# Patient Record
Sex: MALE | Race: BLACK OR AFRICAN AMERICAN | Employment: UNEMPLOYED | URBAN - METROPOLITAN AREA
[De-identification: names, ages, dates, MRNs, and addresses within clinical notes are randomized per-mention and may not be internally consistent; named-entity substitution may affect disease eponyms.]

---

## 2017-03-20 RX ORDER — LORATADINE 10 MG/1
TABLET ORAL
Qty: 31 TAB | Status: SHIPPED | OUTPATIENT
Start: 2017-03-20 | End: 2017-11-16 | Stop reason: ALTCHOICE

## 2017-04-25 RX ORDER — POLYETHYLENE GLYCOL 3350 17 G/17G
POWDER, FOR SOLUTION ORAL
Qty: 527 G | Refills: 0 | Status: SHIPPED | OUTPATIENT
Start: 2017-04-25 | End: 2017-05-31 | Stop reason: SDUPTHER

## 2017-05-31 ENCOUNTER — OFFICE VISIT (OUTPATIENT)
Dept: FAMILY MEDICINE CLINIC | Age: 42
End: 2017-05-31

## 2017-05-31 VITALS
HEART RATE: 82 BPM | RESPIRATION RATE: 16 BRPM | HEIGHT: 64 IN | WEIGHT: 123.6 LBS | DIASTOLIC BLOOD PRESSURE: 84 MMHG | TEMPERATURE: 96.5 F | SYSTOLIC BLOOD PRESSURE: 124 MMHG | BODY MASS INDEX: 21.1 KG/M2

## 2017-05-31 DIAGNOSIS — J30.2 SEASONAL ALLERGIC RHINITIS, UNSPECIFIED ALLERGIC RHINITIS TRIGGER: Primary | ICD-10-CM

## 2017-05-31 RX ORDER — FLUTICASONE PROPIONATE 50 MCG
SPRAY, SUSPENSION (ML) NASAL
Qty: 1 BOTTLE | Refills: 12 | Status: SHIPPED | OUTPATIENT
Start: 2017-05-31 | End: 2017-11-16 | Stop reason: ALTCHOICE

## 2017-05-31 RX ORDER — CETIRIZINE HCL 10 MG
10 TABLET ORAL DAILY
Qty: 30 TAB | Refills: 12 | Status: SHIPPED | OUTPATIENT
Start: 2017-05-31 | End: 2017-11-16 | Stop reason: SDUPTHER

## 2017-05-31 RX ORDER — POLYETHYLENE GLYCOL 3350 17 G/17G
POWDER, FOR SOLUTION ORAL
Qty: 527 G | Refills: 0 | Status: SHIPPED | OUTPATIENT
Start: 2017-05-31 | End: 2017-06-26 | Stop reason: SDUPTHER

## 2017-05-31 NOTE — PROGRESS NOTES
HISTORY OF PRESENT ILLNESS  Sharon Fan is a 38 y.o. male. HPI Brought in by Magdiel and liliana. Has been having lots of problems with runny nose, congestion for the last 2 weeks. NO cough, fever, chills. Not taking any meds for this condition. Often has problems with runny nose. Has been on Flonase in the past- minimal relief. ROS    Physical Exam   Constitutional: He appears well-developed and well-nourished. HENT:   Right Ear: External ear normal.   Left Ear: External ear normal.   Mouth/Throat: Oropharynx is clear and moist.   Clear rhinorrhea   Neck: No thyromegaly present. Cardiovascular: Normal rate, regular rhythm, normal heart sounds and intact distal pulses. Pulmonary/Chest: Effort normal and breath sounds normal. No respiratory distress. He has no wheezes. Abdominal: Soft. Bowel sounds are normal. He exhibits no distension and no mass. There is no tenderness. There is no guarding. Musculoskeletal: Normal range of motion. He exhibits no edema. Lymphadenopathy:     He has no cervical adenopathy. Nursing note and vitals reviewed. ASSESSMENT and PLAN  Orders Placed This Encounter    cetirizine (ZYRTEC) 10 mg tablet    fluticasone (FLONASE) 50 mcg/actuation nasal spray     Ashleigh Elizondo was seen today for nasal congestion. Diagnoses and all orders for this visit:    Seasonal allergic rhinitis, unspecified allergic rhinitis trigger    Other orders  -     Cancel: CBC WITH AUTOMATED DIFF  -     Cancel: METABOLIC PANEL, COMPREHENSIVE  -     Cancel: LIPID PANEL  -     Cancel: AMB POC HEMOGLOBIN A1C  -     cetirizine (ZYRTEC) 10 mg tablet; Take 1 Tab by mouth daily.  For allergies  -     fluticasone (FLONASE) 50 mcg/actuation nasal spray; 2 sprays each nostril daily      Follow-up Disposition: Not on File

## 2017-05-31 NOTE — MR AVS SNAPSHOT
Visit Information Date & Time Provider Department Dept. Phone Encounter #  
 5/31/2017  2:30 PM Maria Isabel Navas MD [facility] [phone] [id number] Upcoming Health Maintenance Date Due  
 MEDICARE YEARLY EXAM 10/3/2015 EYE EXAM RETINAL OR DILATED Q1 7/9/2016 LIPID PANEL Q1 11/10/2016 MICROALBUMIN Q1 11/12/2016 FOOT EXAM Q1 3/23/2017 HEMOGLOBIN A1C Q6M 6/1/2017 INFLUENZA AGE 9 TO ADULT 8/1/2017 DTaP/Tdap/Td series (2 - Td) 8/12/2023 Allergies as of 5/31/2017  Review Complete On: 5/31/2017 By: Maria Isabel Navas MD  
  
 Severity Noted Reaction Type Reactions Zofran [Ondansetron Hcl] High 11/11/2011    Itching Arm turned red & itched Current Immunizations  Reviewed on 11/5/2015 Name Date Influenza Vaccine 10/22/2014 Influenza Vaccine (Quad) PF 12/1/2016, 11/5/2015 Influenza Vaccine PF 11/7/2013 Influenza Vaccine Split 9/26/2012, 12/14/2011, 10/26/2009 PPD 9/26/2012 Pneumococcal Polysaccharide (PPSV-23) 8/12/2013 TB Skin Test (PPD) Intradermal 11/10/2015, 10/2/2014, 9/16/2013, 8/12/2013 Tdap 8/12/2013 Not reviewed this visit Vitals BP Pulse Temp Resp Height(growth percentile) Weight(growth percentile) 124/84 82 96.5 °F (35.8 °C) (Oral) 16 5' 4\" (1.626 m) 123 lb 9.6 oz (56.1 kg) BMI Smoking Status 21.22 kg/m2 Never Smoker Vitals History BMI and BSA Data Body Mass Index Body Surface Area  
 21.22 kg/m 2 1.59 m 2 Preferred Pharmacy Pharmacy Name Phone [facility] [address] [phone] Your Updated Medication List  
  
   
This list is accurate as of: 5/31/17  3:37 PM.  Always use your most recent med list.  
  
  
  
  
 acetaminophen 100 mg/mL suspension Commonly known as:  TYLENOL Take 9.9 mL by mouth every four (4) hours as needed for Pain or Fever. albuterol 90 mcg/actuation inhaler Commonly known as:  PROVENTIL HFA, VENTOLIN HFA, PROAIR HFA Take 1 puff by inhalation every four (4) hours as needed for Wheezing. BYSTOLIC 10 mg tablet Generic drug:  nebivolol TAKE 1 TABLET BY MOUTH TWICE A DAY  
  
 cetirizine 10 mg tablet Commonly known as:  ZYRTEC Take 1 Tab by mouth daily. For allergies  
  
 chlorhexidine 0.12 % solution Commonly known as:  PERIDEX 15 mL by Swish and Spit route two (2) times a day. cloNIDine HCl 0.1 mg tablet Commonly known as:  CATAPRES  
TAKE ONE TABLET BY MOUTH AT BEDTIME  
  
 DOC-Q-LACE 100 mg capsule Generic drug:  docusate sodium TAKE (1) CAPSULE BY MOUTH TWICE DAILY * fluticasone 50 mcg/actuation nasal spray Commonly known as:  Maren Shoaib 2 Sprays by Both Nostrils route daily. * fluticasone 50 mcg/actuation nasal spray Commonly known as:  FLONASE  
2 sprays each nostril daily  
  
 guaiFENesin  mg SR tablet Commonly known as:  Pardeep & Pardeep Take 1 Tab by mouth two (2) times a day. guaiFENesin-codeine 100-10 mg/5 mL solution Commonly known as:  ROBITUSSIN AC Take 10 mL by mouth three (3) times daily as needed for Cough or Congestion. LASTACAFT 0.25 % Drop Generic drug:  alcaftadine Apply  to eye.  
  
 lisinopril-hydroCHLOROthiazide 20-25 mg per tablet Commonly known as:  PRINZIDE, ZESTORETIC  
TAKE 1 TABLET BY MOUTH DAILY  
  
 loratadine 10 mg tablet Commonly known as:  CLARITIN  
TAKE 1 TABLET BY MOUTH DAILY FOR ALLERGIES  
  
 OCUSOFT EYELID CLEANSING PADS Pads Generic drug:  Miscellaneous Medical Supply  
by Does Not Apply route. omeprazole 40 mg capsule Commonly known as:  PRILOSEC  
TAKE 1 CAPSULE BY MOUTH EACH MORNING 30 MINUTES BEFORE BREAKFAST polyethylene glycol 17 gram/dose powder Commonly known as:  Carolann Limb MIX 1 CAPFUL (17 GM.) IN GLASS OF WATER & DRINK TWICE DAILY FOR CONSTIPATION  
  
 potassium chloride SR 10 mEq tablet Commonly known as:  KLOR-CON 10  
 TAKE 1 TABLET BY MOUTH DAILY * Notice: This list has 2 medication(s) that are the same as other medications prescribed for you. Read the directions carefully, and ask your doctor or other care provider to review them with you. Prescriptions Sent to Pharmacy Refills  
 cetirizine (ZYRTEC) 10 mg tablet 12 Sig: Take 1 Tab by mouth daily. For allergies Class: Normal  
 Pharmacy: [address] [place] [address] [address] #: [phone] Route: Oral  
 fluticasone (FLONASE) 50 mcg/actuation nasal spray 12 Si sprays each nostril daily Class: Normal  
 Pharmacy: [address] [place], [place] [address] #: [phone] [facility]! Dear Daniel Hawkins: 
Thank you for requesting a TuneWiki account. Our records indicate that you have previously registered for a TuneWiki account but its currently inactive. Please call our TuneWiki support line at [phone]. Additional Information If you have questions, please visit the Frequently Asked Questions section of the TuneWiki website at [email or web address] Remember, TuneWiki is NOT to be used for urgent needs. For medical emergencies, dial 911. Now available from your iPhone and Android! Please provide this summary of care documentation to your next provider. Your primary care clinician is listed as Oscar Pettit. If you have any questions after today's visit, please call [phone].

## 2017-05-31 NOTE — PROGRESS NOTES
Chief Complaint   Patient presents with    Nasal Congestion     x 2 weeks  runny nose     1. Have you been to the ER, urgent care clinic since your last visit? Hospitalized since your last visit? No    2. Have you seen or consulted any other health care providers outside of the [address] [place] [address] since your last visit? Include any pap smears or colon screening.  No     Health Maintenance Due   Topic Date Due    MEDICARE YEARLY EXAM  10/03/2015    EYE EXAM RETINAL OR DILATED Q1  07/09/2016    LIPID PANEL Q1  11/10/2016    MICROALBUMIN Q1  11/12/2016    FOOT EXAM Q1  03/23/2017    HEMOGLOBIN A1C Q6M  06/01/2017

## 2017-06-20 RX ORDER — LISINOPRIL AND HYDROCHLOROTHIAZIDE 20; 25 MG/1; MG/1
TABLET ORAL
Qty: 30 TAB | Status: SHIPPED | OUTPATIENT
Start: 2017-06-20 | End: 2017-11-16 | Stop reason: SDUPTHER

## 2017-06-26 RX ORDER — POLYETHYLENE GLYCOL 3350 17 G/17G
POWDER, FOR SOLUTION ORAL
Qty: 527 G | Refills: 0 | Status: SHIPPED | OUTPATIENT
Start: 2017-06-26 | End: 2017-11-16 | Stop reason: SDUPTHER

## 2017-07-24 ENCOUNTER — OFFICE VISIT (OUTPATIENT)
Dept: FAMILY MEDICINE CLINIC | Age: 42
End: 2017-07-24

## 2017-07-24 VITALS
HEART RATE: 83 BPM | BODY MASS INDEX: 21.17 KG/M2 | DIASTOLIC BLOOD PRESSURE: 78 MMHG | HEIGHT: 64 IN | RESPIRATION RATE: 14 BRPM | TEMPERATURE: 95.1 F | WEIGHT: 124 LBS | SYSTOLIC BLOOD PRESSURE: 143 MMHG

## 2017-07-24 DIAGNOSIS — E87.6 HYPOKALEMIA: ICD-10-CM
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
## 2017-07-24 DIAGNOSIS — R79.9 ELEVATED BUN: ICD-10-CM
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
## 2017-07-24 DIAGNOSIS — I10 ESSENTIAL HYPERTENSION: Primary | ICD-10-CM
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
## 2017-07-24 RX ORDER — CICLOPIROX 80 MG/ML
SOLUTION TOPICAL
COMMUNITY
Start: 2017-05-31 | End: 2017-09-28 | Stop reason: SDUPTHER

## 2017-07-24 NOTE — MR AVS SNAPSHOT
Visit Information Date & Time Provider Department Dept. Phone Encounter #  
 7/24/2017 12:00 PM Jesse Stone MD [address] [facility] [phone] [id number] Upcoming Health Maintenance Date Due  
 MEDICARE YEARLY EXAM 10/3/2015 EYE EXAM RETINAL OR DILATED Q1 7/9/2016 LIPID PANEL Q1 11/10/2016 MICROALBUMIN Q1 11/12/2016 FOOT EXAM Q1 3/23/2017 HEMOGLOBIN A1C Q6M 6/1/2017 INFLUENZA AGE 9 TO ADULT 8/1/2017 DTaP/Tdap/Td series (2 - Td) 8/12/2023 Allergies as of 7/24/2017  Review Complete On: 7/24/2017 By: Karolyn Kee LPN Severity Noted Reaction Type Reactions Zofran [Ondansetron Hcl] High 11/11/2011    Itching Arm turned red & itched No Known Allergies Medium   Hives Current Immunizations  Reviewed on 11/5/2015 Name Date Influenza Vaccine 10/22/2014 Influenza Vaccine (Quad) PF 12/1/2016, 11/5/2015 Influenza Vaccine PF 11/7/2013 Influenza Vaccine Split 9/26/2012, 12/14/2011, 10/26/2009 PPD 9/26/2012 Pneumococcal Polysaccharide (PPSV-23) 8/12/2013 TB Skin Test (PPD) Intradermal 11/10/2015, 10/2/2014, 9/16/2013, 8/12/2013 Tdap 8/12/2013 Not reviewed this visit You Were Diagnosed With   
  
 Codes Comments Essential hypertension    -  Primary ICD-10-CM: I10 
ICD-9-CM: 401.9 Elevated BUN     ICD-10-CM: R79.9 ICD-9-CM: 790.6 Hypokalemia     ICD-10-CM: E87.6 ICD-9-CM: 276.8 Fall at nursing home, sequela     ICD-10-CM: W19. Valeen Severe, V40.072 ICD-9-CM: 909.4, E929.3 Vitals BP Pulse Temp Resp Height(growth percentile) Weight(growth percentile) 143/78 (BP 1 Location: Left arm, BP Patient Position: At rest) 83 95.1 °F (35.1 °C) 14 5' 4\" (1.626 m) 124 lb (56.2 kg) BMI Smoking Status 21.28 kg/m2 Never Smoker Vitals History BMI and BSA Data Body Mass Index Body Surface Area  
 21.28 kg/m 2 1.59 m 2 Preferred Pharmacy Pharmacy Name Phone [facility], [address] [phone] Your Updated Medication List  
  
   
This list is accurate as of: 7/24/17 12:58 PM.  Always use your most recent med list.  
  
  
  
  
 acetaminophen 100 mg/mL suspension Commonly known as:  TYLENOL Take 9.9 mL by mouth every four (4) hours as needed for Pain or Fever. albuterol 90 mcg/actuation inhaler Commonly known as:  PROVENTIL HFA, VENTOLIN HFA, PROAIR HFA Take 1 puff by inhalation every four (4) hours as needed for Wheezing. BYSTOLIC 10 mg tablet Generic drug:  nebivolol TAKE 1 TABLET BY MOUTH TWICE A DAY  
  
 cetirizine 10 mg tablet Commonly known as:  ZYRTEC Take 1 Tab by mouth daily. For allergies  
  
 chlorhexidine 0.12 % solution Commonly known as:  PERIDEX 15 mL by Swish and Spit route two (2) times a day. ciclopirox 8 % solution Commonly known as:  PENLAC  
  
 cloNIDine HCl 0.1 mg tablet Commonly known as:  CATAPRES  
TAKE ONE TABLET BY MOUTH AT BEDTIME  
  
 DOC-Q-LACE 100 mg capsule Generic drug:  docusate sodium TAKE (1) CAPSULE BY MOUTH TWICE DAILY * fluticasone 50 mcg/actuation nasal spray Commonly known as:  Barry Public 2 Sprays by Both Nostrils route daily. * fluticasone 50 mcg/actuation nasal spray Commonly known as:  FLONASE  
2 sprays each nostril daily  
  
 guaiFENesin  mg SR tablet Commonly known as:  Pardeep & Pardeep Take 1 Tab by mouth two (2) times a day. guaiFENesin-codeine 100-10 mg/5 mL solution Commonly known as:  ROBITUSSIN AC Take 10 mL by mouth three (3) times daily as needed for Cough or Congestion. LASTACAFT 0.25 % Drop Generic drug:  alcaftadine Apply  to eye.  
  
 lisinopril-hydroCHLOROthiazide 20-25 mg per tablet Commonly known as:  PRINZIDE, ZESTORETIC  
TAKE 1 TABLET BY MOUTH DAILY  
  
 loratadine 10 mg tablet Commonly known as:  Areli Pressley  
 TAKE 1 TABLET BY MOUTH DAILY FOR ALLERGIES  
  
 OCUSOFT EYELID CLEANSING PADS Pads Generic drug:  Miscellaneous Medical Supply  
by Does Not Apply route. omeprazole 40 mg capsule Commonly known as:  PRILOSEC  
TAKE 1 CAPSULE BY MOUTH EACH MORNING 30 MINUTES BEFORE BREAKFAST polyethylene glycol 17 gram/dose powder Commonly known as:  Ferol Erik MIX 1 CAPFUL (17 GM.) IN GLASS OF WATER & DRINK TWICE DAILY FOR CONSTIPATION  
  
 potassium chloride SR 10 mEq tablet Commonly known as:  KLOR-CON 10  
TAKE 1 TABLET BY MOUTH DAILY * Notice: This list has 2 medication(s) that are the same as other medications prescribed for you. Read the directions carefully, and ask your doctor or other care provider to review them with you. We Performed the Following CBC W/O DIFF [53869 CPT(R)] HEMOGLOBIN A1C WITH EAG [43067 CPT(R)] METABOLIC PANEL, COMPREHENSIVE [44680 CPT(R)] MICROALBUMIN, UR, RAND W/ MICROALBUMIN/CREA RATIO [id number] CPT(R)] REFERRAL TO OPTOMETRY [id number] Custom] Comments:  
 Please evaluate patient for dm patient caregiver will schedule appointment. REFERRAL TO OPTOMETRY [id number] Custom] Comments:  
 Please evaluate patient for . Referral Information Referral ID Referred By Referred To  
  
 [id number] RANDY WRIGHT MD   
   [address] [facility] [address] [place] [address] Phone: [phone] Fax: [phone] Visits Status Start Date End Date 1 New Request 7/24/17 7/24/18 If your referral has a status of pending review or denied, additional information will be sent to support the outcome of this decision. Referral ID Referred By Referred To  
 [id number] RANDY WRIGHT MD  
   [address], [address] [place] [address] Phone: [phone] Fax: [phone] Visits Status Start Date End Date 1 New Request 7/24/17 7/24/18 If your referral has a status of pending review or denied, additional information will be sent to support the outcome of this decision. Introducing [facility] SERVICES! Dear Joey Yee: 
Thank you for requesting a KeyEffx account. Our records indicate that you have previously registered for a KeyEffx account but its currently inactive. Please call our KeyEffx support line at [phone]. Additional Information If you have questions, please visit the Frequently Asked Questions section of the KeyEffx website at [email or web address] Remember, KeyEffx is NOT to be used for urgent needs. For medical emergencies, dial 911. Now available from your iPhone and Android! Please provide this summary of care documentation to your next provider. Your primary care clinician is listed as Rebecca Mason. If you have any questions after today's visit, please call [phone].

## 2017-07-24 NOTE — PROGRESS NOTES
Angie Pulliam        Name and  verified via caregiver. Chief Complaint   Patient presents with    Documentation     last Well Male 2016         Health Maintenance reviewed-discussed with patient.

## 2017-07-24 NOTE — PROGRESS NOTES
HISTORY OF PRESENT ILLNESS  Melania Tay is a 38 y.o. male. HPI   S/p fall  No recent fall > 6months, no recent injury with difficulty walking, but pt is active, eating well, not agitated, no hallucinating,    Not constipated with the current Rx  On Gait belt for bulmaro unable to use any mechanical device for mobillity  Hx of HTN compliant with his meds, having the low salt diet, no leg swelling        Current Outpatient Prescriptions   Medication Sig Dispense Refill    ciclopirox (PENLAC) 8 % solution       polyethylene glycol (MIRALAX) 17 gram/dose powder MIX 1 CAPFUL (17 GM.) IN GLASS OF WATER & DRINK TWICE DAILY FOR CONSTIPATION 527 g 0    DOC-Q-LACE 100 mg capsule TAKE (1) CAPSULE BY MOUTH TWICE DAILY 60 Cap PRN    lisinopril-hydroCHLOROthiazide (PRINZIDE, ZESTORETIC) 20-25 mg per tablet TAKE 1 TABLET BY MOUTH DAILY 30 Tab PRN    cetirizine (ZYRTEC) 10 mg tablet Take 1 Tab by mouth daily. For allergies 30 Tab 12    fluticasone (FLONASE) 50 mcg/actuation nasal spray 2 sprays each nostril daily 1 Bottle 12    potassium chloride SR (KLOR-CON 10) 10 mEq tablet TAKE 1 TABLET BY MOUTH DAILY 31 Tab PRN    BYSTOLIC 10 mg tablet TAKE 1 TABLET BY MOUTH TWICE A DAY 62 Tab PRN    cloNIDine HCl (CATAPRES) 0.1 mg tablet TAKE ONE TABLET BY MOUTH AT BEDTIME 31 Tab PRN    Miscellaneous Medical Supply (OCUSOFT EYELID CLEANSING PADS) pads by Does Not Apply route.  omeprazole (PRILOSEC) 40 mg capsule TAKE 1 CAPSULE BY MOUTH EACH MORNING 30 MINUTES BEFORE BREAKFAST 31 Cap PRN    loratadine (CLARITIN) 10 mg tablet TAKE 1 TABLET BY MOUTH DAILY FOR ALLERGIES 31 Tab PRN    fluticasone (FLONASE) 50 mcg/actuation nasal spray 2 Sprays by Both Nostrils route daily. 1 Bottle 6    chlorhexidine (PERIDEX) 0.12 % solution 15 mL by Swish and Spit route two (2) times a day.  alcaftadine (LASTACAFT) 0.25 % drop Apply  to eye.       guaiFENesin-codeine (ROBITUSSIN AC)  mg/5 mL solution Take 10 mL by mouth three (3) times daily as needed for Cough or Congestion. 120 mL 0    albuterol (PROVENTIL HFA, VENTOLIN HFA, PROAIR HFA) 90 mcg/actuation inhaler Take 1 puff by inhalation every four (4) hours as needed for Wheezing. 1 Inhaler 1    acetaminophen (TYLENOL) 100 mg/mL suspension Take 9.9 mL by mouth every four (4) hours as needed for Pain or Fever. 1 Bottle 0    guaiFENesin SR (MUCINEX) 600 mg SR tablet Take 1 Tab by mouth two (2) times a day. 15 Tab 0     Allergies   Allergen Reactions    Zofran [Ondansetron Hcl] Itching     Arm turned red & itched    No Known Allergies Hives     Past Medical History:   Diagnosis Date    Acute pharyngitis 9/29/2014    Bronchitis, acute 12/12/2013    Cellulitis of groin 3/23/2016    Contact dermatitis and other eczema, due to unspecified cause     Decrease in appetite 12/12/2013    Diabetes ([address]     Dysphagia 10/22/2014    Fever in adult 9/29/2014    Hypertension     Mental retardation     Prediabetes 10/2/2014    Screening for glaucoma 12/3/2014     Past Surgical History:   Procedure Laterality Date    HX HEENT      dental surg. June 7, 2010     Family History   Problem Relation Age of Onset    Family history unknown: Yes     Social History   Substance Use Topics    Smoking status: Never Smoker    Smokeless tobacco: Never Used    Alcohol use No      Lab Results  Component Value Date/Time   WBC 3.9 12/01/2016 10:27 AM   HGB 12.7 12/01/2016 10:27 AM   HCT 37.1 12/01/2016 10:27 AM   PLATELET 099 91/29/1090 10:27 AM   MCV 90 12/01/2016 10:27 AM     Lab Results  Component Value Date/Time   GFR est non-AA 83 12/01/2016 10:27 AM   GFR est AA 96 12/01/2016 10:27 AM   Creatinine 1.09 12/01/2016 10:27 AM   BUN 20 12/01/2016 10:27 AM   Sodium 141 12/01/2016 10:27 AM   Potassium 4.2 12/01/2016 10:27 AM   Chloride 97 12/01/2016 10:27 AM   CO2 29 12/01/2016 10:27 AM        Review of Systems   Constitutional: Negative for chills and fever. HENT: Negative for ear pain and nosebleeds. Eyes: Negative for blurred vision, pain and discharge. Respiratory: Negative for shortness of breath. Cardiovascular: Negative for chest pain and leg swelling. Gastrointestinal: Negative for constipation, diarrhea, nausea and vomiting. Genitourinary: Negative for frequency. Musculoskeletal: Negative for joint pain. Skin: Negative for itching and rash. Neurological: Negative for headaches. Psychiatric/Behavioral: Negative for depression. The patient is not nervous/anxious. Physical Exam   Constitutional: He is oriented to person, place, and time. He appears well-developed and well-nourished. HENT:   Head: Normocephalic and atraumatic. Mouth/Throat: No oropharyngeal exudate. Eyes: Conjunctivae and EOM are normal.   Neck: Normal range of motion. Neck supple. Cardiovascular: Normal rate, regular rhythm and normal heart sounds. No murmur heard. Pulmonary/Chest: Effort normal and breath sounds normal. No respiratory distress. Abdominal: Soft. Bowel sounds are normal. He exhibits no distension. There is no rebound. Musculoskeletal: He exhibits no edema or tenderness. Neurological: He is alert and oriented to person, place, and time. Skin: Skin is warm. No erythema. Psychiatric: He has a normal mood and affect. His behavior is normal.   Nursing note and vitals reviewed. ASSESSMENT and PLAN  Brayden Santoyo was seen today for documentation.     Diagnoses and all orders for this visit:    Essential hypertension  -     Cancel: REFERRAL TO OPTOMETRY  -     Cancel: MICROALBUMIN, UR, RAND W/ MICROALBUMIN/CREA RATIO  -     REFERRAL TO OPTOMETRY  -     HEMOGLOBIN A1C WITH EAG  -     CBC W/O DIFF  -     METABOLIC PANEL, COMPREHENSIVE  -     REFERRAL TO OPTOMETRY    Elevated BUN  -     Cancel: REFERRAL TO OPTOMETRY  -     Cancel: MICROALBUMIN, UR, RAND W/ MICROALBUMIN/CREA RATIO  -     REFERRAL TO OPTOMETRY  -     HEMOGLOBIN A1C WITH EAG  -     CBC W/O DIFF  -     METABOLIC PANEL, COMPREHENSIVE  -     REFERRAL TO OPTOMETRY  -     MICROALBUMIN, UR, RAND W/ MICROALBUMIN/CREA RATIO; Future    Hypokalemia  -     Cancel: REFERRAL TO OPTOMETRY  -     Cancel: MICROALBUMIN, UR, RAND W/ MICROALBUMIN/CREA RATIO  -     REFERRAL TO OPTOMETRY  -     HEMOGLOBIN A1C WITH EAG  -     CBC W/O DIFF  -     METABOLIC PANEL, COMPREHENSIVE    Fall at nursing home, sequela  -     Cancel: REFERRAL TO OPTOMETRY  -     Cancel: MICROALBUMIN, UR, RAND W/ MICROALBUMIN/CREA RATIO  -     REFERRAL TO OPTOMETRY  -     HEMOGLOBIN A1C WITH EAG  -     CBC W/O DIFF  -     METABOLIC PANEL, COMPREHENSIVE    Other orders  -     Cancel: AMB POC FUNDUS PHOTOGRAPHY WITH INTERP AND REPORT  -     Cancel: AMB POC HEMOGLOBIN A1C  -     Cancel: AMB POC FUNDUS PHOTOGRAPHY WITH INTERP AND REPORT

## 2017-07-25 LAB
ALBUMIN SERPL-MCNC: 3.9 G/DL (ref 3.5–5.5)
ALBUMIN/GLOB SERPL: 1.3 {RATIO} (ref 1.2–2.2)
ALP SERPL-CCNC: 73 IU/L (ref 39–117)
ALT SERPL-CCNC: 13 IU/L (ref 0–44)
AST SERPL-CCNC: 11 IU/L (ref 0–40)
BILIRUB SERPL-MCNC: <0.2 MG/DL (ref 0–1.2)
BUN SERPL-MCNC: 19 MG/DL (ref 6–24)
BUN/CREAT SERPL: 23 (ref 9–20)
CALCIUM SERPL-MCNC: 9.1 MG/DL (ref 8.7–10.2)
CHLORIDE SERPL-SCNC: 97 MMOL/L (ref 96–106)
CO2 SERPL-SCNC: 27 MMOL/L (ref 18–29)
CREAT SERPL-MCNC: 0.83 MG/DL (ref 0.76–1.27)
ERYTHROCYTE [DISTWIDTH] IN BLOOD BY AUTOMATED COUNT: 14.1 % (ref 12.3–15.4)
EST. AVERAGE GLUCOSE BLD GHB EST-MCNC: 126 MG/DL
GLOBULIN SER CALC-MCNC: 3 G/DL (ref 1.5–4.5)
GLUCOSE SERPL-MCNC: 93 MG/DL (ref 65–99)
HBA1C MFR BLD: 6 % (ref 4.8–5.6)
HCT VFR BLD AUTO: 35.3 % (ref 37.5–51)
HGB BLD-MCNC: 12.2 G/DL (ref 12.6–17.7)
MCH RBC QN AUTO: 30.8 PG (ref 26.6–33)
MCHC RBC AUTO-ENTMCNC: 34.6 G/DL (ref 31.5–35.7)
MCV RBC AUTO: 89 FL (ref 79–97)
PLATELET # BLD AUTO: 166 X10E3/UL (ref 150–379)
POTASSIUM SERPL-SCNC: 3.7 MMOL/L (ref 3.5–5.2)
PROT SERPL-MCNC: 6.9 G/DL (ref 6–8.5)
RBC # BLD AUTO: 3.96 X10E6/UL (ref 4.14–5.8)
SODIUM SERPL-SCNC: 143 MMOL/L (ref 134–144)
WBC # BLD AUTO: 4.6 X10E3/UL (ref 3.4–10.8)

## 2017-07-26 ENCOUNTER — LAB ONLY (OUTPATIENT)
Dept: FAMILY MEDICINE CLINIC | Age: 42
End: 2017-07-26

## 2017-07-26 DIAGNOSIS — R79.9 ELEVATED BUN: ICD-10-CM

## 2017-07-27 LAB
ALBUMIN/CREAT UR: 7.6 MG/G CREAT (ref 0–30)
CREAT UR-MCNC: 100.6 MG/DL
MICROALBUMIN UR-MCNC: 7.6 UG/ML

## 2017-08-23 ENCOUNTER — TELEPHONE (OUTPATIENT)
Dept: FAMILY MEDICINE CLINIC | Age: 42
End: 2017-08-23

## 2017-08-23 NOTE — TELEPHONE ENCOUNTER
----- Message from Rosalinda Taylor sent at 8/23/2017  3:34 PM EDT -----  Regarding: Dr. Yuliet Carpenter: [phone]  Pt's caregiver at [facility] requested documents for the pt and never received the documents. The best contact number is [phone].

## 2017-08-24 NOTE — TELEPHONE ENCOUNTER
Returned call to pts group home,  [facility],  Spoke with Josefina. Requesting a copy of recent lab results. Also requesting prescription for diabetic diet due to pre diabetic diagnosis.    Prescriptions faxed to [phone]

## 2017-09-17 RX ORDER — OMEPRAZOLE 40 MG/1
CAPSULE, DELAYED RELEASE ORAL
Qty: 30 CAP | Status: SHIPPED | OUTPATIENT
Start: 2017-09-17 | End: 2017-11-16 | Stop reason: ALTCHOICE

## 2017-09-28 DIAGNOSIS — I10 ESSENTIAL HYPERTENSION: ICD-10-CM
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
## 2017-09-28 DIAGNOSIS — R79.9 ELEVATED BUN: ICD-10-CM

## 2017-09-28 DIAGNOSIS — E87.6 HYPOKALEMIA: ICD-10-CM

## 2017-09-30 RX ORDER — CICLOPIROX 80 MG/ML
SOLUTION TOPICAL
Qty: 6.6 ML | Refills: 0 | Status: SHIPPED | OUTPATIENT
Start: 2017-09-30 | End: 2017-12-07 | Stop reason: SDUPTHER

## 2017-10-26 ENCOUNTER — OFFICE VISIT (OUTPATIENT)
Dept: NEUROLOGY | Age: 42
End: 2017-10-26

## 2017-10-26 VITALS
HEART RATE: 76 BPM | BODY MASS INDEX: 21.34 KG/M2 | RESPIRATION RATE: 16 BRPM | HEIGHT: 64 IN | DIASTOLIC BLOOD PRESSURE: 76 MMHG | SYSTOLIC BLOOD PRESSURE: 116 MMHG | WEIGHT: 125 LBS

## 2017-10-26 DIAGNOSIS — R56.9 CONVULSIONS, UNSPECIFIED CONVULSION TYPE (HCC): Primary | ICD-10-CM

## 2017-10-26 NOTE — PROGRESS NOTES
Date:  2017    Name:  Edison Sommer  :  1975  MRN:  [id number]       REQUESTING PHYSICIAN:  Frankie Dee MD    CONSULTING PHYSICIAN:   Dr. Esme Jain    Chief Complaint   Patient presents with    Seizure     new patient     HISTORY OF PRESENT ILLNESS: Vivian Thompson is a 38 y.o., right handed, AA, male with a history of HTN, chronic constipation, diet controlled diabetes, seasonal allergies, autism, and MR, who presents today for initial consultation of possible seizure at the request of Frankie Dee MD. He is accompanied by a caregiver from his group home who helps to provide history. Four days ago, he had an event of possible seizure. He had just finished lunch and was getting up. He started to shaking and a caregiver tried to help him stay upright but then slumped to the floor. Once there, he was shaking and he was not responsive. He did lose control of his bowel or bladder. The event was about three minutes. He was transported to [facility] ER. The caregiver with him met him at the ER and he was back to his original baseline. This was about 30 minutes after the event. While she has been told he had seizures, no one with the group home has ever witnessed a seizure nor has been on any seizure medication. He has been with the group home since . His [facility] for seizure was from  [facility]. Review of Systems - History obtained from guardian and unobtainable from patient due to mental status    Current Outpatient Prescriptions   Medication Sig    ciclopirox (PENLAC) 8 % solution APPLY TO AFFECTED AREA TWICE DAILY EXTERNALLY AS DIRECTED.     omeprazole (PRILOSEC) 40 mg capsule TAKE 1 CAPSULE BY MOUTH EACH MORNING 30 MINUTES BEFORE BREAKFAST    polyethylene glycol (MIRALAX) 17 gram/dose powder MIX 1 CAPFUL (17 GM.) IN GLASS OF WATER & DRINK TWICE DAILY FOR CONSTIPATION    DOC-Q-LACE 100 mg capsule TAKE (1) CAPSULE BY MOUTH TWICE DAILY    lisinopril-hydroCHLOROthiazide (PRINZIDE, ZESTORETIC) 20-25 mg per tablet TAKE 1 TABLET BY MOUTH DAILY    cetirizine (ZYRTEC) 10 mg tablet Take 1 Tab by mouth daily. For allergies    fluticasone (FLONASE) 50 mcg/actuation nasal spray 2 sprays each nostril daily    loratadine (CLARITIN) 10 mg tablet TAKE 1 TABLET BY MOUTH DAILY FOR ALLERGIES    potassium chloride SR (KLOR-CON 10) 10 mEq tablet TAKE 1 TABLET BY MOUTH DAILY    BYSTOLIC 10 mg tablet TAKE 1 TABLET BY MOUTH TWICE A DAY    cloNIDine HCl (CATAPRES) 0.1 mg tablet TAKE ONE TABLET BY MOUTH AT BEDTIME    M[employer]ellaneous [employer] (OCUSOFT EYELID CLEANSING PADS) pads by Does Not Apply route.  fluticasone (FLONASE) 50 mcg/actuation nasal spray 2 Sprays by Both Nostrils route daily.  chlorhexidine (PERIDEX) 0.12 % solution 15 mL by Swish and Spit route two (2) times a day.  alcaftadine (LASTACAFT) 0.25 % drop Apply  to eye.  guaiFENesin-codeine (ROBITUSSIN AC)  mg/5 mL solution Take 10 mL by mouth three (3) times daily as needed for Cough or Congestion.  albuterol (PROVENTIL HFA, VENTOLIN HFA, PROAIR HFA) 90 mcg/actuation inhaler Take 1 puff by inhalation every four (4) hours as needed for Wheezing.  acetaminophen (TYLENOL) 100 mg/mL suspension Take 9.9 mL by mouth every four (4) hours as needed for Pain or Fever.  guaiFENesin SR (MUCINEX) 600 mg SR tablet Take 1 Tab by mouth two (2) times a day. No current facility-administered medications for this visit.       Allergies   Allergen Reactions    Zofran [Ondansetron Hcl] Itching     Arm turned red & itched    No Known Allergies Hives     Past Medical History:   Diagnosis Date    Acute pharyngitis 9/29/2014    Bronchitis, acute 12/12/2013    Cellulitis of groin 3/23/2016    Contact dermatitis and other eczema, due to unspecified cause     Decrease in appetite 12/12/2013    Diabetes ([facility] [address]     Dysphagia 10/22/2014    Fall at nursing home 7/24/2017    Fever in adult 9/29/2014    Hypertension     Mental retardation     Prediabetes 10/2/2014    Screening for glaucoma 12/3/2014     Past Surgical History:   Procedure Laterality Date    HX HEENT      dental surg. June 7, 2010     Social History     Social History    Marital status: SINGLE     Spouse name: N/A    Number of children: N/A    Years of education: N/A     Occupational History    Not on file. Social History Main Topics    Smoking status: Never Smoker    Smokeless tobacco: Never Used    Alcohol use No    Drug use: Not on file    Sexual activity: No     Other Topics Concern    Not on file     Social History Narrative     Family History   Problem Relation Age of Onset    Family history unknown: Yes       PHYSICAL EXAMINATION:    Visit Vitals    /76    Pulse 76    Resp 16    Ht 5' 4\" (1.626 m)    Wt 56.7 kg (125 lb)    BMI 21.46 kg/m2     Non verbal. Unsteady gait requiring assistance with a gait belt. ASSESSMENT AND PLAN    ICD-10-CM ICD-9-CM    1. Convulsions, unspecified convulsion type ([facility] [address] R56.9 780.39 EEG AMB NEURO     Possible seizure versus convulsive syncope. Of note, he did have a history of seizures that he received in 1991 but has not been on any anticonvulsant medication since at least 2005 without incidence. Will evaluate with an EEG. As this is one episode in more than 12 years of obtainable history, we will take a watch and wait approach. Given his suppose a history of seizure if he has a second event certainly then AED would be appropriate. Follow-up in 6 months or sooner if needed    Jasvir Chin

## 2017-10-26 NOTE — MR AVS SNAPSHOT
Visit Information Date & Time Provider Department Dept. Phone Encounter #  
 10/26/2017  1:45 PM Marly Kaplan NP [facility] Neurology [facility] [phone] [id number] Your Appointments 11/1/2017 10:00 AM  
PROCEDURE with EEG SCHEDULE [address] ([address]) Appt Note: eeg sck [place] [address] [address] [phone]  
  
   
 [place] [address]  
  
    
 1/25/2018 11:30 AM  
CONSULT with Marly Kaplan NP [address] ([address]) Appt Note: follow up sz  
 [address] [address] [phone]  
  
   
 Luigi Yates  
  
    
  
 11/16/2017  3:15 PM  
TRANSITIONAL CARE MANAGEMENT with Radha Ponce MD  
[facility] OFFICE-ANNEX ([address]) Appt Note: hospital follow up--seizure [address] [facility] [address] [phone] [address] [facility] [address] Upcoming Health Maintenance Date Due  
 MEDICARE YEARLY EXAM 10/3/2015 EYE EXAM RETINAL OR DILATED Q1 7/9/2016 LIPID PANEL Q1 11/10/2016 FOOT EXAM Q1 3/23/2017 INFLUENZA AGE 9 TO ADULT 8/1/2017 HEMOGLOBIN A1C Q6M 1/24/2018 MICROALBUMIN Q1 7/25/2018 DTaP/Tdap/Td series (2 - Td) 8/12/2023 Allergies as of 10/26/2017  Review Complete On: 10/26/2017 By: Beny Daley LPN Severity Noted Reaction Type Reactions Zofran [Ondansetron Hcl] High 11/11/2011    Itching Arm turned red & itched No Known Allergies Medium   Hives Current Immunizations  Reviewed on 11/5/2015 Name Date Influenza Vaccine 10/22/2014 Influenza Vaccine (Quad) PF 12/1/2016, 11/5/2015 Influenza Vaccine PF 11/7/2013 Influenza Vaccine Split 9/26/2012, 12/14/2011, 10/26/2009 PPD 9/26/2012 Pneumococcal Polysaccharide (PPSV-23) 8/12/2013 TB Skin Test (PPD) Intradermal 11/10/2015, 10/2/2014, 9/16/2013, 8/12/2013 Tdap 8/12/2013 Not reviewed this visit You Were Diagnosed With   
  
 Codes Comments Convulsions, unspecified convulsion type ([facility]    -  Primary ICD-10-CM: R56.9 ICD-9-CM: 780.39 Vitals BP Pulse Resp Height(growth percentile) Weight(growth percentile) BMI  
 116/76 76 16 5' 4\" (1.626 m) 125 lb (56.7 kg) 21.46 kg/m2 Smoking Status Never Smoker BMI and BSA Data Body Mass Index Body Surface Area  
 21.46 kg/m 2 1.6 m 2 Preferred Pharmacy Pharmacy Name Phone [facility] [phone] Your Updated Medication List  
  
   
This list is accurate as of: 10/26/17  2:34 PM.  Always use your most recent med list.  
  
  
  
  
 acetaminophen 100 mg/mL suspension Commonly known as:  TYLENOL Take 9.9 mL by mouth every four (4) hours as needed for Pain or Fever. albuterol 90 mcg/actuation inhaler Commonly known as:  PROVENTIL HFA, VENTOLIN HFA, PROAIR HFA Take 1 puff by inhalation every four (4) hours as needed for Wheezing. BYSTOLIC 10 mg tablet Generic drug:  nebivolol TAKE 1 TABLET BY MOUTH TWICE A DAY  
  
 cetirizine 10 mg tablet Commonly known as:  ZYRTEC Take 1 Tab by mouth daily. For allergies  
  
 chlorhexidine 0.12 % solution Commonly known as:  PERIDEX 15 mL by Swish and Spit route two (2) times a day. ciclopirox 8 % solution Commonly known as:  PENLAC  
APPLY TO AFFECTED AREA TWICE DAILY EXTERNALLY AS DIRECTED.  
  
 cloNIDine HCl 0.1 mg tablet Commonly known as:  CATAPRES  
TAKE ONE TABLET BY MOUTH AT BEDTIME  
  
 DOC-Q-LACE 100 mg capsule Generic drug:  docusate sodium TAKE (1) CAPSULE BY MOUTH TWICE DAILY * fluticasone 50 mcg/actuation nasal spray Commonly known as:  [place] 2 Sprays by Both Nostrils route daily. * fluticasone 50 mcg/actuation nasal spray Commonly known as:  FLONASE  
2 sprays each nostril daily  
  
 guaiFENesin  mg SR tablet Commonly known as:  [address] Take 1 Tab by mouth two (2) times a day. guaiFENesin-codeine 100-10 mg/5 mL solution Commonly known as:  ROBITUSSIN AC Take 10 mL by mouth three (3) times daily as needed for Cough or Congestion. LASTACAFT 0.25 % Drop Generic drug:  alcaftadine Apply  to eye.  
  
 lisinopril-hydroCHLOROthiazide 20-25 mg per tablet Commonly known as:  PRINZIDE, ZESTORETIC  
TAKE 1 TABLET BY MOUTH DAILY  
  
 loratadine 10 mg tablet Commonly known as:  CLARITIN  
TAKE 1 TABLET BY MOUTH DAILY FOR ALLERGIES  
  
 OCUSOFT EYELID CLEANSING PADS Pads Generic drug:  [employer]  
by Does Not Apply route. omeprazole 40 mg capsule Commonly known as:  PRILOSEC  
TAKE 1 CAPSULE BY MOUTH EACH MORNING 30 MINUTES BEFORE BREAKFAST polyethylene glycol 17 gram/dose powder Commonly known as:  [employer] MIX 1 CAPFUL (17 GM.) IN GLASS OF WATER & DRINK TWICE DAILY FOR CONSTIPATION  
  
 potassium chloride SR 10 mEq tablet Commonly known as:  KLOR-CON 10  
TAKE 1 TABLET BY MOUTH DAILY * Notice: This list has 2 medication(s) that are the same as other medications prescribed for you. Read the directions carefully, and ask your doctor or other care provider to review them with you. To-Do List   
 10/26/2017 Neurology:  EEG AMB NEURO Patient Instructions Information Regarding Testing If you have physican order for a test or a medication denied by your insurance company, this does not mean the test or medication is not appropriate for you as that is a medical decision, not a decision to be made by an insurance company representative or by an [employer] physician who has not interviewed and examined you. This is a decision to be made between you and your physician. The denial of services is a contractual matter between you and your insurance company, not an issue between your physician and the insurance company. If your test or medication is denied, you can take the following steps to help resolve the issue: 1. File a complaint with the [facility] of Insurance regarding your insurance company's denial of services ordered for you. You can do this either by calling them directly or by completing an on-line complaint form on the "[employer]. This can be found at www.Grid Net 2. Also file a formal complaint with your insurance company and ask to have the name of the person denying the service so that you may explore a legal option should you be harmed by this denial of service. Again, the fact the insurance company will not pay for the service does not mean it is not medically necessary and I would encourage you to follow through with the plan that was made with your physician 3. File a written complaint with your employer so your employer and benefit manager is aware of the poor coverage they are providing their employees. If you have medicare/medicaid, complain to your representative in the House and to your Keegan Nunez. PRESCRIPTION REFILL POLICY [facility] Statement to Patients April 1, 2014 In an effort to ensure the large volume of patient prescription refills is processed in the most efficient and expeditious manner, we are asking our patients to assist us by calling your Pharmacy for all prescription refills, this will include also your  Mail Order Pharmacy. The pharmacy will contact our office electronically to continue the refill process. Please do not wait until the last minute to call your pharmacy.  We need at least 48 hours (2days) to fill prescriptions. We also encourage you to call your pharmacy before going to  your prescription to make sure it is ready. With regard to controlled substance prescription refill requests (narcotic refills) that need to be picked up at our office, we ask your cooperation by providing us with at least 72 hours (3days) notice that you will need a refill. We will not refill narcotic prescription refill requests after 4:00pm on any weekday, Monday through Thursday, or after 2:00pm on Fridays, or on the weekends. We encourage everyone to explore another way of getting your prescription refill request processed using [employer]Fish, our patient web portal through our electronic medical record system. CleanFish is an efficient and effective way to communicate your medication request directly to the office and  downloadable as an johann on your smart phone . [employer]Fish also features a review functionality that allows you to view your medication list as well as leave messages for your physician. Are you ready to get connected? If so please review the attatched instructions or speak to any of our staff to get you set up right away! Thank you so much for your cooperation. Should you have any questions please contact our Practice Administrator. The Physicians and Staff,  [facility] If we have ordered testing for you, we do not call patients with results and we do not give test results over the phone. We schedule follow up appointments so that your results can be discussed in person and any questions you have regarding them may be addressed. If something of concern is revealed on your test, we will call you for a sooner follow up appointment.   Additionally, results may be found by using the My Chart feature and one of our patient service representatives at the  can give you instructions on how to access this feature of our electronic medical record system. [address] What is a living will? A living will is a legal form you use to write down the kind of care you want at the end of your life. It is used by the health professionals who will treat you if you aren't able to decide for yourself. If you put your wishes in writing, your loved ones and others will know what kind of care you want. They won't need to guess. This can ease your mind and be helpful to others. A living will is not the same as an estate or property will. An estate will explains what you want to happen with your money and property after you die. Is a living will a legal document? A living will is a legal document. Each state has its own laws about living goetz. If you move to another state, make sure that your living will is legal in the state where you now live. Or you might use a universal form that has been approved by many states. This kind of form can sometimes be completed and stored online. Your electronic copy will then be available wherever you have a connection to the Internet. In most cases, doctors will respect your wishes even if you have a form from a different state. · You don't need an  to complete a living will. But legal advice can be helpful if your state's laws are unclear, your health history is complicated, or your family can't agree on what should be in your living will. · You can change your living will at any time. Some people find that their wishes about end-of-life care change as their health changes. · In addition to making a living will, think about completing a medical power of  form. This form lets you name the person you want to make end-of-life treatment decisions for you (your \"health care agent\") if you're not able to. Many hospitals and nursing homes will give you the forms you need to complete a living will and a medical power of . · Your living will is used only if you can't make or communicate decisions for yourself anymore. If you become able to make decisions again, you can accept or refuse any treatment, no matter what you wrote in your living will. · Your state may offer an online registry. This is a place where you can store your living will online so the doctors and nurses who need to treat you can find it right away. What should you think about when creating a living will? Talk about your end-of-life wishes with your family members and your doctor. Let them know what you want. That way the people making decisions for you won't be surprised by your choices. Think about these questions as you make your living will: · Do you know enough about life support methods that might be used? If not, talk to your doctor so you know what might be done if you can't breathe on your own, your heart stops, or you're unable to swallow. · What things would you still want to be able to do after you receive life-support methods? Would you want to be able to walk? To speak? To eat on your own? To live without the help of machines? · If you have a choice, where do you want to be cared for? In your home? At a hospital or nursing home? · Do you want certain Islam practices performed if you become very ill? · If you have a choice at the end of your life, where would you prefer to die? At home? In a hospital or nursing home? Somewhere else? · Would you prefer to be buried or cremated? · Do you want your organs to be donated after you die? What should you do with your living will? · Make sure that your family members and your health care agent have copies of your living will. · Give your doctor a copy of your living will to keep in your medical record. If you have more than one doctor, make sure that each one has a copy. · You may want to put a copy of your living will where it can be easily found. Where can you learn more? Go to http://marie-angi.info/. Enter C555 in the search box to learn more about \"Learning About Living Joaquin Quezada. \" Current as of: September 24, 2016 Content Version: 11.4 © 9730-8208 [employer]. Care instructions adapted under license by [employer] (which disclaims liability or warranty for this information). If you have questions about a medical condition or this instruction, always ask your healthcare professional. [address] any warranty or liability for your use of this information. Introducing [facility]! Dear Altagracia Bueno: 
Thank you for requesting a IMT ([employer]) account. Our records indicate that you have previously registered for a [employer]MT ([employer]) account but its currently inactive. Please call our [employer]MT ([employer]) support line at [phone]. Additional Information If you have questions, please visit the Frequently Asked Questions section of the [employer]MT ([employer]) website at [email or web address] Remember, IMT ([employer]) is NOT to be used for urgent needs. For medical emergencies, dial 911. Now available from your iPhone and Android! Please provide this summary of care documentation to your next provider. Your primary care clinician is listed as Trish Washington. If you have any questions after today's visit, please call [phone].

## 2017-10-26 NOTE — PATIENT INSTRUCTIONS
Information Regarding Testing     If you have physican order for a test or a medication denied by your insurance company, this does not mean the test or medication is not appropriate for you as that is a medical decision, not a decision to be made by an insurance company representative or by an [facility] physician who has not interviewed and examined you. This is a decision to be made between you and your physician. The denial of services is a contractual matter between you and your insurance company, not an issue between your physician and the insurance company. If your test or medication is denied, you can take the following steps to help resolve the issue:    1. File a complaint with the [facility] regarding your insurance company's denial of services ordered for you. You can do this either by calling them directly or by completing an on-line complaint form on the [employer]. This can be found at www.[employer]    2. Also file a formal complaint with your insurance company and ask to have the name of the person denying the service so that you may explore a legal option should you be harmed by this denial of service. Again, the fact the insurance company will not pay for the service does not mean it is not medically necessary and I would encourage you to follow through with the plan that was made with your physician    3. File a written complaint with your employer so your employer and benefit manager is aware of the poor coverage they are providing their employees. If you have medicare/medicaid, complain to your representative in the House and to your Keegan Nunez.     [facility]   Statement to Patients  April 1, 2014      In an effort to ensure the large volume of patient prescription refills is processed in the most efficient and expeditious manner, we are asking our patients to assist us by calling your Pharmacy for all prescription refills, this will include also your  Mail Order Pharmacy. The pharmacy will contact our office electronically to continue the refill process. Please do not wait until the last minute to call your pharmacy. We need at least 48 hours (2days) to fill prescriptions. We also encourage you to call your pharmacy before going to  your prescription to make sure it is ready. With regard to controlled substance prescription refill requests (narcotic refills) that need to be picked up at our office, we ask your cooperation by providing us with at least 72 hours (3days) notice that you will need a refill. We will not refill narcotic prescription refill requests after 4:00pm on any weekday, Monday through Thursday, or after 2:00pm on Fridays, or on the weekends. We encourage everyone to explore another way of getting your prescription refill request processed using Move[employer], our patient web portal through our electronic medical record system. Movea is an efficient and effective way to communicate your medication request directly to the office and  downloadable as an johann on your smart phone . Movea also features a review functionality that allows you to view your medication list as well as leave messages for your physician. Are you ready to get connected? If so please review the attatched instructions or speak to any of our staff to get you set up right away! Thank you so much for your cooperation. Should you have any questions please contact our Practice Administrator. The Physicians and Staff,  [facility]       If we have ordered testing for you, we do not call patients with results and we do not give test results over the phone. We schedule follow up appointments so that your results can be discussed in person and any questions you have regarding them may be addressed.   If something of concern is revealed on your test, we will call you for a sooner follow up appointment. Additionally, results may be found by using the My Chart feature and one of our patient service representatives at the  can give you instructions on how to access this feature of our electronic medical record system. Learning About Living Sole  What is a living will? A living will is a legal form you use to write down the kind of care you want at the end of your life. It is used by the health professionals who will treat you if you aren't able to decide for yourself. If you put your wishes in writing, your loved ones and others will know what kind of care you want. They won't need to guess. This can ease your mind and be helpful to others. A living will is not the same as an estate or property will. An estate will explains what you want to happen with your money and property after you die. Is a living will a legal document? A living will is a legal document. Each state has its own laws about living goetz. If you move to another state, make sure that your living will is legal in the state where you now live. Or you might use a universal form that has been approved by many states. This kind of form can sometimes be completed and stored online. Your electronic copy will then be available wherever you have a connection to the Internet. In most cases, doctors will respect your wishes even if you have a form from a different state. · You don't need an  to complete a living will. But legal advice can be helpful if your state's laws are unclear, your health history is complicated, or your family can't agree on what should be in your living will. · You can change your living will at any time. Some people find that their wishes about end-of-life care change as their health changes. · In addition to making a living will, think about completing a medical power of  form.  This form lets you name the person you want to make end-of-life treatment decisions for you (your \"health care agent\") if you're not able to. Many hospitals and nursing homes will give you the forms you need to complete a living will and a medical power of . · Your living will is used only if you can't make or communicate decisions for yourself anymore. If you become able to make decisions again, you can accept or refuse any treatment, no matter what you wrote in your living will. · Your state may offer an online registry. This is a place where you can store your living will online so the doctors and nurses who need to treat you can find it right away. What should you think about when creating a living will? Talk about your end-of-life wishes with your family members and your doctor. Let them know what you want. That way the people making decisions for you won't be surprised by your choices. Think about these questions as you make your living will:  · Do you know enough about life support methods that might be used? If not, talk to your doctor so you know what might be done if you can't breathe on your own, your heart stops, or you're unable to swallow. · What things would you still want to be able to do after you receive life-support methods? Would you want to be able to walk? To speak? To eat on your own? To live without the help of machines? · If you have a choice, where do you want to be cared for? In your home? At a hospital or nursing home? · Do you want certain [employer] practices performed if you become very ill? · If you have a choice at the end of your life, where would you prefer to die? At home? In a hospital or nursing home? Somewhere else? · Would you prefer to be buried or cremated? · Do you want your organs to be donated after you die? What should you do with your living will? · Make sure that your family members and your health care agent have copies of your living will. · Give your doctor a copy of your living will to keep in your medical record.  If you have more than one doctor, make sure that each one has a copy. · You may want to put a copy of your living will where it can be easily found. Where can you learn more? Go to http://marie-angi.info/. Enter X710 in the search box to learn more about \"Learning About Living Perroy. \"  Current as of: September 24, 2016  Content Version: 11.4  © 0432-1412 [employer] (formerly [employer] Care instructions adapted under license by [employer] (which disclaims liability or warranty for this information). If you have questions about a medical condition or this instruction, always ask your healthcare professional. [address] any warranty or liability for your use of this information.

## 2017-10-26 NOTE — PROGRESS NOTES
New patient presenting with history of seizures. Care giver reports last seizure 10/21/17. Had not had a seizure in 5 years. Patient not on any seizure medication.

## 2017-11-01 ENCOUNTER — OFFICE VISIT (OUTPATIENT)
Dept: NEUROLOGY | Age: 42
End: 2017-11-01

## 2017-11-01 DIAGNOSIS — R56.9 CONVULSIONS, UNSPECIFIED CONVULSION TYPE (HCC): ICD-10-CM

## 2017-11-01 NOTE — PROCEDURES
EEG:      Date:  11/01/17    Requesting Physician:   Amy Callahan     An EEG is requested in this 38 y.o. man with static encephalopathy and seizure to evaluate for epileptiform abnormality. Medications are listed as including Prilosec, MiraLax, Zyrtec, Flonase, Claritin, Bystolic, and Proventil. This tracing is obtained during the awake state. Technically, the technologist notes there is some difficulty due to the patient's difficulty with corroborating. The tracing is degraded by patient movement and muscle artifact. Due to discernable portions, background consists of low voltage frequency beta wave activity. Hyperventilation is not performed due to his condition. Intermittent photic stimulation little alters the tracing. Sleep is not obtained. Interpretation:  EEG limited by the patient's ability to cooperate as noted above, failed to demonstrate any lateralized or epileptiform abnormalities.

## 2017-11-14 ENCOUNTER — TELEPHONE (OUTPATIENT)
Dept: NEUROLOGY | Age: 42
End: 2017-11-14

## 2017-11-14 NOTE — TELEPHONE ENCOUNTER
----- Message from Donovan Espitia sent at 11/14/2017 10:34 AM EST -----  Regarding: [place], [place]/Telephone  Jessica Grullon,  from the pt's group home, is wanting to know if Claudia Lyndagio still has the pt discharge paper's she submitted on 10-26-17, Mrs. Marino Ex[employer] needs those paper back, Mrs. Tahmina Castelan best contact number is [phone].

## 2017-11-15 RX ORDER — POTASSIUM CHLORIDE 750 MG/1
TABLET, FILM COATED, EXTENDED RELEASE ORAL
Qty: 30 TAB | Status: SHIPPED | OUTPATIENT
Start: 2017-11-15 | End: 2017-11-16 | Stop reason: SDUPTHER

## 2017-11-15 RX ORDER — CLONIDINE HYDROCHLORIDE 0.1 MG/1
TABLET ORAL
Qty: 30 TAB | Status: SHIPPED | OUTPATIENT
Start: 2017-11-15 | End: 2017-11-16 | Stop reason: SDUPTHER

## 2017-11-15 RX ORDER — NEBIVOLOL HYDROCHLORIDE 10 MG/1
TABLET ORAL
Qty: 60 TAB | Status: SHIPPED | OUTPATIENT
Start: 2017-11-15 | End: 2017-11-16 | Stop reason: SDUPTHER

## 2017-11-16 ENCOUNTER — OFFICE VISIT (OUTPATIENT)
Dept: FAMILY MEDICINE CLINIC | Age: 42
End: 2017-11-16

## 2017-11-16 VITALS
HEIGHT: 64 IN | HEART RATE: 72 BPM | SYSTOLIC BLOOD PRESSURE: 128 MMHG | WEIGHT: 125.2 LBS | RESPIRATION RATE: 14 BRPM | DIASTOLIC BLOOD PRESSURE: 75 MMHG | BODY MASS INDEX: 21.37 KG/M2

## 2017-11-16 DIAGNOSIS — D69.6 PLATELETS DECREASED (HCC): ICD-10-CM

## 2017-11-16 DIAGNOSIS — R56.9 SEIZURE (HCC): Primary | ICD-10-CM

## 2017-11-16 DIAGNOSIS — Z23 ENCOUNTER FOR IMMUNIZATION: ICD-10-CM

## 2017-11-16 RX ORDER — CLONIDINE HYDROCHLORIDE 0.1 MG/1
TABLET ORAL
Qty: 30 TAB | Refills: 6 | Status: SHIPPED | OUTPATIENT
Start: 2017-11-16 | End: 2018-11-12 | Stop reason: SDUPTHER

## 2017-11-16 RX ORDER — NEBIVOLOL 10 MG/1
TABLET ORAL
Qty: 60 TAB | Refills: 3 | Status: SHIPPED | OUTPATIENT
Start: 2017-11-16 | End: 2018-11-12 | Stop reason: SDUPTHER

## 2017-11-16 RX ORDER — POLYETHYLENE GLYCOL 3350 17 G/17G
POWDER, FOR SOLUTION ORAL
Qty: 527 G | Refills: 1 | Status: SHIPPED | OUTPATIENT
Start: 2017-11-16 | End: 2018-03-07 | Stop reason: SDUPTHER

## 2017-11-16 RX ORDER — DOCUSATE SODIUM 100 MG/1
100 CAPSULE, LIQUID FILLED ORAL 2 TIMES DAILY
COMMUNITY
End: 2018-04-11 | Stop reason: ALTCHOICE

## 2017-11-16 RX ORDER — CETIRIZINE HCL 10 MG
10 TABLET ORAL DAILY
Qty: 30 TAB | Refills: 12 | Status: SHIPPED | OUTPATIENT
Start: 2017-11-16 | End: 2018-11-12 | Stop reason: SDUPTHER

## 2017-11-16 RX ORDER — POTASSIUM CHLORIDE 750 MG/1
TABLET, FILM COATED, EXTENDED RELEASE ORAL
Qty: 30 TAB | Refills: 5 | Status: SHIPPED | OUTPATIENT
Start: 2017-11-16 | End: 2018-08-02 | Stop reason: SDUPTHER

## 2017-11-16 RX ORDER — LISINOPRIL AND HYDROCHLOROTHIAZIDE 20; 25 MG/1; MG/1
TABLET ORAL
Qty: 30 TAB | Refills: 6 | Status: SHIPPED | OUTPATIENT
Start: 2017-11-16 | End: 2018-07-11 | Stop reason: SDUPTHER

## 2017-11-16 NOTE — MR AVS SNAPSHOT
Visit Information Date & Time Provider Department Dept. Phone Encounter #  
 11/16/2017  3:15 PM Mandie Basilio MD [address] [facility] OFFICE-ANNEX [phone] [id number] Follow-up Instructions Return in about 6 months (around 5/16/2018), or if symptoms worsen or fail to improve. Your Appointments 1/25/2018 11:30 AM  
CONSULT with Darci Brock NP [address] [facility] ([address]) Appt Note: follow up batool [address] [facility] 99 [phone]  
  
   
 [place] [address] [facility] [id number] [address] [place] Upcoming Health Maintenance Date Due  
 MEDICARE YEARLY EXAM 10/3/2015 EYE EXAM RETINAL OR DILATED Q1 7/9/2016 LIPID PANEL Q1 11/10/2016 FOOT EXAM Q1 3/23/2017 Influenza Age 5 to Adult 8/1/2017 HEMOGLOBIN A1C Q6M 1/24/2018 MICROALBUMIN Q1 7/25/2018 DTaP/Tdap/Td series (2 - Td) 8/12/2023 Allergies as of 11/16/2017  Review Complete On: 11/16/2017 By: Lana Cabrera Severity Noted Reaction Type Reactions Zofran [Ondansetron Hcl] High 11/11/2011    Itching Arm turned red & itched No Known Allergies Medium   Hives Current Immunizations  Reviewed on 11/5/2015 Name Date Influenza Vaccine 10/22/2014 Influenza Vaccine (Quad) PF 12/1/2016, 11/5/2015 Influenza Vaccine PF 11/16/2017, 11/7/2013 Influenza Vaccine Split 9/26/2012, 12/14/2011, 10/26/2009 PPD 9/26/2012 Pneumococcal Polysaccharide (PPSV-23) 8/12/2013 TB Skin Test (PPD) Intradermal 11/10/2015, 10/2/2014, 9/16/2013, 8/12/2013 Tdap 8/12/2013 Not reviewed this visit You Were Diagnosed With   
  
 Codes Comments Encounter for immunization    -  Primary ICD-10-CM: N96 ICD-9-CM: V03.89 Vitals  BP Pulse Resp Height(growth percentile) Weight(growth percentile) BMI  
 128/75 (BP 1 Location: Left arm, BP Patient Position: Sitting) 72 14 5' 4\" (1.626 m) 125 lb 3.2 oz (56.8 kg) 21.49 kg/m2 Smoking Status Never Smoker Vitals History BMI and BSA Data Body Mass Index Body Surface Area  
 21.49 kg/m 2 1.6 m 2 Preferred Pharmacy Pharmacy Name Phone Chiara Gomez, Gael Pierce [phone] Your Updated Medication List  
  
   
This list is accurate as of: 11/16/17  4:57 PM.  Always use your most recent med list.  
  
  
  
  
 albuterol 90 mcg/actuation inhaler Commonly known as:  PROVENTIL HFA, VENTOLIN HFA, PROAIR HFA Take 1 puff by inhalation every four (4) hours as needed for Wheezing. cetirizine 10 mg tablet Commonly known as:  ZYRTEC Take 1 Tab by mouth daily. For allergies  
  
 chlorhexidine 0.12 % solution Commonly known as:  PERIDEX 15 mL by Swish and Spit route two (2) times a day. ciclopirox 8 % solution Commonly known as:  PENLAC  
APPLY TO AFFECTED AREA TWICE DAILY EXTERNALLY AS DIRECTED.  
  
 cloNIDine HCl 0.1 mg tablet Commonly known as:  CATAPRES  
TAKE ONE TABLET BY MOUTH AT BEDTIME  
  
 DOC-Q-LACE 100 mg capsule Generic drug:  docusate sodium Take 100 mg by mouth two (2) times a day. LASTACAFT 0.25 % Drop Generic drug:  alcaftadine Apply  to eye.  
  
 lisinopril-hydroCHLOROthiazide 20-25 mg per tablet Commonly known as:  PRINZIDE, ZESTORETIC  
TAKE 1 TABLET BY MOUTH DAILY  
  
 nebivolol 10 mg tablet Commonly known as:  BYSTOLIC  
TAKE 1 TABLET BY MOUTH TWICE A DAY  
  
 OCUSOFT EYELID CLEANSING PADS Pads Generic drug:  [employer]  
by Does Not Apply route. polyethylene glycol 17 gram/dose powder Commonly known as:  Beola Isak MIX 1 CAPFUL IN GLASS OF WATER & DRINK Once DAILY as needed FOR CONSTIPATION, no BM for 48 hrs and thenhold for loose bowl movement  
  
 potassium chloride SR 10 mEq tablet Commonly known as:  KLOR-CON 10  
TAKE 1 TABLET BY MOUTH DAILY Prescriptions Sent to Pharmacy Refills  
 nebivolol (BYSTOLIC) 10 mg tablet 3 Sig: TAKE 1 TABLET BY MOUTH TWICE A DAY Class: Normal  
 Pharmacy: [address] [place] [address] Ph #: [phone]  
 cloNIDine HCl (CATAPRES) 0.1 mg tablet 6 Sig: TAKE ONE TABLET BY MOUTH AT BEDTIME Class: Normal  
 Pharmacy: [address] [place] [address] Ph #: [phone]  
 potassium chloride SR (KLOR-CON 10) 10 mEq tablet 5 Sig: TAKE 1 TABLET BY MOUTH DAILY Class: Normal  
 Pharmacy: [address] [place] [address] Ph #: [phone]  
 lisinopril-hydroCHLOROthiazide (PRINZIDE, ZESTORETIC) 20-25 mg per tablet 6 Sig: TAKE 1 TABLET BY MOUTH DAILY Class: Normal  
 Pharmacy: [address] [place] [address] Ph #: [phone]  
 polyethylene glycol (MIRALAX) 17 gram/dose powder 1 Sig: MIX 1 CAPFUL IN GLASS OF WATER & DRINK Once DAILY as needed FOR CONSTIPATION, no BM for 48 hrs and thenhold for loose bowl movement Class: Normal  
 Pharmacy: [address] [place] [address] Ph #: [phone]  
 cetirizine (ZYRTEC) 10 mg tablet 12 Sig: Take 1 Tab by mouth daily. For allergies Class: Normal  
 Pharmacy: [address] [place] [address] Ph #: [phone] Route: Oral  
  
We Performed the Following INFLUENZA VIRUS VACCINE, PRESERVATIVE FREE SYRINGE, 3 YRS AND OLDER [91265 CPT(R)] Follow-up Instructions Return in about 6 months (around 5/16/2018), or if symptoms worsen or fail to improve. Patient Instructions Vaccine Information Statement Influenza (Flu) Vaccine (Inactivated or Recombinant): What you need to know Many Vaccine Information Statements are available in Macedonian and other languages. See www.immunize.org/vis Hojas de Información Sobre Vacunas están disponibles en Español y en muchos otros idiomas. Visite www.immunize.org/vis 1. Why get vaccinated? Influenza (flu) is a contagious disease that spreads around the [place] every year, usually between October and May. Flu is caused by influenza viruses, and is spread mainly by coughing, sneezing, and close contact. Anyone can get flu. Flu strikes suddenly and can last several days. Symptoms vary by age, but can include: 
 fever/chills  sore throat  muscle aches  fatigue  cough  headache  runny or stuffy nose Flu can also lead to pneumonia and blood infections, and cause diarrhea and seizures in children. If you have a medical condition, such as heart or lung disease, flu can make it worse. Flu is more dangerous for some people. Infants and young children, people 72years of age and older, pregnant women, and people with certain health conditions or a weakened immune system are at greatest risk. Each year thousands of people in the [facility] die from flu, and many more are hospitalized. Flu vaccine can: 
 keep you from getting flu, 
 make flu less severe if you do get it, and 
 keep you from spreading flu to your family and other people. 2. Inactivated and recombinant flu vaccines A dose of flu vaccine is recommended every flu season. Children 6 months through 6years of age may need two doses during the same flu season. Everyone else needs only one dose each flu season. Some inactivated flu vaccines contain a very small amount of a mercury-based preservative called thimerosal. Studies have not shown thimerosal in vaccines to be harmful, but flu vaccines that do not contain thimerosal are available. There is no live flu virus in flu shots. They cannot cause the flu. There are many flu viruses, and they are always changing.  Each year a new flu vaccine is made to protect against three or four viruses that are likely to cause disease in the upcoming flu season. But even when the vaccine doesnt exactly match these viruses, it may still provide some protection Flu vaccine cannot prevent: 
 flu that is caused by a virus not covered by the vaccine, or 
 illnesses that look like flu but are not. It takes about 2 weeks for protection to develop after vaccination, and protection lasts through the flu season. 3. Some people should not get this vaccine Tell the person who is giving you the vaccine:  If you have any severe, life-threatening allergies. If you ever had a life-threatening allergic reaction after a dose of flu vaccine, or have a severe allergy to any part of this vaccine, you may be advised not to get vaccinated. Most, but not all, types of flu vaccine contain a small amount of egg protein.  If you ever had Guillain-Barré Syndrome (also called GBS). Some people with a history of GBS should not get this vaccine. This should be discussed with your doctor.  If you are not feeling well. It is usually okay to get flu vaccine when you have a mild illness, but you might be asked to come back when you feel better. 4. Risks of a vaccine reaction With any medicine, including vaccines, there is a chance of reactions. These are usually mild and go away on their own, but serious reactions are also possible. Most people who get a flu shot do not have any problems with it. Minor problems following a flu shot include:  
 soreness, redness, or swelling where the shot was given  hoarseness  sore, red or itchy eyes  cough  fever  aches  headache  itching  fatigue If these problems occur, they usually begin soon after the shot and last 1 or 2 days. More serious problems following a flu shot can include the following:  There may be a small increased risk of Guillain-Barré Syndrome (GBS) after inactivated flu vaccine. This risk has been estimated at 1 or 2 additional cases per million people vaccinated. This is much lower than the risk of severe complications from flu, which can be prevented by flu vaccine.  Young children who get the flu shot along with pneumococcal vaccine (PCV13) and/or DTaP vaccine at the same time might be slightly more likely to have a seizure caused by fever. Ask your doctor for more information. Tell your doctor if a child who is getting flu vaccine has ever had a seizure. Problems that could happen after any injected vaccine:  People sometimes faint after a medical procedure, including vaccination. Sitting or lying down for about 15 minutes can help prevent fainting, and injuries caused by a fall. Tell your doctor if you feel dizzy, or have vision changes or ringing in the ears.  Some people get severe pain in the shoulder and have difficulty moving the arm where a shot was given. This happens very rarely.  Any medication can cause a severe allergic reaction. Such reactions from a vaccine are very rare, estimated at about 1 in a million doses, and would happen within a few minutes to a few hours after the vaccination. As with any medicine, there is a very remote chance of a vaccine causing a serious injury or death. The safety of vaccines is always being monitored. For more information, visit: www.cdc.gov/vaccinesafety/ 
 
5. What if there is a serious reaction? What should I look for?  Look for anything that concerns you, such as signs of a severe allergic reaction, very high fever, or unusual behavior. Signs of a severe allergic reaction can include hives, swelling of the face and throat, difficulty breathing, a fast heartbeat, dizziness, and weakness  usually within a few minutes to a few hours after the vaccination. What should I do?  
 
 If you think it is a severe allergic reaction or other emergency that cant wait, call 9-1-1 and get the person to the nearest hospital. Otherwise, call your doctor.  Reactions should be reported to the Vaccine Adverse Event Reporting System (VAERS). Your doctor should file this report, or you can do it yourself through  the VAERS web site at www.vaers. [facility].gov, or by calling 2-152.714.6358. VAERS does not give medical advice. 6. The National Vaccine Injury Compensation Program 
 
The [facility] Vaccine Injury Compensation Program (VICP) is a federal program that was created to compensate people who may have been injured by certain vaccines. Persons who believe they may have been injured by a vaccine can learn about the program and about filing a claim by calling 9-652.906.9833 or visiting the [employer] website at www.[facility].gov/vaccinecompensation. There is a time limit to file a claim for compensation. 7. How can I learn more?  Ask your healthcare provider. He or she can give you the vaccine package insert or suggest other sources of information.  Call your local or state health department.  Contact the Centers for Disease Control and Prevention (CDC): 
- Call 4-225.929.9000 (1-800-CDC-INFO) or 
- Visit CDCs website at www.cdc.gov/flu Vaccine Information Statement Inactivated Influenza Vaccine 8/7/2015 
42 [facility] [id number] [facility] and [place] Centers for Disease Control and Prevention Office Use Only Vaccine Information Statement Influenza (Flu) Vaccine (Inactivated or Recombinant): What you need to know Many Vaccine Information Statements are available in [place] and other languages. See www.immunize.org/vis Hojas de Información Sobre Vacunas están disponibles en Español y en muchos otros idiomas. Visite www.immunize.org/vis 1. Why get vaccinated? Influenza (flu) is a contagious disease that spreads around the [place] every year, usually between October and May. Flu is caused by influenza viruses, and is spread mainly by coughing, sneezing, and close contact. Anyone can get flu. Flu strikes suddenly and can last several days. Symptoms vary by age, but can include: 
 fever/chills  sore throat  muscle aches  fatigue  cough  headache  runny or stuffy nose Flu can also lead to pneumonia and blood infections, and cause diarrhea and seizures in children. If you have a medical condition, such as heart or lung disease, flu can make it worse. Flu is more dangerous for some people. Infants and young children, people 72years of age and older, pregnant women, and people with certain health conditions or a weakened immune system are at greatest risk. Each year thousands of people in the [facility] die from flu, and many more are hospitalized. Flu vaccine can: 
 keep you from getting flu, 
 make flu less severe if you do get it, and 
 keep you from spreading flu to your family and other people. 2. Inactivated and recombinant flu vaccines A dose of flu vaccine is recommended every flu season. Children 6 months through 6years of age may need two doses during the same flu season. Everyone else needs only one dose each flu season. Some inactivated flu vaccines contain a very small amount of a mercury-based preservative called thimerosal. Studies have not shown thimerosal in vaccines to be harmful, but flu vaccines that do not contain thimerosal are available. There is no live flu virus in flu shots. They cannot cause the flu. There are many flu viruses, and they are always changing. Each year a new flu vaccine is made to protect against three or four viruses that are likely to cause disease in the upcoming flu season. But even when the vaccine doesnt exactly match these viruses, it may still provide some protection Flu vaccine cannot prevent: 
 flu that is caused by a virus not covered by the vaccine, or 
  illnesses that look like flu but are not. It takes about 2 weeks for protection to develop after vaccination, and protection lasts through the flu season. 3. Some people should not get this vaccine Tell the person who is giving you the vaccine:  If you have any severe, life-threatening allergies. If you ever had a life-threatening allergic reaction after a dose of flu vaccine, or have a severe allergy to any part of this vaccine, you may be advised not to get vaccinated. Most, but not all, types of flu vaccine contain a small amount of egg protein.  If you ever had Guillain-Barré Syndrome (also called GBS). Some people with a history of GBS should not get this vaccine. This should be discussed with your doctor.  If you are not feeling well. It is usually okay to get flu vaccine when you have a mild illness, but you might be asked to come back when you feel better. 4. Risks of a vaccine reaction With any medicine, including vaccines, there is a chance of reactions. These are usually mild and go away on their own, but serious reactions are also possible. Most people who get a flu shot do not have any problems with it. Minor problems following a flu shot include:  
 soreness, redness, or swelling where the shot was given  hoarseness  sore, red or itchy eyes  cough  fever  aches  headache  itching  fatigue If these problems occur, they usually begin soon after the shot and last 1 or 2 days. More serious problems following a flu shot can include the following:  There may be a small increased risk of Guillain-Barré Syndrome (GBS) after inactivated flu vaccine. This risk has been estimated at 1 or 2 additional cases per million people vaccinated. This is much lower than the risk of severe complications from flu, which can be prevented by flu vaccine.    
 
 Young children who get the flu shot along with pneumococcal vaccine (PCV13) and/or DTaP vaccine at the same time might be slightly more likely to have a seizure caused by fever. Ask your doctor for more information. Tell your doctor if a child who is getting flu vaccine has ever had a seizure. Problems that could happen after any injected vaccine:  People sometimes faint after a medical procedure, including vaccination. Sitting or lying down for about 15 minutes can help prevent fainting, and injuries caused by a fall. Tell your doctor if you feel dizzy, or have vision changes or ringing in the ears.  Some people get severe pain in the shoulder and have difficulty moving the arm where a shot was given. This happens very rarely.  Any medication can cause a severe allergic reaction. Such reactions from a vaccine are very rare, estimated at about 1 in a million doses, and would happen within a few minutes to a few hours after the vaccination. As with any medicine, there is a very remote chance of a vaccine causing a serious injury or death. The safety of vaccines is always being monitored. For more information, visit: www.cdc.gov/vaccinesafety/ 
 
 
The [facility] Vaccine Injury Compensation Program (VICP) is a federal program that was created to compensate people who may have been injured by certain vaccines. Persons who believe they may have been injured by a vaccine can learn about the program and about filing a claim by calling 9-780.415.8959 or visiting the [employer].com website at www.[facility].gov/vaccinecompensation. There is a time limit to file a claim for compensation. 7. How can I learn more?  Ask your healthcare provider. He or she can give you the vaccine package insert or suggest other sources of information.  Call your local or state health department.  Contact the Centers for Disease Control and Prevention (CDC): 
- Call 2-314.139.1047 (2-059-NSV-INFO) or 
- Visit CDCs website at www.cdc.gov/flu Vaccine Information Statement Inactivated Influenza Vaccine 8/7/2015 
42 [address] [facility] [id number]-74 [facility] [employer] Centers for Disease Control and Prevention Office Use Only [facility]! Dear Josef Hanna: 
Thank you for requesting a [employer] account. Our records indicate that you have previously registered for a [employer] account but its currently inactive. Please call our [employer] support line at [phone]. Additional Information If you have questions, please visit the Frequently Asked Questions section of the [employer] website at [email or web address]. [email or web address] Remember, [employer] is NOT to be used for urgent needs. For medical emergencies, dial 911. Now available from your iPhone and Android! Please provide this summary of care documentation to your next provider. Your primary care clinician is listed as Claudis Phalen. If you have any questions after today's visit, please call [phone].

## 2017-11-16 NOTE — PATIENT INSTRUCTIONS
Vaccine Information Statement    Influenza (Flu) Vaccine (Inactivated or Recombinant): What you need to know    Many Vaccine Information Statements are available in Yi and other languages. See www.immunize.org/vis  Hojas de Información Sobre Vacunas están disponibles en Español y en muchos otros idiomas. Visite www.immunize.org/vis    1. Why get vaccinated? Influenza (flu) is a contagious disease that spreads around the [place] every year, usually between October and May. Flu is caused by influenza viruses, and is spread mainly by coughing, sneezing, and close contact. Anyone can get flu. Flu strikes suddenly and can last several days. Symptoms vary by age, but can include:   fever/chills   sore throat   muscle aches   fatigue   cough   headache    runny or stuffy nose    Flu can also lead to pneumonia and blood infections, and cause diarrhea and seizures in children. If you have a medical condition, such as heart or lung disease, flu can make it worse. Flu is more dangerous for some people. Infants and young children, people 72years of age and older, pregnant women, and people with certain health conditions or a weakened immune system are at greatest risk. Each year thousands of people in the [facility] die from flu, and many more are hospitalized. Flu vaccine can:   keep you from getting flu,   make flu less severe if you do get it, and   keep you from spreading flu to your family and other people. 2. Inactivated and recombinant flu vaccines    A dose of flu vaccine is recommended every flu season. Children 6 months through 6years of age may need two doses during the same flu season. Everyone else needs only one dose each flu season.        Some inactivated flu vaccines contain a very small amount of a mercury-based preservative called thimerosal. Studies have not shown thimerosal in vaccines to be harmful, but flu vaccines that do not contain thimerosal are available. There is no live flu virus in flu shots. They cannot cause the flu. There are many flu viruses, and they are always changing. Each year a new flu vaccine is made to protect against three or four viruses that are likely to cause disease in the upcoming flu season. But even when the vaccine doesnt exactly match these viruses, it may still provide some protection    Flu vaccine cannot prevent:   flu that is caused by a virus not covered by the vaccine, or   illnesses that look like flu but are not. It takes about 2 weeks for protection to develop after vaccination, and protection lasts through the flu season. 3. Some people should not get this vaccine    Tell the person who is giving you the vaccine:     If you have any severe, life-threatening allergies. If you ever had a life-threatening allergic reaction after a dose of flu vaccine, or have a severe allergy to any part of this vaccine, you may be advised not to get vaccinated. Most, but not all, types of flu vaccine contain a small amount of egg protein.  If you ever had Guillain-Barré Syndrome (also called GBS). Some people with a history of GBS should not get this vaccine. This should be discussed with your doctor.  If you are not feeling well. It is usually okay to get flu vaccine when you have a mild illness, but you might be asked to come back when you feel better. 4. Risks of a vaccine reaction    With any medicine, including vaccines, there is a chance of reactions. These are usually mild and go away on their own, but serious reactions are also possible. Most people who get a flu shot do not have any problems with it.      Minor problems following a flu shot include:    soreness, redness, or swelling where the shot was given     hoarseness   sore, red or itchy eyes   cough   fever   aches   headache   itching   fatigue  If these problems occur, they usually begin soon after the shot and last 1 or 2 days. More serious problems following a flu shot can include the following:     There may be a small increased risk of Guillain-Barré Syndrome (GBS) after inactivated flu vaccine. This risk has been estimated at 1 or 2 additional cases per million people vaccinated. This is much lower than the risk of severe complications from flu, which can be prevented by flu vaccine.  Young children who get the flu shot along with pneumococcal vaccine (PCV13) and/or DTaP vaccine at the same time might be slightly more likely to have a seizure caused by fever. Ask your doctor for more information. Tell your doctor if a child who is getting flu vaccine has ever had a seizure. Problems that could happen after any injected vaccine:      People sometimes faint after a medical procedure, including vaccination. Sitting or lying down for about 15 minutes can help prevent fainting, and injuries caused by a fall. Tell your doctor if you feel dizzy, or have vision changes or ringing in the ears.  Some people get severe pain in the shoulder and have difficulty moving the arm where a shot was given. This happens very rarely.  Any medication can cause a severe allergic reaction. Such reactions from a vaccine are very rare, estimated at about 1 in a million doses, and would happen within a few minutes to a few hours after the vaccination. As with any medicine, there is a very remote chance of a vaccine causing a serious injury or death. The safety of vaccines is always being monitored. For more information, visit: www.cdc.gov/vaccinesafety/    5. What if there is a serious reaction? What should I look for?  Look for anything that concerns you, such as signs of a severe allergic reaction, very high fever, or unusual behavior.     Signs of a severe allergic reaction can include hives, swelling of the face and throat, difficulty breathing, a fast heartbeat, dizziness, and weakness  usually within a few minutes to a few hours after the vaccination. What should I do?  If you think it is a severe allergic reaction or other emergency that cant wait, call 9-1-1 and get the person to the nearest hospital. Otherwise, call your doctor.  Reactions should be reported to the Vaccine Adverse Event Reporting System (VAERS). Your doctor should file this report, or you can do it yourself through  the VAERS web site at www.vaers. [facility].gov, or by calling 7-681.159.4251. VAERS does not give medical advice. 6. The National Vaccine Injury Compensation Program    The [facility] Vaccine Injury Compensation Program (VICP) is a federal program that was created to compensate people who may have been injured by certain vaccines. Persons who believe they may have been injured by a vaccine can learn about the program and about filing a claim by calling 6-204.956.4720 or visiting the [employer] website at www.[facility].gov/vaccinecompensation. There is a time limit to file a claim for compensation. 7. How can I learn more?  Ask your healthcare provider. He or she can give you the vaccine package insert or suggest other sources of information.  Call your local or state health department.  Contact the Centers for Disease Control and Prevention (CDC):  - Call 0-409.443.5281 (1-800-CDC-INFO) or  - Visit CDCs website at www.cdc.gov/flu    Vaccine Information Statement   Inactivated Influenza Vaccine   8/7/2015  42 ANJALI Mathisquest [id number]-[id number]    Department of Health and Human Services  Centers for Disease Control and Prevention    Office Use Only      Vaccine Information Statement    Influenza (Flu) Vaccine (Inactivated or Recombinant): What you need to know    Many Vaccine Information Statements are available in [place]oruba and other languages. See www.immunize.org/vis  Hojas de Información Sobre Vacunas están disponibles en Español y en muchos otros idiomas. Visite www.immunize.org/vis    1. Why get vaccinated?     Influenza (flu) is a contagious disease that spreads around the [place] every year, usually between October and May. Flu is caused by influenza viruses, and is spread mainly by coughing, sneezing, and close contact. Anyone can get flu. Flu strikes suddenly and can last several days. Symptoms vary by age, but can include:   fever/chills   sore throat   muscle aches   fatigue   cough   headache    runny or stuffy nose    Flu can also lead to pneumonia and blood infections, and cause diarrhea and seizures in children. If you have a medical condition, such as heart or lung disease, flu can make it worse. Flu is more dangerous for some people. Infants and young children, people 72years of age and older, pregnant women, and people with certain health conditions or a weakened immune system are at greatest risk. Each year thousands of people in the [facility] die from flu, and many more are hospitalized. Flu vaccine can:   keep you from getting flu,   make flu less severe if you do get it, and   keep you from spreading flu to your family and other people. 2. Inactivated and recombinant flu vaccines    A dose of flu vaccine is recommended every flu season. Children 6 months through 6years of age may need two doses during the same flu season. Everyone else needs only one dose each flu season. Some inactivated flu vaccines contain a very small amount of a mercury-based preservative called thimerosal. Studies have not shown thimerosal in vaccines to be harmful, but flu vaccines that do not contain thimerosal are available. There is no live flu virus in flu shots. They cannot cause the flu. There are many flu viruses, and they are always changing. Each year a new flu vaccine is made to protect against three or four viruses that are likely to cause disease in the upcoming flu season.  But even when the vaccine doesnt exactly match these viruses, it may still provide some protection    Flu vaccine cannot prevent:   flu that is caused by a virus not covered by the vaccine, or   illnesses that look like flu but are not. It takes about 2 weeks for protection to develop after vaccination, and protection lasts through the flu season. 3. Some people should not get this vaccine    Tell the person who is giving you the vaccine:     If you have any severe, life-threatening allergies. If you ever had a life-threatening allergic reaction after a dose of flu vaccine, or have a severe allergy to any part of this vaccine, you may be advised not to get vaccinated. Most, but not all, types of flu vaccine contain a small amount of egg protein.  If you ever had Guillain-Barré Syndrome (also called GBS). Some people with a history of GBS should not get this vaccine. This should be discussed with your doctor.  If you are not feeling well. It is usually okay to get flu vaccine when you have a mild illness, but you might be asked to come back when you feel better. 4. Risks of a vaccine reaction    With any medicine, including vaccines, there is a chance of reactions. These are usually mild and go away on their own, but serious reactions are also possible. Most people who get a flu shot do not have any problems with it. Minor problems following a flu shot include:    soreness, redness, or swelling where the shot was given     hoarseness   sore, red or itchy eyes   cough   fever   aches   headache   itching   fatigue  If these problems occur, they usually begin soon after the shot and last 1 or 2 days. More serious problems following a flu shot can include the following:     There may be a small increased risk of Guillain-Barré Syndrome (GBS) after inactivated flu vaccine. This risk has been estimated at 1 or 2 additional cases per million people vaccinated. This is much lower than the risk of severe complications from flu, which can be prevented by flu vaccine.  Young children who get the flu shot along with pneumococcal vaccine (PCV13) and/or DTaP vaccine at the same time might be slightly more likely to have a seizure caused by fever. Ask your doctor for more information. Tell your doctor if a child who is getting flu vaccine has ever had a seizure. Problems that could happen after any injected vaccine:      People sometimes faint after a medical procedure, including vaccination. Sitting or lying down for about 15 minutes can help prevent fainting, and injuries caused by a fall. Tell your doctor if you feel dizzy, or have vision changes or ringing in the ears.  Some people get severe pain in the shoulder and have difficulty moving the arm where a shot was given. This happens very rarely.  Any medication can cause a severe allergic reaction. Such reactions from a vaccine are very rare, estimated at about 1 in a million doses, and would happen within a few minutes to a few hours after the vaccination. As with any medicine, there is a very remote chance of a vaccine causing a serious injury or death. The safety of vaccines is always being monitored. For more information, visit: www.cdc.gov/vaccinesafety/    5. What if there is a serious reaction? What should I look for?  Look for anything that concerns you, such as signs of a severe allergic reaction, very high fever, or unusual behavior. Signs of a severe allergic reaction can include hives, swelling of the face and throat, difficulty breathing, a fast heartbeat, dizziness, and weakness  usually within a few minutes to a few hours after the vaccination. What should I do?  If you think it is a severe allergic reaction or other emergency that cant wait, call 9-1-1 and get the person to the nearest hospital. Otherwise, call your doctor.  Reactions should be reported to the Vaccine Adverse Event Reporting System (VAERS).  Your doctor should file this report, or you can do it yourself through  the VAERS web site at www.vaers. [facility].gov, or by calling 1-133.232.5919. VAERS does not give medical advice. 6. The National Vaccine Injury Compensation Program    The [facility] Vaccine Injury Compensation Program (VICP) is a federal program that was created to compensate people who may have been injured by certain vaccines. Persons who believe they may have been injured by a vaccine can learn about the program and about filing a claim by calling 0-281.356.1171 or visiting the [employer] [address] website at www.[facility].gov/vaccinecompensation. There is a time limit to file a claim for compensation. 7. How can I learn more?  Ask your healthcare provider. He or she can give you the vaccine package insert or suggest other sources of information.  Call your local or state health department.  Contact the Centers for Disease Control and Prevention (CDC):  - Call 5-549.543.8240 (1-800-CDC-INFO) or  - Visit CDCs website at www.cdc.gov/flu    Vaccine Information Statement   Inactivated Influenza Vaccine   8/7/2015  42 UFlori Srivastavaell Flaming [id number]    Department of Health and Human Services  Centers for Disease Control and Prevention    Office Use Only

## 2017-11-16 NOTE — PROGRESS NOTES
Name and  Verified    Chief Complaint   Patient presents with   [facility] Follow Up     ER f/u for seizure     1. Have you been to the ER, urgent care clinic since your last visit? Hospitalized since your last visit? Yes, [facility], for seizure    2. Have you seen or consulted any other health care providers outside of the [address] [place] [address] since your last visit? Include any pap smears or colon screening.  No

## 2017-11-16 NOTE — PROGRESS NOTES
HISTORY OF PRESENT ILLNESS  Christiana Cooper is a 38 y.o. male. HPI   Patient with severe mental retardation presented with  stating that the blood pressure nicely controlled patient takes her medication alert but not mentally functional does not follow commands history of constipation for which he was given MiraLAX and Colace to be taken as needed patient's case provider stating that he has not had any constipation recently  Present with the  stating that pt had one time siezural Dz for 2-3 min, no passing out, this the first time,   His blood pressure check in addition at the group home has been normal on the checkup today's patient is some refills otherwise has no other complaint patient's requesting to be on seizure medication for history of 1 episode      Current Outpatient Prescriptions   Medication Sig Dispense Refill    docusate sodium (DOC-Q-LACE) 100 mg capsule Take 100 mg by mouth two (2) times a day.  BYSTOLIC 10 mg tablet TAKE 1 TABLET BY MOUTH TWICE A DAY 60 Tab PRN    cloNIDine HCl (CATAPRES) 0.1 mg tablet TAKE ONE TABLET BY MOUTH AT BEDTIME 30 Tab PRN    potassium chloride SR (KLOR-CON 10) 10 mEq tablet TAKE 1 TABLET BY MOUTH DAILY 30 Tab PRN    ciclopirox (PENLAC) 8 % solution APPLY TO AFFECTED AREA TWICE DAILY EXTERNALLY AS DIRECTED. 6.6 mL 0    polyethylene glycol (MIRALAX) 17 gram/dose powder MIX 1 CAPFUL (17 GM.) IN GLASS OF WATER & DRINK TWICE DAILY FOR CONSTIPATION 527 g 0    lisinopril-hydroCHLOROthiazide (PRINZIDE, ZESTORETIC) 20-25 mg per tablet TAKE 1 TABLET BY MOUTH DAILY 30 Tab PRN    cetirizine (ZYRTEC) 10 mg tablet Take 1 Tab by mouth daily. For allergies 30 Tab 12    M[employer] (OCUSOFT EYELID CLEANSING PADS) pads by Does Not Apply route.  chlorhexidine (PERIDEX) 0.12 % solution 15 mL by Swish and Spit route two (2) times a day.  alcaftadine (LASTACAFT) 0.25 % drop Apply  to eye.       albuterol (PROVENTIL HFA, VENTOLIN HFA, PROAIR HFA) 90 mcg/actuation inhaler Take 1 puff by inhalation every four (4) hours as needed for Wheezing. 1 Inhaler 1     Allergies   Allergen Reactions    Zofran [Ondansetron Hcl] Itching     Arm turned red & itched    No Known Allergies Hives     Past Medical History:   Diagnosis Date    Acute pharyngitis 9/29/2014    Bronchitis, acute 12/12/2013    Cellulitis of groin 3/23/2016    Contact dermatitis and other eczema, due to unspecified cause     Decrease in appetite 12/12/2013    Diabetes ([address]     Dysphagia 10/22/2014    Fall at nursing home 7/24/2017    Fever in adult 9/29/2014    Hypertension     Mental retardation     Prediabetes 10/2/2014    Screening for glaucoma 12/3/2014     Past Surgical History:   Procedure Laterality Date    HX HEENT      dental surg. June 7, 2010     Family History   Problem Relation Age of Onset    Family history unknown: Yes     Social History   Substance Use Topics    Smoking status: Never Smoker    Smokeless tobacco: Never Used    Alcohol use No      Lab Results  Component Value Date/Time   WBC 4.6 07/24/2017 01:13 PM   HGB 12.2 07/24/2017 01:13 PM   HCT 35.3 07/24/2017 01:13 PM   PLATELET 363 40/84/3523 01:13 PM   MCV 89 07/24/2017 01:13 PM     Lab Results  Component Value Date/Time   TSH 2.140 12/01/2016 10:27 AM         Review of Systems   Constitutional: Negative for chills and fever. HENT: Negative for ear pain and nosebleeds. Eyes: Negative for blurred vision, pain and discharge. Respiratory: Negative for shortness of breath. Cardiovascular: Negative for chest pain and leg swelling. Gastrointestinal: Negative for constipation, diarrhea, nausea and vomiting. Genitourinary: Negative for frequency. Musculoskeletal: Negative for joint pain. Skin: Negative for itching and rash. Neurological: Negative for headaches. Psychiatric/Behavioral: Negative for depression. The patient is not nervous/anxious.         Physical Exam Constitutional: He is oriented to person, place, and time. He appears well-developed and well-nourished. HENT:   Head: Normocephalic and atraumatic. Mouth/Throat: No oropharyngeal exudate. Eyes: Conjunctivae and EOM are normal.   Neck: Normal range of motion. Neck supple. Cardiovascular: Normal rate, regular rhythm and normal heart sounds. No murmur heard. Pulmonary/Chest: Effort normal and breath sounds normal. No respiratory distress. Abdominal: Soft. Bowel sounds are normal. He exhibits no distension. There is no rebound. Musculoskeletal: He exhibits no edema or tenderness. Neurological: He is alert and oriented to person, place, and time. Skin: Skin is warm. No erythema. Psychiatric: He has a normal mood and affect. His behavior is normal.   Nursing note and vitals reviewed. ASSESSMENT and PLAN  Diagnoses and all orders for this visit:    1. Encounter for immunization  -     Influenza virus vaccine (PF SYRINGE) trivalent, split, 3 years and older (ALL BRANDS)    Other orders  -     nebivolol (BYSTOLIC) 10 mg tablet; TAKE 1 TABLET BY MOUTH TWICE A DAY  -     cloNIDine HCl (CATAPRES) 0.1 mg tablet; TAKE ONE TABLET BY MOUTH AT BEDTIME  -     potassium chloride SR (KLOR-CON 10) 10 mEq tablet; TAKE 1 TABLET BY MOUTH DAILY  -     lisinopril-hydroCHLOROthiazide (PRINZIDE, ZESTORETIC) 20-25 mg per tablet; TAKE 1 TABLET BY MOUTH DAILY  -     polyethylene glycol (MIRALAX) 17 gram/dose powder; MIX 1 CAPFUL IN GLASS OF WATER & DRINK Once DAILY as needed FOR CONSTIPATION, no BM for 48 hrs and thenhold for loose bowl movement  -     cetirizine (ZYRTEC) 10 mg tablet; Take 1 Tab by mouth daily.  For allergies    At this time  There is no start patient on seizure medication patient case provider was informed continue to address the cause with better blood pressure control avoiding culprit for the repeat seizure provider was informed to be watchful if recurrent call PCP for further care agreed with today's recommendations

## 2017-11-17 NOTE — TELEPHONE ENCOUNTER
Contacted Mrs Evans Rosado and informed her we do not have any paperwork regarding Mr Madrigal. She voiced understanding and will call back if any further questions or concerns.

## 2017-11-27 PROBLEM — R56.9 SEIZURE (HCC): Status: ACTIVE | Noted: 2017-11-27
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
## 2017-12-07 DIAGNOSIS — E87.6 HYPOKALEMIA: ICD-10-CM
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
## 2017-12-07 DIAGNOSIS — R79.9 ELEVATED BUN: ICD-10-CM

## 2017-12-07 DIAGNOSIS — I10 ESSENTIAL HYPERTENSION: ICD-10-CM

## 2017-12-07 RX ORDER — CICLOPIROX 80 MG/ML
SOLUTION TOPICAL
Qty: 6.6 ML | Refills: 11 | Status: SHIPPED | OUTPATIENT
Start: 2017-12-07 | End: 2022-02-11 | Stop reason: ALTCHOICE

## 2018-01-25 ENCOUNTER — OFFICE VISIT (OUTPATIENT)
Dept: NEUROLOGY | Age: 43
End: 2018-01-25

## 2018-01-25 VITALS
HEIGHT: 64 IN | RESPIRATION RATE: 20 BRPM | SYSTOLIC BLOOD PRESSURE: 122 MMHG | BODY MASS INDEX: 22.88 KG/M2 | DIASTOLIC BLOOD PRESSURE: 72 MMHG | WEIGHT: 134 LBS

## 2018-01-25 DIAGNOSIS — R55 CONVULSIVE SYNCOPE: Primary | ICD-10-CM

## 2018-01-25 NOTE — PROGRESS NOTES
No more episodes since the very first one that he was seen about last time- he did have an EEG done they need results   His PCP decreased his miralax to only once daily instead of twice they said that might have been the reason for his seizures episode that he had before it could have caused an electrolyte imbalance  Was unable to get his pulse ox and pulse, and BP d/t him getting wound up when you reach for his hands and arm, it was attempted

## 2018-01-25 NOTE — MR AVS SNAPSHOT
[facility]ijamie Jallohlake[facility] [address] [facility] [id number] [phone] Patient: Jan Escudero MRN: [id number] Visit Information Date & Time Provider Department Dept. Phone Encounter #  
 1/25/2018 11:30 AM Phillip Álvarez  [facility] Neurology [facility] [phone] [id number] Upcoming Health Maintenance Date Due  
 MEDICARE YEARLY EXAM 10/3/2015 EYE EXAM RETINAL OR DILATED Q1 7/9/2016 LIPID PANEL Q1 11/10/2016 FOOT EXAM Q1 3/23/2017 HEMOGLOBIN A1C Q6M 1/24/2018 MICROALBUMIN Q1 7/25/2018 DTaP/Tdap/Td series (2 - Td) 8/12/2023 Allergies as of 1/25/2018  Review Complete On: 1/25/2018 By: Phillip Álvarez NP Severity Noted Reaction Type Reactions Zofran [Ondansetron Hcl] High 11/11/2011    Itching Arm turned red & itched No Known Allergies Medium   Hives Current Immunizations  Reviewed on 11/5/2015 Name Date Influenza Vaccine 10/22/2014 Influenza Vaccine (Quad) PF 12/1/2016, 11/5/2015 Influenza Vaccine PF 11/16/2017, 11/7/2013 Influenza Vaccine Split 9/26/2012, 12/14/2011, 10/26/2009 PPD 9/26/2012 Pneumococcal Polysaccharide (PPSV-23) 8/12/2013 TB Skin Test (PPD) Intradermal 11/10/2015, 10/2/2014, 9/16/2013, 8/12/2013 Tdap 8/12/2013 Not reviewed this visit You Were Diagnosed With   
  
 Codes Comments Convulsive syncope    -  Primary ICD-10-CM: R55 
ICD-9-CM: 780.2, 780.39 Vitals Resp Height(growth percentile) Weight(growth percentile) BMI Smoking Status 20 5' 4\" (1.626 m) 134 lb (60.8 kg) 23 kg/m2 Never Smoker BMI and BSA Data Body Mass Index Body Surface Area  
 23 kg/m 2 1.66 m 2 Preferred Pharmacy Pharmacy Name Phone [facility] Ho[facility] [address] [phone] Your Updated Medication List  
  
   
 This list is accurate as of: 1/25/18 12:26 PM.  Always use your most recent med list.  
  
  
  
  
 albuterol 90 mcg/actuation inhaler Commonly known as:  PROVENTIL HFA, VENTOLIN HFA, PROAIR HFA Take 1 puff by inhalation every four (4) hours as needed for Wheezing. cetirizine 10 mg tablet Commonly known as:  ZYRTEC Take 1 Tab by mouth daily. For allergies  
  
 chlorhexidine 0.12 % solution Commonly known as:  PERIDEX 15 mL by Swish and Spit route two (2) times a day. ciclopirox 8 % solution Commonly known as:  PENLAC  
APPLY TO AFFECTED AREA TWICE DAILY EXTERNALLY AS DIRECTED.  
  
 cloNIDine HCl 0.1 mg tablet Commonly known as:  CATAPRES  
TAKE ONE TABLET BY MOUTH AT BEDTIME  
  
 DOC-Q-LACE 100 mg capsule Generic drug:  docusate sodium Take 100 mg by mouth two (2) times a day. LASTACAFT 0.25 % Drop Generic drug:  alcaftadine Apply  to eye.  
  
 lisinopril-hydroCHLOROthiazide 20-25 mg per tablet Commonly known as:  PRINZIDE, ZESTORETIC  
TAKE 1 TABLET BY MOUTH DAILY  
  
 nebivolol 10 mg tablet Commonly known as:  BYSTOLIC  
TAKE 1 TABLET BY MOUTH TWICE A DAY  
  
 OCUSOFT EYELID CLEANSING PADS Pads Generic drug:  M[employer]  
by Does Not Apply route. polyethylene glycol 17 gram/dose powder Commonly known as:  Reche Agreste MIX 1 CAPFUL IN GLASS OF WATER & DRINK Once DAILY as needed FOR CONSTIPATION, no BM for 48 hrs and thenhold for loose bowl movement  
  
 potassium chloride SR 10 mEq tablet Commonly known as:  KLOR-CON 10  
TAKE 1 TABLET BY MOUTH DAILY Patient Instructions A Healthy Lifestyle: Care Instructions Your Care Instructions A healthy lifestyle can help you feel good, stay at a healthy weight, and have plenty of energy for both work and play. A healthy lifestyle is something you can share with your whole family.  
A healthy lifestyle also can lower your risk for serious health problems, such as high blood pressure, heart disease, and diabetes. You can follow a few steps listed below to improve your health and the health of your family. Follow-up care is a key part of your treatment and safety. Be sure to make and go to all appointments, and call your doctor if you are having problems. It's also a good idea to know your test results and keep a list of the medicines you take. How can you care for yourself at home? · Do not eat too much sugar, fat, or fast foods. You can still have dessert and treats now and then. The goal is moderation. · Start small to improve your eating habits. Pay attention to portion sizes, drink less juice and soda pop, and eat more fruits and vegetables. ¨ Eat a healthy amount of food. A 3-ounce serving of meat, for example, is about the size of a deck of cards. Fill the rest of your plate with vegetables and whole grains. ¨ Limit the amount of soda and sports drinks you have every day. Drink more water when you are thirsty. ¨ Eat at least 5 servings of fruits and vegetables every day. It may seem like a lot, but it is not hard to reach this goal. A serving or helping is 1 piece of fruit, 1 cup of vegetables, or 2 cups of leafy, raw vegetables. Have an apple or some carrot sticks as an afternoon snack instead of a candy bar. Try to have fruits and/or vegetables at every meal. 
· Make exercise part of your daily routine. You may want to start with simple activities, such as walking, bicycling, or slow swimming. Try to be active 30 to 60 minutes every day. You do not need to do all 30 to 60 minutes all at once. For example, you can exercise 3 times a day for 10 or 20 minutes. Moderate exercise is safe for most people, but it is always a good idea to talk to your doctor before starting an exercise program. 
· Keep moving. Wadie Daft the lawn, work in the garden, or [place]. Take the stairs instead of the elevator at work. · If you smoke, quit. People who smoke have an increased risk for heart attack, stroke, cancer, and other lung illnesses. Quitting is hard, but there are ways to boost your chance of quitting tobacco for good. ¨ Use nicotine gum, patches, or lozenges. ¨ Ask your doctor about stop-smoking programs and medicines. ¨ Keep trying. In addition to reducing your risk of diseases in the future, you will notice some benefits soon after you stop using tobacco. If you have shortness of breath or asthma symptoms, they will likely get better within a few weeks after you quit. · Limit how much alcohol you drink. Moderate amounts of alcohol (up to 2 drinks a day for men, 1 drink a day for women) are okay. But drinking too much can lead to liver problems, high blood pressure, and other health problems. Family health If you have a family, there are many things you can do together to improve your health. · Eat meals together as a family as often as possible. · Eat healthy foods. This includes fruits, vegetables, lean meats and dairy, and whole grains. · Include your family in your fitness plan. Most people think of activities such as jogging or tennis as the way to fitness, but there are many ways you and your family can be more active. Anything that makes you breathe hard and gets your heart pumping is exercise. Here are some tips: 
¨ Walk to do errands or to take your child to school or the bus. ¨ Go for a family bike ride after dinner instead of watching TV. Where can you learn more? Go to http://marie-angi.info/. Enter M246 in the search box to learn more about \"A Healthy Lifestyle: Care Instructions. \" Current as of: May 12, 2017 Content Version: 11.4 © 5632-6509 [employer]. Care instructions adapted under license by [employer] (which disclaims liability or warranty for this information).  If you have questions about a medical condition or this instruction, always ask your healthcare professional. [address] any warranty or liability for your use of this information. Introducing [facility]! Dear Ancelmo Tai: 
Thank you for requesting a [employer] account. Our records indicate that you have previously registered for a [employer] account but its currently inactive. Please call our [employer] support line at [phone]. Additional Information If you have questions, please visit the Frequently Asked Questions section of the [employer] website at [email or web address] Remember, [employer] is NOT to be used for urgent needs. For medical emergencies, dial 911. Now available from your iPhone and Android! Please provide this summary of care documentation to your next provider. Your primary care clinician is listed as Surendra Singer. If you have any questions after today's visit, please call [phone].

## 2018-01-25 NOTE — PROGRESS NOTES
Date:  18     Name:  Victor Manuel Khanna  :  1975  MRN:  [id number]     PCP:  Lana Bell MD    Chief Complaint   Patient presents with    Epilepsy     test results from EEG     HISTORY OF PRESENT ILLNESS: Follow up for seizure like activity, possibly convulsive syncope. Testing was normal. No new events. There was some question if the event was triggered by metabolic imbalance secondary to overuse of Miralax. Except as noted above, denies  fever, chills, cough. No CP or SOB. No dysuria, loss of bowel or bladder control. No Weight loss. Appetite good. Sleeping well. No sweats. No edema. No bruising or bleeding. No nausea or vomit. No diarrhea. No frequency, urgency, No depressive sxs. No anxiety. Denies sore throat, nasal congestion, nasal discharge, epistaxis, tinnitus, hearing loss, back pain, muscle pain, or joint pain. Current Outpatient Prescriptions   Medication Sig    ciclopirox (PENLAC) 8 % solution APPLY TO AFFECTED AREA TWICE DAILY EXTERNALLY AS DIRECTED.  docusate sodium (DOC-Q-LACE) 100 mg capsule Take 100 mg by mouth two (2) times a day.  nebivolol (BYSTOLIC) 10 mg tablet TAKE 1 TABLET BY MOUTH TWICE A DAY    cloNIDine HCl (CATAPRES) 0.1 mg tablet TAKE ONE TABLET BY MOUTH AT BEDTIME    potassium chloride SR (KLOR-CON 10) 10 mEq tablet TAKE 1 TABLET BY MOUTH DAILY    lisinopril-hydroCHLOROthiazide (PRINZIDE, ZESTORETIC) 20-25 mg per tablet TAKE 1 TABLET BY MOUTH DAILY    polyethylene glycol (MIRALAX) 17 gram/dose powder MIX 1 CAPFUL IN GLASS OF WATER & DRINK Once DAILY as needed FOR CONSTIPATION, no BM for 48 hrs and thenhold for loose bowl movement    cetirizine (ZYRTEC) 10 mg tablet Take 1 Tab by mouth daily. For allergies    Miscellaneous Medical Supply (OCUSOFT EYELID CLEANSING PADS) pads by Does Not Apply route.  chlorhexidine (PERIDEX) 0.12 % solution 15 mL by Swish and Spit route two (2) times a day.     alcaftadine (LASTACAFT) 0.25 % drop Apply  to eye.  albuterol (PROVENTIL HFA, VENTOLIN HFA, PROAIR HFA) 90 mcg/actuation inhaler Take 1 puff by inhalation every four (4) hours as needed for Wheezing. No current facility-administered medications for this visit. Allergies   Allergen Reactions    Zofran [Ondansetron Hcl] Itching     Arm turned red & itched    No Known Allergies Hives     Past Medical History:   Diagnosis Date    Acute pharyngitis 9/29/2014    Bronchitis, acute 12/12/2013    Cellulitis of groin 3/23/2016    Contact dermatitis and other eczema, due to unspecified cause     Decrease in appetite 12/12/2013    Diabetes ([facility] [address]     Dysphagia 10/22/2014    Fall at nursing home 7/24/2017    Fever in adult 9/29/2014    Hypertension     Mental retardation     Prediabetes 10/2/2014    Screening for glaucoma 12/3/2014    Seizure ([facility] [address] 11/27/2017     Past Surgical History:   Procedure Laterality Date    HX HEENT      dental surg. June 7, 2010     Social History     Social History    Marital status: SINGLE     Spouse name: N/A    Number of children: N/A    Years of education: N/A     Occupational History    Not on file. Social History Main Topics    Smoking status: Never Smoker    Smokeless tobacco: Never Used    Alcohol use No    Drug use: Not on file    Sexual activity: No     Other Topics Concern    Not on file     Social History Narrative     Family History   Problem Relation Age of Onset    Family history unknown: Yes       PHYSICAL EXAMINATION:    Visit Vitals    /72    Resp 20    Ht 5' 4\" (1.626 m)    Wt 60.8 kg (134 lb)    BMI 23 kg/m2     Non verbal. Unsteady gait requiring assistance with a gait belt. Alert and pleasant but clearly little comprehension. Does not follow commands. ASSESSMENT AND PLAN    ICD-10-CM ICD-9-CM    1. Convulsive syncope R55 780.2      780.39      One episode of possible seizure which by description sounded more like a convulsive syncope.  Continue to monitor but no treatment warranted at this time. Caregivers understand to call if he has any further events and if so may need AED at that time. Follow up in six months or sooner if needed. If no further events at that time, will plan to see him as needed    Jasvir Sosa

## 2018-01-25 NOTE — PATIENT INSTRUCTIONS

## 2018-03-28 ENCOUNTER — OFFICE VISIT (OUTPATIENT)
Dept: FAMILY MEDICINE CLINIC | Age: 43
End: 2018-03-28

## 2018-03-28 VITALS
WEIGHT: 130.8 LBS | RESPIRATION RATE: 14 BRPM | HEART RATE: 75 BPM | SYSTOLIC BLOOD PRESSURE: 111 MMHG | HEIGHT: 64 IN | DIASTOLIC BLOOD PRESSURE: 86 MMHG | TEMPERATURE: 95.7 F | BODY MASS INDEX: 22.33 KG/M2

## 2018-03-28 DIAGNOSIS — Z23 ENCOUNTER FOR IMMUNIZATION: ICD-10-CM

## 2018-03-28 DIAGNOSIS — Z00.00 ROUTINE GENERAL MEDICAL EXAMINATION AT A HEALTH CARE FACILITY: Primary | ICD-10-CM

## 2018-03-28 DIAGNOSIS — Z00.00 ROUTINE MEDICAL EXAM: ICD-10-CM

## 2018-03-28 DIAGNOSIS — R56.9 SEIZURE (HCC): ICD-10-CM

## 2018-03-28 DIAGNOSIS — R73.03 PREDIABETES: ICD-10-CM

## 2018-03-28 DIAGNOSIS — E78.2 MIXED HYPERLIPIDEMIA: ICD-10-CM

## 2018-03-28 DIAGNOSIS — E11.9 DIABETES MELLITUS WITHOUT COMPLICATION (HCC): ICD-10-CM

## 2018-03-28 DIAGNOSIS — I10 HTN, GOAL BELOW 130/80: ICD-10-CM

## 2018-03-28 DIAGNOSIS — Z11.1 SCREENING EXAMINATION FOR PULMONARY TUBERCULOSIS: ICD-10-CM

## 2018-03-28 LAB — HBA1C MFR BLD HPLC: 5.9 %

## 2018-03-28 RX ORDER — OMEPRAZOLE 40 MG/1
40 CAPSULE, DELAYED RELEASE ORAL DAILY
COMMUNITY
End: 2018-09-14 | Stop reason: SDUPTHER

## 2018-03-28 RX ORDER — FLUTICASONE PROPIONATE 50 MCG
2 SPRAY, SUSPENSION (ML) NASAL DAILY
COMMUNITY
End: 2018-06-01 | Stop reason: SDUPTHER

## 2018-03-28 NOTE — MR AVS SNAPSHOT
[address] [facility] [address] [address] 
[phone] Patient: Nelia Cabrera MRN: [id number] WSW:[id number] Visit Information Date & Time Provider Department Dept. Phone Encounter #  
 3/28/2018  2:00 PM Ketan Power MD [address] [facility] [phone] [id number] Follow-up Instructions Return in 2 days (on 3/30/2018), or if symptoms worsen or fail to improve. Your Appointments 7/26/2018 11:30 AM  
CONSULT with Elena Coffman NP [address] [facility] ([facility]) Appt Note: 6mo f/up convulsive syncope episode cr  
 [place] [address] [facility] [address] [address] [phone]  
  
   
 [place] [address] [id number] [address] [place] Upcoming Health Maintenance Date Due  
 EYE EXAM RETINAL OR DILATED Q1 7/9/2016 LIPID PANEL Q1 11/10/2016 FOOT EXAM Q1 3/23/2017 HEMOGLOBIN A1C Q6M 1/24/2018 MEDICARE YEARLY EXAM 3/14/2018 MICROALBUMIN Q1 7/25/2018 DTaP/Tdap/Td series (2 - Td) 8/12/2023 Allergies as of 3/28/2018  Review Complete On: 3/28/2018 By: Ketan Power MD  
  
 Severity Noted Reaction Type Reactions Zofran [Ondansetron Hcl] High 11/11/2011    Itching Arm turned red & itched No Known Allergies Medium   Hives Current Immunizations  Reviewed on 11/5/2015 Name Date Influenza Vaccine 10/22/2014 Influenza Vaccine (Quad) PF 12/1/2016, 11/5/2015 Influenza Vaccine PF 11/16/2017, 11/7/2013 Influenza Vaccine Split 9/26/2012, 12/14/2011, 10/26/2009 PPD 9/26/2012 Pneumococcal Polysaccharide (PPSV-23) 8/12/2013 TB Skin Test (PPD) Intradermal  Incomplete, 11/10/2015, 10/2/2014, 9/16/2013, 8/12/2013 Tdap 8/12/2013 Not reviewed this visit You Were Diagnosed With   
  
 Codes Comments  Routine general medical examination at a health care facility    - Primary ICD-10-CM: Z00.00 ICD-9-CM: V70.0 Diabetes mellitus without complication ([facility]     GBL-03-LI: E11.9 ICD-9-CM: 250.00 Encounter for immunization     ICD-10-CM: E53 ICD-9-CM: V03.89 Screening examination for pulmonary tuberculosis     ICD-10-CM: Z11.1 ICD-9-CM: V74.1   
 HTN, goal below 130/80     ICD-10-CM: I10 
ICD-9-CM: 401.9 Seizure ([facility].)     ICD-10-CM: R56.9 ICD-9-CM: 780.39 Prediabetes     ICD-10-CM: R73.03 
ICD-9-CM: 790.29 Routine medical exam     ICD-10-CM: Z00.00 ICD-9-CM: V70.0 Mixed hyperlipidemia     ICD-10-CM: E78.2 ICD-9-CM: 272.2 Vitals BP Pulse Temp Resp 111/86 (BP 1 Location: Left arm, BP Patient Position: At rest) 75 95.7 °F (35.4 °C) (Axillary) 14 Height(growth percentile) Weight(growth percentile) BMI Smoking Status 5' 4\" (1.626 m) 130 lb 12.8 oz (59.3 kg) 22.45 kg/m2 Never Smoker BMI and BSA Data Body Mass Index Body Surface Area  
 22.45 kg/m 2 1.64 m 2 Preferred Pharmacy Pharmacy Name Phone [facility] [address] [phone] Your Updated Medication List  
  
   
This list is accurate as of 3/28/18  2:58 PM.  Always use your most recent med list.  
  
  
  
  
 albuterol 90 mcg/actuation inhaler Commonly known as:  PROVENTIL HFA, VENTOLIN HFA, PROAIR HFA Take 1 puff by inhalation every four (4) hours as needed for Wheezing. cetirizine 10 mg tablet Commonly known as:  ZYRTEC Take 1 Tab by mouth daily. For allergies  
  
 chlorhexidine 0.12 % solution Commonly known as:  PERIDEX 15 mL by Swish and Spit route two (2) times a day. ciclopirox 8 % solution Commonly known as:  PENLAC  
APPLY TO AFFECTED AREA TWICE DAILY EXTERNALLY AS DIRECTED.  
  
 cloNIDine HCl 0.1 mg tablet Commonly known as:  CATAPRES  
TAKE ONE TABLET BY MOUTH AT BEDTIME  
  
 DOC-Q-LACE 100 mg capsule Generic drug:  docusate sodium Take 100 mg by mouth two (2) times a day. fluticasone 50 mcg/actuation nasal spray Commonly known as:  Conway Goody 2 Sprays by Both Nostrils route daily. LASTACAFT 0.25 % Drop Generic drug:  alcaftadine Apply  to eye.  
  
 lisinopril-hydroCHLOROthiazide 20-25 mg per tablet Commonly known as:  PRINZIDE, ZESTORETIC  
TAKE 1 TABLET BY MOUTH DAILY  
  
 nebivolol 10 mg tablet Commonly known as:  BYSTOLIC  
TAKE 1 TABLET BY MOUTH TWICE A DAY  
  
 OCUSOFT EYELID CLEANSING PADS Pads Generic drug:  [employer]  
by Does Not Apply route. omeprazole 40 mg capsule Commonly known as:  PRILOSEC Take 40 mg by mouth daily. OTHER Pardeep's baby shampoo. use as lid scrub to each eye once daily  
  
 polyethylene glycol 17 gram/dose powder Commonly known as:  Jodie Martinez MIX 1 CAPFUL IN GLASS OF WATER & DRINK Once DAILY as needed FOR CONSTIPATION, no BM for 48 hrs and then hold for loose bowel movement  
  
 potassium chloride SR 10 mEq tablet Commonly known as:  KLOR-CON 10  
TAKE 1 TABLET BY MOUTH DAILY We Performed the Following AMB POC HEMOGLOBIN A1C [20268 CPT(R)] AMB POC TUBERCULOSIS, INTRADERMAL (SKIN TEST) [58462 CPT(R)] CBC W/O DIFF [84181 CPT(R)] CHOLESTEROL, TOTAL [97685 CPT(R)] FUNDUS PHOTOGRAPHY [id number] CPT(R)] HDL CHOLESTEROL [13091 CPT(R)] METABOLIC PANEL, COMPREHENSIVE [81570 CPT(R)] REFERRAL TO PODIATRY [REF90 Custom] Comments:  
 Please evaluate patient for DM. TSH 3RD GENERATION [94015 CPT(R)] Follow-up Instructions Return in 2 days (on 3/30/2018), or if symptoms worsen or fail to improve. Referral Information Referral ID Referred By Referred To  
  
 [id number] Carlos WRIGHT [facility], [address] [place] [address] Phone: [phone] Visits Status Start Date End Date 1 New Request 3/28/18 3/28/19 If your referral has a status of pending review or denied, additional information will be sent to support the outcome of this decision. Patient Instructions Well Visit, Ages 25 to 48: Care Instructions Your Care Instructions Physical exams can help you stay healthy. Your doctor has checked your overall health and may have suggested ways to take good care of yourself. He or she also may have recommended tests. At home, you can help prevent illness with healthy eating, regular exercise, and other steps. Follow-up care is a key part of your treatment and safety. Be sure to make and go to all appointments, and call your doctor if you are having problems. It's also a good idea to know your test results and keep a list of the medicines you take. How can you care for yourself at home? · Reach and stay at a healthy weight. This will lower your risk for many problems, such as obesity, diabetes, heart disease, and high blood pressure. · Get at least 30 minutes of physical activity on most days of the week. Walking is a good choice. You also may want to do other activities, such as running, swimming, cycling, or playing tennis or team sports. Discuss any changes in your exercise program with your doctor. · Do not smoke or allow others to smoke around you. If you need help quitting, talk to your doctor about stop-smoking programs and medicines. These can increase your chances of quitting for good. · Talk to your doctor about whether you have any risk factors for sexually transmitted infections (STIs). Having one sex partner (who does not have STIs and does not have sex with anyone else) is a good way to avoid these infections. · Use birth control if you do not want to have children at this time. Talk with your doctor about the choices available and what might be best for you. · Protect your skin from too much sun. When you're outdoors from 10 a.m. to 4 p.m., stay in the shade or cover up with clothing and a hat with a wide brim. Wear sunglasses that block UV rays. Even when it's cloudy, put broad-spectrum sunscreen (SPF 30 or higher) on any exposed skin. · See a dentist one or two times a year for checkups and to have your teeth cleaned. · Wear a seat belt in the car. · Drink alcohol in moderation, if at all. That means no more than 2 drinks a day for men and 1 drink a day for women. Follow your doctor's advice about when to have certain tests. These tests can spot problems early. For everyone · Cholesterol. Have the fat (cholesterol) in your blood tested after age 21. Your doctor will tell you how often to have this done based on your age, family history, or other things that can increase your risk for heart disease. · Blood pressure. Have your blood pressure checked during a routine doctor visit. Your doctor will tell you how often to check your blood pressure based on your age, your blood pressure results, and other factors. · Vision. Talk with your doctor about how often to have a glaucoma test. 
· Diabetes. Ask your doctor whether you should have tests for diabetes. · Colon cancer. Have a test for colon cancer at age 48. You may have one of several tests. If you are younger than 48, you may need a test earlier if you have any risk factors. Risk factors include whether you already had a precancerous polyp removed from your colon or whether your parent, brother, sister, or child has had colon cancer. For women · Breast exam and mammogram. Talk to your doctor about when you should have a clinical breast exam and a mammogram. Medical experts differ on whether and how often women under 50 should have these tests. Your doctor can help you decide what is right for you. · Pap test and pelvic exam. Begin Pap tests at age 24. A Pap test is the best way to find cervical cancer.  The test often is part of a pelvic exam. Ask how often to have this test. 
 · Tests for sexually transmitted infections (STIs). Ask whether you should have tests for STIs. You may be at risk if you have sex with more than one person, especially if your partners do not wear condoms. For men · Tests for sexually transmitted infections (STIs). Ask whether you should have tests for STIs. You may be at risk if you have sex with more than one person, especially if you do not wear a condom. · Testicular cancer exam. Ask your doctor whether you should check your testicles regularly. · Prostate exam. Talk to your doctor about whether you should have a blood test (called a PSA test) for prostate cancer. Experts differ on whether and when men should have this test. Some experts suggest it if you are older than 39 and are -American or have a father or brother who got prostate cancer when he was younger than 72. When should you call for help? Watch closely for changes in your health, and be sure to contact your doctor if you have any problems or symptoms that concern you. Where can you learn more? Go to http://marie-angi.info/. Enter P072 in the search box to learn more about \"Well Visit, Ages 25 to 48: Care Instructions. \" Current as of: May 12, 2017 Content Version: 11.4 © 8610-6743 Healthwise, [place]. Care instructions adapted under license by [employer] (which disclaims liability or warranty for this information). If you have questions about a medical condition or this instruction, always ask your healthcare professional. [place] [address] any warranty or liability for your use of this information. High Cholesterol: Care Instructions Your Care Instructions Cholesterol is a type of fat in your blood. It is needed for many body functions, such as making new cells. Cholesterol is made by your body. It also comes from food you eat.  High cholesterol means that you have too much of the fat in your blood. This raises your risk of a heart attack and stroke. LDL and HDL are part of your total cholesterol. LDL is the \"bad\" cholesterol. High LDL can raise your risk for heart disease, heart attack, and stroke. HDL is the \"good\" cholesterol. It helps clear bad cholesterol from the body. High HDL is linked with a lower risk of heart disease, heart attack, and stroke. Your cholesterol levels help your doctor find out your risk for having a heart attack or stroke. You and your doctor can talk about whether you need to lower your risk and what treatment is best for you. A heart-healthy lifestyle along with medicines can help lower your cholesterol and your risk. The way you choose to lower your risk will depend on how high your risk is for heart attack and stroke. It will also depend on how you feel about taking medicines. Follow-up care is a key part of your treatment and safety. Be sure to make and go to all appointments, and call your doctor if you are having problems. It's also a good idea to know your test results and keep a list of the medicines you take. How can you care for yourself at home? · Eat a variety of foods every day. Good choices include fruits, vegetables, whole grains (like oatmeal), dried beans and peas, nuts and seeds, soy products (like tofu), and fat-free or low-fat dairy products. · Replace butter, margarine, and hydrogenated or partially hydrogenated oils with olive and canola oils. (Canola oil margarine without trans fat is fine.) · Replace red meat with fish, poultry, and soy protein (like tofu). · Limit processed and packaged foods like chips, crackers, and cookies. · Bake, broil, or steam foods. Don't mcdaniels them. · Be physically active. Get at least 30 minutes of exercise on most days of the week. Walking is a good choice. You also may want to do other activities, such as running, swimming, cycling, or playing tennis or team sports. · Stay at a healthy weight or lose weight by making the changes in eating and physical activity listed above. Losing just a small amount of weight, even 5 to 10 pounds, can reduce your risk for having a heart attack or stroke. · Do not smoke. When should you call for help? Watch closely for changes in your health, and be sure to contact your doctor if: 
? · You need help making lifestyle changes. ? · You have questions about your medicine. Where can you learn more? Go to http://marie-angi.info/. Enter I721 in the search box to learn more about \"High Cholesterol: Care Instructions. \" Current as of: September 21, 2016 Content Version: 11.4 © 8894-7645 [employer]. Care instructions adapted under license by [employer] (which disclaims liability or warranty for this information). If you have questions about a medical condition or this instruction, always ask your healthcare professional. [address] any warranty or liability for your use of this information. Statins: Care Instructions Your Care Instructions Statins are medicines that lower your cholesterol and your risk for a heart attack and stroke. Cholesterol is a type of fat in your blood. If you have too much cholesterol, it can build up in blood vessels. This raises your risk of heart disease, heart attack, and stroke. Statins lower cholesterol by blocking how much your body makes. This prevents cholesterol from building up in your blood vessels. This is called hardening of the arteries. It is the starting point for some heart and blood flow problems, such as heart disease. Statins may also reduce inflammation around the buildup (called plaque). This can lower the risk that the plaque will break apart and lead to a heart attack or stroke.  
A heart-healthy lifestyle is important for lowering your risk whether you take statins or not. This includes eating healthy foods, being active, staying at a healthy weight, and not smoking. You must take statins regularly for them to work well. If you stop, your cholesterol and your risk will go back up. Examples of statins include: · Atorvastatin (Lipitor). · Lovastatin (Mevacor). · Pravastatin (Pravachol). · Simvastatin (Zocor). Statins interact with many medicines. So tell your doctor all of the other medicines that you take. These include prescription medicines, over-the-counter medicines, dietary supplements, and herbal products. Follow-up care is a key part of your treatment and safety. Be sure to make and go to all appointments, and call your doctor if you are having problems. It's also a good idea to know your test results and keep a list of the medicines you take. How can you care for yourself at home? · Take statins exactly as your doctor tells you. High cholesterol has no symptoms. So it is easy to forget to take the pills. Try to make a system that reminds you to take them. · Do not take two or more medicines at the same time unless the doctor told you to. Statins can interact with other medicines. · Always tell your doctor if you think you are having a side effect. If side effects are a problem with one medicine, a different one may be used. · Keep making the lifestyle changes your doctor suggests. Eat heart-healthy foods, be active, don't smoke, and stay at a healthy weight. · Talk to your doctor about avoiding grapefruit juice if you take statins. Grapefruit juice can raise the level of this medicine in your blood. This could increase side effects. When should you call for help? Watch closely for changes in your health, and be sure to contact your doctor if: 
? · You think you are having problems with your medicine. ? · You have aches or muscle pain. Where can you learn more? Go to http://marie-angi.info/. Enter R358 in the search box to learn more about \"Statins: Care Instructions. \" Current as of: September 21, 2016 Content Version: 11.4 © 2854-8667 [employer]. Care instructions adapted under license by [employer] (which disclaims liability or warranty for this information). If you have questions about a medical condition or this instruction, always ask your healthcare professional. [address] any warranty or liability for your use of this information. Introducing [facility]! Dear Nida Caballero: 
Thank you for requesting a Revee account. Our records indicate that you have previously registered for a Revee account but its currently inactive. Please call our Revee support line at [phone]. Additional Information If you have questions, please visit the Frequently Asked Questions section of the Revee website at [email or web address] [email or web address] Remember, Revee is NOT to be used for urgent needs. For medical emergencies, dial 911. Now available from your iPhone and Android! Please provide this summary of care documentation to your next provider. Your primary care clinician is listed as Yaakov Ricketts. If you have any questions after today's visit, please call [phone].

## 2018-03-28 NOTE — PATIENT INSTRUCTIONS
Well Visit, Ages 25 to 48: Care Instructions  Your Care Instructions    Physical exams can help you stay healthy. Your doctor has checked your overall health and may have suggested ways to take good care of yourself. He or she also may have recommended tests. At home, you can help prevent illness with healthy eating, regular exercise, and other steps. Follow-up care is a key part of your treatment and safety. Be sure to make and go to all appointments, and call your doctor if you are having problems. It's also a good idea to know your test results and keep a list of the medicines you take. How can you care for yourself at home? · Reach and stay at a healthy weight. This will lower your risk for many problems, such as obesity, diabetes, heart disease, and high blood pressure. · Get at least 30 minutes of physical activity on most days of the week. Walking is a good choice. You also may want to do other activities, such as running, swimming, cycling, or playing tennis or team sports. Discuss any changes in your exercise program with your doctor. · Do not smoke or allow others to smoke around you. If you need help quitting, talk to your doctor about stop-smoking programs and medicines. These can increase your chances of quitting for good. · Talk to your doctor about whether you have any risk factors for sexually transmitted infections (STIs). Having one sex partner (who does not have STIs and does not have sex with anyone else) is a good way to avoid these infections. · Use birth control if you do not want to have children at this time. Talk with your doctor about the choices available and what might be best for you. · Protect your skin from too much sun. When you're outdoors from 10 a.m. to 4 p.m., stay in the shade or cover up with clothing and a hat with a wide brim. Wear sunglasses that block UV rays. Even when it's cloudy, put broad-spectrum sunscreen (SPF 30 or higher) on any exposed skin.   · See a dentist one or two times a year for checkups and to have your teeth cleaned. · Wear a seat belt in the car. · Drink alcohol in moderation, if at all. That means no more than 2 drinks a day for men and 1 drink a day for women. Follow your doctor's advice about when to have certain tests. These tests can spot problems early. For everyone  · Cholesterol. Have the fat (cholesterol) in your blood tested after age 21. Your doctor will tell you how often to have this done based on your age, family history, or other things that can increase your risk for heart disease. · Blood pressure. Have your blood pressure checked during a routine doctor visit. Your doctor will tell you how often to check your blood pressure based on your age, your blood pressure results, and other factors. · Vision. Talk with your doctor about how often to have a glaucoma test.  · Diabetes. Ask your doctor whether you should have tests for diabetes. · Colon cancer. Have a test for colon cancer at age 48. You may have one of several tests. If you are younger than 48, you may need a test earlier if you have any risk factors. Risk factors include whether you already had a precancerous polyp removed from your colon or whether your parent, brother, sister, or child has had colon cancer. For women  · Breast exam and mammogram. Talk to your doctor about when you should have a clinical breast exam and a mammogram. Medical experts differ on whether and how often women under 50 should have these tests. Your doctor can help you decide what is right for you. · Pap test and pelvic exam. Begin Pap tests at age 24. A Pap test is the best way to find cervical cancer. The test often is part of a pelvic exam. Ask how often to have this test.  · Tests for sexually transmitted infections (STIs). Ask whether you should have tests for STIs. You may be at risk if you have sex with more than one person, especially if your partners do not wear condoms.   For men  · Tests for sexually transmitted infections (STIs). Ask whether you should have tests for STIs. You may be at risk if you have sex with more than one person, especially if you do not wear a condom. · Testicular cancer exam. Ask your doctor whether you should check your testicles regularly. · Prostate exam. Talk to your doctor about whether you should have a blood test (called a PSA test) for prostate cancer. Experts differ on whether and when men should have this test. Some experts suggest it if you are older than 39 and are -American or have a father or brother who got prostate cancer when he was younger than 72. When should you call for help? Watch closely for changes in your health, and be sure to contact your doctor if you have any problems or symptoms that concern you. Where can you learn more? Go to http://marie-angi.info/. Enter P072 in the search box to learn more about \"Well Visit, Ages 25 to 48: Care Instructions. \"  Current as of: May 12, 2017  Content Version: 11.4  © 2762-6630 [employer]. Care instructions adapted under license by [employer] (which disclaims liability or warranty for this information). If you have questions about a medical condition or this instruction, always ask your healthcare professional. [address] any warranty or liability for your use of this information. High Cholesterol: Care Instructions  Your Care Instructions    Cholesterol is a type of fat in your blood. It is needed for many body functions, such as making new cells. Cholesterol is made by your body. It also comes from food you eat. High cholesterol means that you have too much of the fat in your blood. This raises your risk of a heart attack and stroke. LDL and HDL are part of your total cholesterol. LDL is the \"bad\" cholesterol. High LDL can raise your risk for heart disease, heart attack, and stroke.  HDL is the \"good\" cholesterol. It helps clear bad cholesterol from the body. High HDL is linked with a lower risk of heart disease, heart attack, and stroke. Your cholesterol levels help your doctor find out your risk for having a heart attack or stroke. You and your doctor can talk about whether you need to lower your risk and what treatment is best for you. A heart-healthy lifestyle along with medicines can help lower your cholesterol and your risk. The way you choose to lower your risk will depend on how high your risk is for heart attack and stroke. It will also depend on how you feel about taking medicines. Follow-up care is a key part of your treatment and safety. Be sure to make and go to all appointments, and call your doctor if you are having problems. It's also a good idea to know your test results and keep a list of the medicines you take. How can you care for yourself at home? · Eat a variety of foods every day. Good choices include fruits, vegetables, whole grains (like oatmeal), dried beans and peas, nuts and seeds, soy products (like tofu), and fat-free or low-fat dairy products. · Replace butter, margarine, and hydrogenated or partially hydrogenated oils with olive and canola oils. (Canola oil margarine without trans fat is fine.)  · Replace red meat with fish, poultry, and soy protein (like tofu). · Limit processed and packaged foods like chips, crackers, and cookies. · Bake, broil, or steam foods. Don't mcdaniels them. · Be physically active. Get at least 30 minutes of exercise on most days of the week. Walking is a good choice. You also may want to do other activities, such as running, swimming, cycling, or playing tennis or team sports. · Stay at a healthy weight or lose weight by making the changes in eating and physical activity listed above. Losing just a small amount of weight, even 5 to 10 pounds, can reduce your risk for having a heart attack or stroke. · Do not smoke.   When should you call for help?  Watch closely for changes in your health, and be sure to contact your doctor if:  ? · You need help making lifestyle changes. ? · You have questions about your medicine. Where can you learn more? Go to http://marie-nagi.info/. Enter X666 in the search box to learn more about \"High Cholesterol: Care Instructions. \"  Current as of: September 21, 2016  Content Version: 11.4  © 2454-0665 [employer]. Care instructions adapted under license by [employer] (which disclaims liability or warranty for this information). If you have questions about a medical condition or this instruction, always ask your healthcare professional. [address] any warranty or liability for your use of this information. Statins: Care Instructions  Your Care Instructions    Statins are medicines that lower your cholesterol and your risk for a heart attack and stroke. Cholesterol is a type of fat in your blood. If you have too much cholesterol, it can build up in blood vessels. This raises your risk of heart disease, heart attack, and stroke. Statins lower cholesterol by blocking how much your body makes. This prevents cholesterol from building up in your blood vessels. This is called hardening of the arteries. It is the starting point for some heart and blood flow problems, such as heart disease. Statins may also reduce inflammation around the buildup (called plaque). This can lower the risk that the plaque will break apart and lead to a heart attack or stroke. A heart-healthy lifestyle is important for lowering your risk whether you take statins or not. This includes eating healthy foods, being active, staying at a healthy weight, and not smoking. You must take statins regularly for them to work well. If you stop, your cholesterol and your risk will go back up. Examples of statins include:  · Atorvastatin (Lipitor). · Lovastatin (Mevacor).   · Pravastatin (Pravachol). · Simvastatin (Zocor). Statins interact with many medicines. So tell your doctor all of the other medicines that you take. These include prescription medicines, over-the-counter medicines, dietary supplements, and herbal products. Follow-up care is a key part of your treatment and safety. Be sure to make and go to all appointments, and call your doctor if you are having problems. It's also a good idea to know your test results and keep a list of the medicines you take. How can you care for yourself at home? · Take statins exactly as your doctor tells you. High cholesterol has no symptoms. So it is easy to forget to take the pills. Try to make a system that reminds you to take them. · Do not take two or more medicines at the same time unless the doctor told you to. Statins can interact with other medicines. · Always tell your doctor if you think you are having a side effect. If side effects are a problem with one medicine, a different one may be used. · Keep making the lifestyle changes your doctor suggests. Eat heart-healthy foods, be active, don't smoke, and stay at a healthy weight. · Talk to your doctor about avoiding grapefruit juice if you take statins. Grapefruit juice can raise the level of this medicine in your blood. This could increase side effects. When should you call for help? Watch closely for changes in your health, and be sure to contact your doctor if:  ? · You think you are having problems with your medicine. ? · You have aches or muscle pain. Where can you learn more? Go to http://marie-angi.info/. Enter R358 in the search box to learn more about \"Statins: Care Instructions. \"  Current as of: September 21, 2016  Content Version: 11.4  © 9074-3501 [employer]. Care instructions adapted under license by [employer] (which disclaims liability or warranty for this information).  If you have questions about a medical condition or this instruction, always ask your healthcare professional. [place] [address] any warranty or liability for your use of this information.

## 2018-03-28 NOTE — PROGRESS NOTES
Name and  verified        Chief Complaint   Patient presents with    Well Male    Nasal Congestion     X one week         Health Maintenance reviewed-discussed with patient. 1. Have you been to the ER, urgent care clinic since your last visit? Hospitalized since your last visit? No    2. Have you seen or consulted any other health care providers outside of the [address] [place] [address] since your last visit? Include any pap smears or colon screening.  No

## 2018-03-28 NOTE — PROGRESS NOTES
Subjective:   Maida Zamorano is a 38 y.o. male presenting for his annual checkup. Present for CPE, last Complete Physical exam was yrly in 2014 Oct , He is Up todate w/ all vaccination,he has MR presented with case provider unaware of his old history, no family member present with him at him time,   Last psa exam was never,   last colonoscopy was never,  No past surgical hx,  last bone dexa scan was never,   unaware family hx of prostate cancer   Unaware family hx of colon cancer, parent unknown, not sexaully active , not physically active,  compliant w/ meds, +Rf needed for today for her meds.      Ros is as per the assisted living       ROS:  Feeling well. No dyspnea or chest pain on exertion. No abdominal pain, change in bowel habits, black or bloody stools. No urinary tract or prostatic symptoms. No neurological complaints. Specific concerns today: 3 diff forms needs to be completed for his Assisted living  Current Outpatient Prescriptions   Medication Sig Dispense Refill    fluticasone (FLONASE) 50 mcg/actuation nasal spray 2 Sprays by Both Nostrils route daily.  OTHER Pardeep's baby shampoo. use as lid scrub to each eye once daily      omeprazole (PRILOSEC) 40 mg capsule Take 40 mg by mouth daily.  polyethylene glycol (MIRALAX) 17 gram/dose powder MIX 1 CAPFUL IN GLASS OF WATER & DRINK Once DAILY as needed FOR CONSTIPATION, no BM for 48 hrs and then hold for loose bowel movement 527 g 1    ciclopirox (PENLAC) 8 % solution APPLY TO AFFECTED AREA TWICE DAILY EXTERNALLY AS DIRECTED. 6.6 mL 11    docusate sodium (DOC-Q-LACE) 100 mg capsule Take 100 mg by mouth two (2) times a day.       cloNIDine HCl (CATAPRES) 0.1 mg tablet TAKE ONE TABLET BY MOUTH AT BEDTIME 30 Tab 6    potassium chloride SR (KLOR-CON 10) 10 mEq tablet TAKE 1 TABLET BY MOUTH DAILY 30 Tab 5    lisinopril-hydroCHLOROthiazide (PRINZIDE, ZESTORETIC) 20-25 mg per tablet TAKE 1 TABLET BY MOUTH DAILY 30 Tab 6    cetirizine (ZYRTEC) 10 mg tablet Take 1 Tab by mouth daily. For allergies 30 Tab 12    nebivolol (BYSTOLIC) 10 mg tablet TAKE 1 TABLET BY MOUTH TWICE A DAY 60 Tab 3    M[employer]ellaneous [employer] (OCUSOFT EYELID CLEANSING PADS) pads by Does Not Apply route.  chlorhexidine (PERIDEX) 0.12 % solution 15 mL by Swish and Spit route two (2) times a day.  alcaftadine (LASTACAFT) 0.25 % drop Apply  to eye.  albuterol (PROVENTIL HFA, VENTOLIN HFA, PROAIR HFA) 90 mcg/actuation inhaler Take 1 puff by inhalation every four (4) hours as needed for Wheezing. (Patient not taking: Reported on 3/28/2018) 1 Inhaler 1     Allergies   Allergen Reactions    Zofran [Ondansetron Hcl] Itching     Arm turned red & itched    No Known Allergies Hives     Past Medical History:   Diagnosis Date    Acute pharyngitis 9/29/2014    Bronchitis, acute 12/12/2013    Cellulitis of groin 3/23/2016    Contact dermatitis and other eczema, due to unspecified cause     Decrease in appetite 12/12/2013    Diabetes ([facility] [address]     Dysphagia 10/22/2014    Fall at nursing home 7/24/2017    Fever in adult 9/29/2014    Hypertension     Mental retardation     Prediabetes 10/2/2014    Screening for glaucoma 12/3/2014    Seizure ([facility] [address] 11/27/2017     Past Surgical History:   Procedure Laterality Date    HX HEENT      dental surg.  June 7, 2010     Family History   Problem Relation Age of Onset    Family history unknown: Yes     Social History   Substance Use Topics    Smoking status: Never Smoker    Smokeless tobacco: Never Used    Alcohol use No        Lab Results   Component Value Date/Time    WBC 4.6 07/24/2017 01:13 PM    HGB 12.2 (L) 07/24/2017 01:13 PM    HCT 35.3 (L) 07/24/2017 01:13 PM    PLATELET 107 26/84/8651 01:13 PM    MCV 89 07/24/2017 01:13 PM     Lab Results   Component Value Date/Time    Hemoglobin A1c 6.0 (H) 07/24/2017 01:13 PM    Hemoglobin A1c 6.0 (H) 12/01/2016 10:27 AM    Hemoglobin A1c 6.1 (H) 02/24/2014 04:08 PM    Glucose 93 07/24/2017 01:13 PM    Microalb/Creat ratio (ug/mg creat.) 7.6 07/25/2017 04:30 PM    LDL, calculated 73 08/12/2013 10:55 AM    Creatinine 0.83 07/24/2017 01:13 PM      Lab Results   Component Value Date/Time    Cholesterol, total 144 11/10/2015 09:24 AM    HDL Cholesterol 45 11/10/2015 09:24 AM    LDL, calculated 73 08/12/2013 10:55 AM    Triglyceride 95 08/12/2013 10:55 AM    CHOL/HDL Ratio 3.6 08/25/2010 12:03 PM        Objective:     Visit Vitals    /86 (BP 1 Location: Left arm, BP Patient Position: At rest)    Pulse 75    Temp 95.7 °F (35.4 °C) (Axillary)    Resp 14    Ht 5' 4\" (1.626 m)    Wt 130 lb 12.8 oz (59.3 kg)    BMI 22.45 kg/m2     The patient appears well, alert, oriented x 3, in no distress. ENT normal.  Neck supple. No adenopathy or thyromegaly. MOHSEN. Lungs are clear, good air entry, no wheezes, rhonchi or rales. S1 and S2 normal, no murmurs, regular rate and rhythm. Abdomen is soft without tenderness, guarding, mass or organomegaly.  exam: no penile lesions or discharge, no testicular masses or tenderness, no hernias. Extremities show no edema, normal peripheral pulses. Neurological is normal without focal findings. Assessment/Plan:   healthy adult male  increase physical activity, follow low fat diet, follow low salt diet, continue present plan, routine labs ordered, call if any problems. Diagnoses and all orders for this visit:    1. Routine general medical examination at a health care facility  -     AMB POC HEMOGLOBIN A1C  -     FUNDUS PHOTOGRAPHY  -     REFERRAL TO PODIATRY  -     AMB POC TUBERCULOSIS, INTRADERMAL (SKIN TEST)  -     CBC W/O DIFF  -     METABOLIC PANEL, COMPREHENSIVE  -     TSH 3RD GENERATION  -     CHOLESTEROL, TOTAL  -     HDL CHOLESTEROL    2.  Diabetes mellitus without complication (HCC)  -     AMB POC HEMOGLOBIN A1C  -     FUNDUS PHOTOGRAPHY  -     REFERRAL TO PODIATRY  -     AMB POC TUBERCULOSIS, INTRADERMAL (SKIN TEST)  -     CBC W/O DIFF  - METABOLIC PANEL, COMPREHENSIVE  -     TSH 3RD GENERATION  -     CHOLESTEROL, TOTAL  -     HDL CHOLESTEROL    3. Encounter for immunization  -     [facility] POC HEMOGLOBIN A1C  -     FUNDUS PHOTOGRAPHY  -     REFERRAL TO PODIATRY  -     [facility] POC TUBERCULOSIS, INTRADERMAL (SKIN TEST)  -     CBC W/O DIFF  -     METABOLIC PANEL, COMPREHENSIVE  -     TSH 3RD GENERATION  -     CHOLESTEROL, TOTAL  -     HDL CHOLESTEROL    4. Screening examination for pulmonary tuberculosis  -     [facility] POC HEMOGLOBIN A1C  -     FUNDUS PHOTOGRAPHY  -     REFERRAL TO PODIATRY  -     [facility] POC TUBERCULOSIS, INTRADERMAL (SKIN TEST)  -     CBC W/O DIFF  -     METABOLIC PANEL, COMPREHENSIVE  -     TSH 3RD GENERATION  -     CHOLESTEROL, TOTAL  -     HDL CHOLESTEROL    5. HTN, goal below 130/80  -     [facility] POC HEMOGLOBIN A1C  -     FUNDUS PHOTOGRAPHY  -     REFERRAL TO PODIATRY  -     [facility] POC TUBERCULOSIS, INTRADERMAL (SKIN TEST)  -     CBC W/O DIFF  -     METABOLIC PANEL, COMPREHENSIVE  -     TSH 3RD GENERATION  -     CHOLESTEROL, TOTAL  -     HDL CHOLESTEROL    6. Seizure (HCC)  -     [facility] POC HEMOGLOBIN A1C  -     FUNDUS PHOTOGRAPHY  -     REFERRAL TO PODIATRY  -     [facility] POC TUBERCULOSIS, INTRADERMAL (SKIN TEST)  -     CBC W/O DIFF  -     METABOLIC PANEL, COMPREHENSIVE  -     TSH 3RD GENERATION  -     CHOLESTEROL, TOTAL  -     HDL CHOLESTEROL    7. Prediabetes  -     [facility] POC HEMOGLOBIN A1C  -     FUNDUS PHOTOGRAPHY  -     REFERRAL TO PODIATRY  -     [facility] POC TUBERCULOSIS, INTRADERMAL (SKIN TEST)  -     CBC W/O DIFF  -     METABOLIC PANEL, COMPREHENSIVE  -     TSH 3RD GENERATION  -     CHOLESTEROL, TOTAL  -     HDL CHOLESTEROL    8. Routine medical exam    9.  Mixed hyperlipidemia

## 2018-03-29 LAB
ALBUMIN SERPL-MCNC: 4.1 G/DL (ref 3.5–5.5)
ALBUMIN/GLOB SERPL: 1.4 {RATIO} (ref 1.2–2.2)
ALP SERPL-CCNC: 51 IU/L (ref 39–117)
ALT SERPL-CCNC: 17 IU/L (ref 0–44)
AST SERPL-CCNC: 10 IU/L (ref 0–40)
BILIRUB SERPL-MCNC: <0.2 MG/DL (ref 0–1.2)
BUN SERPL-MCNC: 16 MG/DL (ref 6–24)
BUN/CREAT SERPL: 22 (ref 9–20)
CALCIUM SERPL-MCNC: 9.1 MG/DL (ref 8.7–10.2)
CHLORIDE SERPL-SCNC: 97 MMOL/L (ref 96–106)
CHOLEST SERPL-MCNC: 160 MG/DL (ref 100–199)
CO2 SERPL-SCNC: 28 MMOL/L (ref 18–29)
CREAT SERPL-MCNC: 0.72 MG/DL (ref 0.76–1.27)
ERYTHROCYTE [DISTWIDTH] IN BLOOD BY AUTOMATED COUNT: 15 % (ref 12.3–15.4)
GFR SERPLBLD CREATININE-BSD FMLA CKD-EPI: 113 ML/MIN/1.73
GFR SERPLBLD CREATININE-BSD FMLA CKD-EPI: 131 ML/MIN/1.73
GLOBULIN SER CALC-MCNC: 3 G/DL (ref 1.5–4.5)
GLUCOSE SERPL-MCNC: 99 MG/DL (ref 65–99)
HCT VFR BLD AUTO: 36.9 % (ref 37.5–51)
HDLC SERPL-MCNC: 40 MG/DL
HGB BLD-MCNC: 12.3 G/DL (ref 13–17.7)
MCH RBC QN AUTO: 30.1 PG (ref 26.6–33)
MCHC RBC AUTO-ENTMCNC: 33.3 G/DL (ref 31.5–35.7)
MCV RBC AUTO: 90 FL (ref 79–97)
PLATELET # BLD AUTO: 144 X10E3/UL (ref 150–379)
POTASSIUM SERPL-SCNC: 3.8 MMOL/L (ref 3.5–5.2)
PROT SERPL-MCNC: 7.1 G/DL (ref 6–8.5)
RBC # BLD AUTO: 4.08 X10E6/UL (ref 4.14–5.8)
SODIUM SERPL-SCNC: 142 MMOL/L (ref 134–144)
TSH SERPL DL<=0.005 MIU/L-ACNC: 2.31 UIU/ML (ref 0.45–4.5)
WBC # BLD AUTO: 5.3 X10E3/UL (ref 3.4–10.8)

## 2018-04-02 DIAGNOSIS — F73 PROFOUND MENTAL RETARDATION IN ADULT: Primary | ICD-10-CM

## 2018-04-11 ENCOUNTER — OFFICE VISIT (OUTPATIENT)
Dept: FAMILY MEDICINE CLINIC | Age: 43
End: 2018-04-11

## 2018-04-11 VITALS
SYSTOLIC BLOOD PRESSURE: 131 MMHG | OXYGEN SATURATION: 95 % | WEIGHT: 132.2 LBS | HEIGHT: 64 IN | BODY MASS INDEX: 22.57 KG/M2 | DIASTOLIC BLOOD PRESSURE: 95 MMHG | HEART RATE: 78 BPM | TEMPERATURE: 97.8 F | RESPIRATION RATE: 18 BRPM

## 2018-04-11 DIAGNOSIS — R45.1 AGITATION REQUIRING SEDATION PROTOCOL: ICD-10-CM

## 2018-04-11 DIAGNOSIS — J31.0 CHRONIC RHINITIS: ICD-10-CM

## 2018-04-11 DIAGNOSIS — R19.09 INGUINAL BULGE: Primary | ICD-10-CM

## 2018-04-11 DIAGNOSIS — K59.1 FUNCTIONAL DIARRHEA: ICD-10-CM

## 2018-04-11 DIAGNOSIS — F98.1 PSYCHOGENIC FECAL INCONTINENCE: ICD-10-CM

## 2018-04-11 DIAGNOSIS — F73 PROFOUND MENTAL RETARDATION IN ADULT: ICD-10-CM

## 2018-04-11 PROBLEM — R15.9 STOOL INCONTINENCE: Status: ACTIVE | Noted: 2018-04-11

## 2018-04-11 RX ORDER — CLONAZEPAM 1 MG/1
TABLET ORAL
Qty: 2 TAB | Refills: 0 | Status: SHIPPED | OUTPATIENT
Start: 2018-04-11 | End: 2018-08-16 | Stop reason: ALTCHOICE

## 2018-04-11 RX ORDER — MONTELUKAST SODIUM 10 MG/1
10 TABLET ORAL DAILY
Qty: 30 TAB | Refills: 6 | Status: SHIPPED | OUTPATIENT
Start: 2018-04-11 | End: 2018-11-12 | Stop reason: SDUPTHER

## 2018-04-11 NOTE — PROGRESS NOTES
Patient was combative, swinging  arms and uncooperative during provider assessment and evaluation of groin during office visit (2) caregivers assist with provider and this nurse.

## 2018-04-11 NOTE — MR AVS SNAPSHOT
[address] [place], [place] [address] [id number] [phone] Patient: Yessi Isaac MRN: [id number] FST:[birth year]366 Visit Information Date & Time Provider Department Dept. Phone Encounter #  
 4/11/2018 12:30 PM Ravindra Guzman MD [facility] OFFICE-ANNEX [phone] [id number] Your Appointments 7/26/2018 11:30 AM  
CONSULT with Mary Kate Cordova NP [address] [facility] ([facility]) Appt Note: 6mo f/up convulsive syncope episode cr  
 [place] [address] [facility] [id number] [phone]  
  
   
 [place] [address] [id number] [address] Upcoming Health Maintenance Date Due  
 EYE EXAM RETINAL OR DILATED Q1 7/9/2016 FOOT EXAM Q1 3/23/2017 MEDICARE YEARLY EXAM 3/14/2018 MICROALBUMIN Q1 7/25/2018 HEMOGLOBIN A1C Q6M 9/28/2018 LIPID PANEL Q1 3/28/2019 DTaP/Tdap/Td series (2 - Td) 8/12/2023 Allergies as of 4/11/2018  Review Complete On: 4/11/2018 By: Ismael Bagley LPN Severity Noted Reaction Type Reactions Zofran [Ondansetron Hcl] High 11/11/2011    Itching Arm turned red & itched No Known Allergies Medium   Hives Current Immunizations  Reviewed on 11/5/2015 Name Date Influenza Vaccine 10/22/2014 Influenza Vaccine (Quad) PF 12/1/2016, 11/5/2015 Influenza Vaccine PF 11/16/2017, 11/7/2013 Influenza Vaccine Split 9/26/2012, 12/14/2011, 10/26/2009 PPD 9/26/2012 Pneumococcal Polysaccharide (PPSV-23) 8/12/2013 TB Skin Test (PPD) Intradermal 3/28/2018, 11/10/2015, 10/2/2014, 9/16/2013, 8/12/2013 Tdap 8/12/2013 Not reviewed this visit You Were Diagnosed With   
  
 Codes Comments Inguinal bulge    -  Primary ICD-10-CM: R19.09 
ICD-9-CM: 789.30 Chronic rhinitis     ICD-10-CM: J31.0 ICD-9-CM: 472.0  Profound mental retardation in adult     ICD-10-CM: F73 
 ICD-9-CM: 318.2 Agitation requiring sedation protocol     ICD-10-CM: R45.1 ICD-9-CM: 307.9 Vitals BP Pulse Temp Resp Height(growth percentile) (!) 131/95 (BP 1 Location: Right arm, BP Patient Position: Sitting) 78 97.8 °F (36.6 °C) (Axillary) 18 5' 4\" (1.626 m) Weight(growth percentile) SpO2 BMI Smoking Status 132 lb 3.2 oz (60 kg) 95% 22.69 kg/m2 Never Smoker BMI and BSA Data Body Mass Index Body Surface Area  
 22.69 kg/m 2 1.65 m 2 Preferred Pharmacy Pharmacy Name Phone [facility], [address] [phone] Your Updated Medication List  
  
   
This list is accurate as of 4/11/18  1:56 PM.  Always use your most recent med list.  
  
  
  
  
 albuterol 90 mcg/actuation inhaler Commonly known as:  PROVENTIL HFA, VENTOLIN HFA, PROAIR HFA Take 1 puff by inhalation every four (4) hours as needed for Wheezing. cetirizine 10 mg tablet Commonly known as:  ZYRTEC Take 1 Tab by mouth daily. For allergies  
  
 chlorhexidine 0.12 % solution Commonly known as:  PERIDEX 15 mL by Swish and Spit route two (2) times a day. ciclopirox 8 % solution Commonly known as:  PENLAC  
APPLY TO AFFECTED AREA TWICE DAILY EXTERNALLY AS DIRECTED. clonazePAM 1 mg tablet Commonly known as:  Shi Mcintyre 30- 60 min before ultrasound of his pelvic area just once  
  
 cloNIDine HCl 0.1 mg tablet Commonly known as:  CATAPRES  
TAKE ONE TABLET BY MOUTH AT BEDTIME  
  
 DOC-Q-LACE 100 mg capsule Generic drug:  docusate sodium Take 100 mg by mouth two (2) times a day. fluticasone 50 mcg/actuation nasal spray Commonly known as:  Truett Dowse 2 Sprays by Both Nostrils route daily. LASTACAFT 0.25 % Drop Generic drug:  alcaftadine Apply  to eye.  
  
 lisinopril-hydroCHLOROthiazide 20-25 mg per tablet Commonly known as:  PRINZIDE, ZESTORETIC  
TAKE 1 TABLET BY MOUTH DAILY montelukast 10 mg tablet Commonly known as:  SINGULAIR Take 1 Tab by mouth daily. Indications: Allergic Rhinitis  
  
 nebivolol 10 mg tablet Commonly known as:  BYSTOLIC  
TAKE 1 TABLET BY MOUTH TWICE A DAY  
  
 OCUSOFT EYELID CLEANSING PADS Pads Generic drug:  Misc[employer]  
by Does Not Apply route. omeprazole 40 mg capsule Commonly known as:  PRILOSEC Take 40 mg by mouth daily. OTHER Pardeep's baby shampoo. use as lid scrub to each eye once daily  
  
 polyethylene glycol 17 gram/dose powder Commonly known as:  Patsy Mule MIX 1 CAPFUL IN GLASS OF WATER & DRINK Once DAILY as needed FOR CONSTIPATION, no BM for 48 hrs and then hold for loose bowel movement  
  
 potassium chloride SR 10 mEq tablet Commonly known as:  KLOR-CON 10  
TAKE 1 TABLET BY MOUTH DAILY Prescriptions Printed Refills  
 clonazePAM (KLONOPIN) 1 mg tablet 0 Si- 60 min before ultrasound of his pelvic area just once Class: Print Prescriptions Sent to Pharmacy Refills  
 montelukast (SINGULAIR) 10 mg tablet 6 Sig: Take 1 Tab by mouth daily. Indications: Allergic Rhinitis Class: Normal  
 Pharmacy: [address] [place] [address] #: [phone] Route: Oral  
  
To-Do List   
 2018 Imaging:  US PELV NON OBS Introducing [facility]! Dear Jaqueline Police: 
Thank you for requesting a [employer] account. Our records indicate that you have previously registered for a [employer] account but its currently inactive. Please call our [employer] support line at [phone]. Additional Information If you have questions, please visit the Frequently Asked Questions section of the [employer] website at [email or web address] Remember, [employer] is NOT to be used for urgent needs. For medical emergencies, dial 911. Now available from your iPhone and Android! Please provide this summary of care documentation to your next provider. Your primary care clinician is listed as Quita Linares. If you have any questions after today's visit, please call [phone].

## 2018-04-11 NOTE — PROGRESS NOTES
HISTORY OF PRESENT ILLNESS  Goran Hinton is a 38 y.o. male. HPI   Patient present with assisted living. Profound mental retardation upper and lower extremity deformity with a complaint of groin discomfort unfortunately patient presented today to assistance from his group home stating that is becoming to be uncooperative and harmful to self,  stating that the present problem has been with him for the last couple days every time has been tried to be evaluated patient become agitated and irritated start to kick and cooperative as per the care provider patient has not had any trouble urinating unfortunately he has incontinence of stool, in addition patient with chronic allergic reaction currently taking Zyrtec is not helping as per the providers with him patient has continuous nasal drainage sneezing patient has been compliant with the medication given but he is not getting better,  Otherwise having loose bowel movement currently wearing a diaper denies any abdominal pain at this time patient currently taking Colace and MiraLAX,   patient has 1 page of information to be completed Otherwise,  patient has no other complaint    Current Outpatient Prescriptions   Medication Sig Dispense Refill    montelukast (SINGULAIR) 10 mg tablet Take 1 Tab by mouth daily. Indications: Allergic Rhinitis 30 Tab 6    clonazePAM (KLONOPIN) 1 mg tablet 30- 60 min before ultrasound of his pelvic area just once 2 Tab 0    fluticasone (FLONASE) 50 mcg/actuation nasal spray 2 Sprays by Both Nostrils route daily.  OTHER Pardeep's baby shampoo. use as lid scrub to each eye once daily      omeprazole (PRILOSEC) 40 mg capsule Take 40 mg by mouth daily.       polyethylene glycol (MIRALAX) 17 gram/dose powder MIX 1 CAPFUL IN GLASS OF WATER & DRINK Once DAILY as needed FOR CONSTIPATION, no BM for 48 hrs and then hold for loose bowel movement 527 g 1    ciclopirox (PENLAC) 8 % solution APPLY TO AFFECTED AREA TWICE DAILY EXTERNALLY AS DIRECTED. 6.6 mL 11    docusate sodium (DOC-Q-LACE) 100 mg capsule Take 100 mg by mouth two (2) times a day.  nebivolol (BYSTOLIC) 10 mg tablet TAKE 1 TABLET BY MOUTH TWICE A DAY 60 Tab 3    cloNIDine HCl (CATAPRES) 0.1 mg tablet TAKE ONE TABLET BY MOUTH AT BEDTIME 30 Tab 6    potassium chloride SR (KLOR-CON 10) 10 mEq tablet TAKE 1 TABLET BY MOUTH DAILY 30 Tab 5    lisinopril-hydroCHLOROthiazide (PRINZIDE, ZESTORETIC) 20-25 mg per tablet TAKE 1 TABLET BY MOUTH DAILY 30 Tab 6    cetirizine (ZYRTEC) 10 mg tablet Take 1 Tab by mouth daily. For allergies 30 Tab 12    Miscellaneous Medical Supply (OCUSOFT EYELID CLEANSING PADS) pads by Does Not Apply route.  chlorhexidine (PERIDEX) 0.12 % solution 15 mL by Swish and Spit route two (2) times a day.  alcaftadine (LASTACAFT) 0.25 % drop Apply  to eye.  albuterol (PROVENTIL HFA, VENTOLIN HFA, PROAIR HFA) 90 mcg/actuation inhaler Take 1 puff by inhalation every four (4) hours as needed for Wheezing. (Patient not taking: Reported on 3/28/2018) 1 Inhaler 1     Allergies   Allergen Reactions    Zofran [Ondansetron Hcl] Itching     Arm turned red & itched    No Known Allergies Hives     Past Medical History:   Diagnosis Date    Acute pharyngitis 9/29/2014    Agitation requiring sedation protocol 4/11/2018    Bronchitis, acute 12/12/2013    Cellulitis of groin 3/23/2016    Chronic rhinitis 4/11/2018    Contact dermatitis and other eczema, due to unspecified cause     Decrease in appetite 12/12/2013    Diabetes ([address]     Dysphagia 10/22/2014    Fall at nursing home 7/24/2017    Fever in adult 9/29/2014    Hypertension     Inguinal bulge 4/11/2018    Mental retardation     Prediabetes 10/2/2014    Screening for glaucoma 12/3/2014    Seizure ([address] 11/27/2017     Past Surgical History:   Procedure Laterality Date    HX HEENT      dental surg. June 7, 2010     Family History   Problem Relation Age of Onset    Family history unknown:  Yes Social History   Substance Use Topics    Smoking status: Never Smoker    Smokeless tobacco: Never Used    Alcohol use No      Lab Results  Component Value Date/Time   WBC 5.3 03/28/2018 03:18 PM   HGB 12.3 (L) 03/28/2018 03:18 PM   HCT 36.9 (L) 03/28/2018 03:18 PM   PLATELET 087 (L) 57/40/6447 03:18 PM   MCV 90 03/28/2018 03:18 PM     Lab Results  Component Value Date/Time   GFR est non- 03/28/2018 03:18 PM   GFR est  03/28/2018 03:18 PM   Creatinine 0.72 (L) 03/28/2018 03:18 PM   BUN 16 03/28/2018 03:18 PM   Sodium 142 03/28/2018 03:18 PM   Potassium 3.8 03/28/2018 03:18 PM   Chloride 97 03/28/2018 03:18 PM   CO2 28 03/28/2018 03:18 PM        Review of Systems   Constitutional: Negative for chills, fever and malaise/fatigue. HENT: Negative for congestion and nosebleeds. Eyes: Negative for blurred vision and pain. Respiratory: Negative for shortness of breath and wheezing. Cardiovascular: Negative for chest pain, palpitations and leg swelling. Gastrointestinal: Positive for diarrhea. Negative for constipation, nausea and vomiting. Genitourinary: Negative for dysuria, flank pain and frequency. Musculoskeletal: Negative for joint pain and myalgias. Skin: Negative for itching and rash. Neurological: Negative for dizziness, loss of consciousness and headaches. Endo/Heme/Allergies: Does not bruise/bleed easily. Psychiatric/Behavioral: Negative for depression. The patient is not nervous/anxious and does not have insomnia. All other systems reviewed and are negative. Physical Exam   Constitutional: He is oriented to person, place, and time. He appears well-developed and well-nourished. HENT:   Head: Normocephalic and atraumatic. Mouth/Throat: No oropharyngeal exudate. Eyes: Conjunctivae and EOM are normal. Pupils are equal, round, and reactive to light. Neck: Normal range of motion. Neck supple. No JVD present. No thyromegaly present.    Cardiovascular: Normal rate, regular rhythm, normal heart sounds and intact distal pulses. Exam reveals no friction rub. No murmur heard. Pulmonary/Chest: Effort normal and breath sounds normal. No respiratory distress. He has no wheezes. He has no rales. Abdominal: Soft. Bowel sounds are normal. He exhibits no distension. There is no tenderness. Genitourinary: Penile tenderness present. Genitourinary Comments: Patient uncooperative today on physical examination unable to perform regardless patient was kept in a stable position without getting harm so that his pelvic area could be examined unfortunately he has right inguinal lump painful to touch his right testicle was not palpable his left testicle was small nontender    Musculoskeletal: He exhibits no edema or tenderness. Lymphadenopathy:     He has no cervical adenopathy. Neurological: He is alert and oriented to person, place, and time. He has normal reflexes. Skin: Skin is warm. No rash noted. No erythema. Psychiatric: He has a normal mood and affect. His behavior is normal.   Nursing note and vitals reviewed. ASSESSMENT and PLAN  Diagnoses and all orders for this visit:    1. Inguinal bulge  -     US PELV NON OBS; Future  -     montelukast (SINGULAIR) 10 mg tablet; Take 1 Tab by mouth daily. Indications: Allergic Rhinitis  -     clonazePAM (KLONOPIN) 1 mg tablet; 30- 60 min before ultrasound of his pelvic area just once    2. Chronic rhinitis  -     US PELV NON OBS; Future  -     montelukast (SINGULAIR) 10 mg tablet; Take 1 Tab by mouth daily. Indications: Allergic Rhinitis  -     clonazePAM (KLONOPIN) 1 mg tablet; 30- 60 min before ultrasound of his pelvic area just once    3. Profound mental retardation in adult  -     US PELV NON OBS; Future  -     montelukast (SINGULAIR) 10 mg tablet; Take 1 Tab by mouth daily. Indications: Allergic Rhinitis  -     clonazePAM (KLONOPIN) 1 mg tablet; 30- 60 min before ultrasound of his pelvic area just once    4.  Agitation requiring sedation protocol  -     US PELV NON OBS; Future  -     montelukast (SINGULAIR) 10 mg tablet; Take 1 Tab by mouth daily. Indications: Allergic Rhinitis  -     clonazePAM (KLONOPIN) 1 mg tablet; 30- 60 min before ultrasound of his pelvic area just once    5. Psychogenic fecal incontinence    6.  Functional diarrhea      Today on physical exam patient has lose bowel movement and smeared the stool all over the  examining chair for which it was cleaned under sterile condition patient diaper was also changed hygiene emphasized at this time we discontinue Colace and MiraLAX to be used only on constipation  Assisted-living providers were told if the patient is cooperative and is not harmful to self or others and if patient cooperated with ultrasound procedure he does not need to be medicated medication can be given only and only if patient become hard for to self or others otherwise await the result for further management

## 2018-04-11 NOTE — PROGRESS NOTES
Nayeli Burton is a 38 y.o. male    Chief Complaint   Patient presents with    Skin Problem     states he has a knot on the right side of penis and is limbing     1. Have you been to the ER, urgent care clinic since your last visit? Hospitalized since your last visit? No    2. Have you seen or consulted any other health care providers outside of the [facility] since your last visit? Include any pap smears or colon screening.  No     Health Maintenance Due   Topic Date Due    EYE EXAM RETINAL OR DILATED Q1  07/09/2016    FOOT EXAM Q1  03/23/2017    MEDICARE YEARLY EXAM  03/14/2018

## 2018-04-17 ENCOUNTER — HOSPITAL ENCOUNTER (OUTPATIENT)
Dept: ULTRASOUND IMAGING | Age: 43
Discharge: HOME | End: 2018-04-17
Attending: FAMILY MEDICINE
Payer: MEDICARE

## 2018-04-17 ENCOUNTER — HOSPITAL ENCOUNTER (EMERGENCY)
Age: 43
Discharge: HOME | End: 2018-04-17
Attending: EMERGENCY MEDICINE
Payer: MEDICARE

## 2018-04-17 VITALS
OXYGEN SATURATION: 99 % | DIASTOLIC BLOOD PRESSURE: 103 MMHG | SYSTOLIC BLOOD PRESSURE: 135 MMHG | RESPIRATION RATE: 16 BRPM | HEART RATE: 85 BPM | TEMPERATURE: 97.7 F

## 2018-04-17 DIAGNOSIS — F73 PROFOUND MENTAL RETARDATION IN ADULT: ICD-10-CM

## 2018-04-17 DIAGNOSIS — J31.0 CHRONIC RHINITIS: ICD-10-CM

## 2018-04-17 DIAGNOSIS — R45.1 AGITATION REQUIRING SEDATION PROTOCOL: ICD-10-CM

## 2018-04-17 DIAGNOSIS — R19.09 INGUINAL BULGE: ICD-10-CM

## 2018-04-17 DIAGNOSIS — N44.00 RIGHT TESTICULAR TORSION: Primary | ICD-10-CM

## 2018-04-17 LAB
ALBUMIN SERPL-MCNC: 3.3 G/DL (ref 3.5–5)
ALBUMIN/GLOB SERPL: 0.7 {RATIO} (ref 1.1–2.2)
ALP SERPL-CCNC: 52 U/L (ref 45–117)
ALT SERPL-CCNC: 23 U/L (ref 12–78)
ANION GAP SERPL CALC-SCNC: 5 MMOL/L (ref 5–15)
APPEARANCE UR: CLEAR
APTT PPP: 23.6 SEC (ref 22.1–32)
AST SERPL-CCNC: 17 U/L (ref 15–37)
BACTERIA URNS QL MICRO: NEGATIVE /HPF
BASOPHILS # BLD: 0 K/UL (ref 0–0.1)
BASOPHILS NFR BLD: 0 % (ref 0–1)
BILIRUB SERPL-MCNC: <0.1 MG/DL (ref 0.2–1)
BILIRUB UR QL: NEGATIVE
BUN SERPL-MCNC: 21 MG/DL (ref 6–20)
BUN/CREAT SERPL: 19 (ref 12–20)
CALCIUM SERPL-MCNC: 9.1 MG/DL (ref 8.5–10.1)
CHLORIDE SERPL-SCNC: 102 MMOL/L (ref 97–108)
CO2 SERPL-SCNC: 32 MMOL/L (ref 21–32)
COLOR UR: ABNORMAL
CREAT SERPL-MCNC: 1.1 MG/DL (ref 0.7–1.3)
DIFFERENTIAL METHOD BLD: ABNORMAL
EOSINOPHIL # BLD: 0.1 K/UL (ref 0–0.4)
EOSINOPHIL NFR BLD: 2 % (ref 0–7)
EPITH CASTS URNS QL MICRO: ABNORMAL /LPF
ERYTHROCYTE [DISTWIDTH] IN BLOOD BY AUTOMATED COUNT: 12.8 % (ref 11.5–14.5)
GLOBULIN SER CALC-MCNC: 4.5 G/DL (ref 2–4)
GLUCOSE SERPL-MCNC: 113 MG/DL (ref 65–100)
GLUCOSE UR STRIP.AUTO-MCNC: NEGATIVE MG/DL
HCT VFR BLD AUTO: 34.2 % (ref 36.6–50.3)
HGB BLD-MCNC: 11.3 G/DL (ref 12.1–17)
HGB UR QL STRIP: ABNORMAL
IMM GRANULOCYTES # BLD: 0 K/UL (ref 0–0.04)
IMM GRANULOCYTES NFR BLD AUTO: 0 % (ref 0–0.5)
INR PPP: 1 (ref 0.9–1.1)
KETONES UR QL STRIP.AUTO: NEGATIVE MG/DL
LACTATE SERPL-SCNC: 1.1 MMOL/L (ref 0.4–2)
LEUKOCYTE ESTERASE UR QL STRIP.AUTO: NEGATIVE
LYMPHOCYTES # BLD: 1.7 K/UL (ref 0.8–3.5)
LYMPHOCYTES NFR BLD: 25 % (ref 12–49)
MCH RBC QN AUTO: 31.3 PG (ref 26–34)
MCHC RBC AUTO-ENTMCNC: 33 G/DL (ref 30–36.5)
MCV RBC AUTO: 94.7 FL (ref 80–99)
MONOCYTES # BLD: 0.5 K/UL (ref 0–1)
MONOCYTES NFR BLD: 8 % (ref 5–13)
NEUTS SEG # BLD: 4.3 K/UL (ref 1.8–8)
NEUTS SEG NFR BLD: 65 % (ref 32–75)
NITRITE UR QL STRIP.AUTO: NEGATIVE
NRBC # BLD: 0 K/UL (ref 0–0.01)
NRBC BLD-RTO: 0 PER 100 WBC
PH UR STRIP: 7 [PH] (ref 5–8)
PLATELET # BLD AUTO: 161 K/UL (ref 150–400)
PMV BLD AUTO: 9.7 FL (ref 8.9–12.9)
POTASSIUM SERPL-SCNC: 3.6 MMOL/L (ref 3.5–5.1)
PROT SERPL-MCNC: 7.8 G/DL (ref 6.4–8.2)
PROT UR STRIP-MCNC: NEGATIVE MG/DL
PROTHROMBIN TIME: 10.5 SEC (ref 9–11.1)
RBC # BLD AUTO: 3.61 M/UL (ref 4.1–5.7)
RBC #/AREA URNS HPF: ABNORMAL /HPF (ref 0–5)
SODIUM SERPL-SCNC: 139 MMOL/L (ref 136–145)
SP GR UR REFRACTOMETRY: 1.01 (ref 1–1.03)
THERAPEUTIC RANGE,PTTT: NORMAL SECS (ref 58–77)
UA: UC IF INDICATED,UAUC: ABNORMAL
UROBILINOGEN UR QL STRIP.AUTO: 0.2 EU/DL (ref 0.2–1)
WBC # BLD AUTO: 6.6 K/UL (ref 4.1–11.1)
WBC URNS QL MICRO: ABNORMAL /HPF (ref 0–4)

## 2018-04-17 PROCEDURE — 83605 ASSAY OF LACTIC ACID: CPT | Performed by: EMERGENCY MEDICINE

## 2018-04-17 PROCEDURE — 76857 US EXAM PELVIC LIMITED: CPT

## 2018-04-17 PROCEDURE — 99284 EMERGENCY DEPT VISIT MOD MDM: CPT

## 2018-04-17 PROCEDURE — 85025 COMPLETE CBC W/AUTO DIFF WBC: CPT | Performed by: EMERGENCY MEDICINE

## 2018-04-17 PROCEDURE — 77030005563 HC CATH URETH INT MMGH -A

## 2018-04-17 PROCEDURE — 85730 THROMBOPLASTIN TIME PARTIAL: CPT | Performed by: EMERGENCY MEDICINE

## 2018-04-17 PROCEDURE — 80053 COMPREHEN METABOLIC PANEL: CPT | Performed by: EMERGENCY MEDICINE

## 2018-04-17 PROCEDURE — 81001 URINALYSIS AUTO W/SCOPE: CPT | Performed by: EMERGENCY MEDICINE

## 2018-04-17 PROCEDURE — 76870 US EXAM SCROTUM: CPT

## 2018-04-17 PROCEDURE — 36415 COLL VENOUS BLD VENIPUNCTURE: CPT | Performed by: EMERGENCY MEDICINE

## 2018-04-17 PROCEDURE — 85610 PROTHROMBIN TIME: CPT | Performed by: EMERGENCY MEDICINE

## 2018-04-17 PROCEDURE — 51701 INSERT BLADDER CATHETER: CPT

## 2018-04-17 NOTE — ED TRIAGE NOTES
Sent over by [facility] for +Right sided testicular torsion in the right inguinal canal.  +pain since last week when caregiver would give him baths. +right groin swelling.

## 2018-04-17 NOTE — ED PROVIDER NOTES
HPI Comments: 38 y.o. male with past medical history significant for MR< HTN, DM, bronchitis, and seizure who presents from [address] [place] [address],[address] with caretaker with chief complaint of groin swelling. As per the caretaker, the pt has had R sided groin swelling for a week. The pt saw his PCP a week ago and an US was ordered today. US sent to the pt to the ED due to a torsion. The pt uses a diaper. The pt has not had vomiting, changes in urination, changes in appetite, and fever. There are no other acute medical concerns at this time. Social hx: nonsmoker, no EtOH use  PCP: Bruce De Dios MD    Full history, physical exam, and ROS unable to be obtained due to:  MR.    Note written by Narda Hayes, as dictated by Lewis Estrada MD 7:03 PM      The history is provided by the patient. No  was used. Past Medical History:   Diagnosis Date    Acute pharyngitis 9/29/2014    Agitation requiring sedation protocol 4/11/2018    Bronchitis, acute 12/12/2013    Cellulitis of groin 3/23/2016    Chronic rhinitis 4/11/2018    Contact dermatitis and other eczema, due to unspecified cause     Decrease in appetite 12/12/2013    Diabetes ([facility] [address] [address]     Dysphagia 10/22/2014    Fall at nursing home 7/24/2017    Fever in adult 9/29/2014    Hypertension     Inguinal bulge 4/11/2018    Mental retardation     Prediabetes 10/2/2014    Screening for glaucoma 12/3/2014    Seizure ([address] 11/27/2017    Stool incontinence 4/11/2018       Past Surgical History:   Procedure Laterality Date    HX HEENT      dental surg. June 7, 2010         Family History:   Problem Relation Age of Onset    Family history unknown: Yes       Social History     Social History    Marital status: SINGLE     Spouse name: N/A    Number of children: N/A    Years of education: N/A     Occupational History    Not on file.      Social History Main Topics    Smoking status: Never Smoker    Smokeless tobacco: Never Used    Alcohol use No    Drug use: Not on file    Sexual activity: No     Other Topics Concern    Not on file     Social History Narrative         ALLERGIES: Zofran [ondansetron hcl] and No known allergies    Review of Systems   Unable to perform ROS: Other       Vitals:    04/17/18 1857   BP: (!) 142/91   Resp: 16   SpO2: 98%            Physical Exam   Physical Examination: General appearance - alert, baseline MR, nonverbal  Eyes - pupils equal and reactive, extraocular eye movements intact  Neck - supple, no significant adenopathy  Chest - clear to auscultation, no wheezes, rales or rhonchi, symmetric air entry  Heart - normal rate, regular rhythm, normal S1, S2, no murmurs, rubs, clicks or gallops  Abdomen - soft, nontender, nondistended, no masses or organomegaly  Back exam - full range of motion, no tenderness, palpable spasm or pain on motion  Neurological - alert, moving all extremities  Musculoskeletal - no joint tenderness, deformity or swelling  Extremities - peripheral pulses normal, no pedal edema, no clubbing or cyanosis  Skin - normal coloration and turgor, no rashes, no suspicious skin lesions noted  -undescended testes b/l with bulge in right inguinal canal, no overlying erythema or warmth  MDM  Number of Diagnoses or Management Options  Right testicular torsion:      Amount and/or Complexity of Data Reviewed  Clinical lab tests: ordered and reviewed  Tests in the radiology section of CPT®: ordered and reviewed  Decide to obtain previous medical records or to obtain history from someone other than the patient: yes  Obtain history from someone other than the patient: yes (caregiver)  Review and summarize past medical records: yes  Discuss the patient with other providers: yes (urology)  Independent visualization of images, tracings, or specimens: yes    Patient Progress  Patient progress: stable        ED Course       Procedures  CONSULT NOTE:  7:42 PM Marlon Rios MD spoke with Dr. Alesia Crawford, Consult for Urology. Discussed available diagnostic tests and clinical findings. Keep the pt NPO and repeat the US. 10:27 PM   has seen the pt. No intervention needed. Chronic condition. F/U outpatient as needed.

## 2018-04-18 NOTE — ED NOTES
Pt d/c home, IV d/c'd, d/c paperwork given to facility staff/caregivers, pt wheeled to lobby with caregivers, ARPAN BLACK.

## 2018-04-18 NOTE — ED NOTES
Report given to Nohemi Nettles RN. Patient resting comfortably in stretcher. Care givers bedside, call bell within reach.

## 2018-04-18 NOTE — ED NOTES
Pt resting in bed, on monitor, sitters at bedside, pt does not appear to be in any distress at this time, NADN, VSS, WCTM.

## 2018-04-18 NOTE — CONSULTS
Urology Consult    Patient: Richard Nix MRN: [id number]  SSN: [id number]    YOB: 1975  Age: 38 y.o. Sex: male          Date of Consultation:  April 17, 2018  Requesting Physician: Mague Rowland MD  Reason for Consultation:  Possible right testicular torsion         Assessment/Plan:  Right inguinal testis - no blood flow seen on ultrasound (unchanged from 5 years ago) when seen by Dr. Sweta Hernandez.  -no intervention necessary. Inguinal exploration would only lead to orchiectomy which would not help him in the immediate or long term future. Explained to caregivers who seemed to understand the discussion. Recommended contacting us if there are signs of infection (fever, erythema of overlying skin, etc). History of Present Illness:  Patient is a 38 y.o. male who underwent scrotal ultrasound showing no blood flow to right testis. Caregivers noted the \"lump\" in the area after he was shaved for better hygiene. Patient is non verbal and they were wondering whether he was in pain. He was seen by my partner 5/28/2013 for same issue with same ultrasound findings. After discussion with caregivers at the time, decision was made to not intervene. No fevers or signs of other behavioral changes according to caregivers. Past Medical History: Allergies   Allergen Reactions    Zofran [Ondansetron Hcl] Itching     Arm turned red & itched    No Known Allergies Hives      Prior to Admission medications    Medication Sig Start Date End Date Taking? Authorizing Provider   clonazePAM (KLONOPIN) 1 mg tablet 30- 60 min before ultrasound of his pelvic area just once 4/11/18  Yes Del Terry MD   montelukast (SINGULAIR) 10 mg tablet Take 1 Tab by mouth daily. Indications: Allergic Rhinitis 4/11/18   Del Terry MD   fluticasone (FLONASE) 50 mcg/actuation nasal spray 2 Sprays by Both Nostrils route daily. Historical Provider   OTHER Pardeep's baby shampoo.   use as lid scrub to each eye once daily Historical Provider   omeprazole (PRILOSEC) 40 mg capsule Take 40 mg by mouth daily. Historical Provider   ciclopirox (PENLAC) 8 % solution APPLY TO AFFECTED AREA TWICE DAILY EXTERNALLY AS DIRECTED. 12/7/17   Frances Pereyra MD   nebivolol (BYSTOLIC) 10 mg tablet TAKE 1 TABLET BY MOUTH TWICE A DAY 11/16/17   Frances Pereyra MD   cloNIDine HCl (CATAPRES) 0.1 mg tablet TAKE ONE TABLET BY MOUTH AT BEDTIME 11/16/17   Frances Pereyra MD   potassium chloride SR (KLOR-CON 10) 10 mEq tablet TAKE 1 TABLET BY MOUTH DAILY 11/16/17   Frances Pereyra MD   lisinopril-hydroCHLOROthiazide (PRINZIDE, ZESTORETIC) 20-25 mg per tablet TAKE 1 TABLET BY MOUTH DAILY 11/16/17   Frances Pereyra MD   cetirizine (ZYRTEC) 10 mg tablet Take 1 Tab by mouth daily. For allergies 11/16/17   Frances Pereyra MD   Miscellaneous Medical Supply (OCUSOFT EYELID CLEANSING PADS) pads by Does Not Apply route. Historical Provider   chlorhexidine (PERIDEX) 0.12 % solution 15 mL by Swish and Spit route two (2) times a day. Historical Provider   alcaftadine (LASTACAFT) 0.25 % drop Apply  to eye. Historical Provider   albuterol (PROVENTIL HFA, VENTOLIN HFA, PROAIR HFA) 90 mcg/actuation inhaler Take 1 puff by inhalation every four (4) hours as needed for Wheezing. Patient not taking: Reported on 3/28/2018 9/29/14   Frances Pereyra MD      PMHx:  has a past medical history of Acute pharyngitis (9/29/2014); Agitation requiring sedation protocol (4/11/2018); Bronchitis, acute (12/12/2013); Cellulitis of groin (3/23/2016); Chronic rhinitis (4/11/2018); Contact dermatitis and other eczema, due to unspecified cause; Decrease in appetite (12/12/2013); Diabetes ([facility] [address] Dysphagia (10/22/2014); Fall at nursing home (7/24/2017); Fever in adult (9/29/2014); Hypertension; Inguinal bulge (4/11/2018); Mental retardation; Prediabetes (10/2/2014); Screening for glaucoma (12/3/2014); [facility] (11/27/2017); and Stool incontinence (4/11/2018).    PSurgHx:  has a past surgical history that includes hx heent. PSocHx:  reports that he has never smoked. He has never used smokeless tobacco. He reports that he does not drink alcohol. ROS:  Admission ROS by No admitting provider for patient encounter. from 2018 were reviewed with the patient and changes (other than per HPI) include: none. Physical Exam:            Vitals[de-identified]    Temp (24hrs), Av.7 °F (36.5 °C), Min:97.7 °F (36.5 °C), Max:97.7 °F (36.5 °C)   Blood pressure 151/87, pulse 88, temperature 97.7 °F (36.5 °C), resp. rate 16, SpO2 99 %.              I&O's:                  General:    appears nontoxic, non verbal                     Skin:  no clubbing, cyanosis, edema                HEENT:  NCAT, O/P Clear        Throat/Neck:  supple, no LAD                 Chest[de-identified]  respiratory efforts non labored      Heart[de-identified]  Regular rate and rhythm             Abdomen/Flank[de-identified]  no CVAT, non-tender abdomen without masses             [de-identified]  Bladder not palpable, right testis in inguinal location - without mass or tenderness, slightly decreased consistency, left testis is atrophic but descended, no erythema   Lymph nodes:  no Cervical, supraclavicular, and axillary LAD     Lab Results   Component Value Date/Time    WBC 6.6 2018 07:40 PM    HCT 34.2 (L) 2018 07:40 PM    PLATELET 018  07:40 PM    Sodium 139 2018 07:40 PM    Potassium 3.6 2018 07:40 PM    Chloride 102 2018 07:40 PM    CO2 32 2018 07:40 PM    BUN 21 (H) 2018 07:40 PM    Creatinine 1.10 2018 07:40 PM    Glucose 113 (H) 2018 07:40 PM    Calcium 9.1 2018 07:40 PM    INR 1.0 2018 08:34 PM       UA:   Lab Results   Component Value Date/Time    Color YELLOW/STRAW 2018 08:34 PM    Appearance CLEAR 2018 08:34 PM    Specific gravity 1.014 2018 08:34 PM    pH (UA) 7.0 2018 08:34 PM    Protein NEGATIVE  2018 08:34 PM    Glucose NEGATIVE  2018 08:34 PM    Ketone NEGATIVE  04/17/2018 08:34 PM    Bilirubin NEGATIVE  04/17/2018 08:34 PM    Urobilinogen 0.2 04/17/2018 08:34 PM    Nitrites NEGATIVE  04/17/2018 08:34 PM    Leukocyte Esterase NEGATIVE  04/17/2018 08:34 PM    Epithelial cells FEW 04/17/2018 08:34 PM    Bacteria NEGATIVE  04/17/2018 08:34 PM    WBC 0-4 04/17/2018 08:34 PM    RBC 0-5 04/17/2018 08:34 PM           Signed By: Gina Sagastume MD  - April 17, 2018

## 2018-04-18 NOTE — DISCHARGE INSTRUCTIONS
Testicular Pain: Care Instructions  Your Care Instructions    Pain in the testicles can be caused by many things. These include an injury to your testicles, an infection, and testicular torsion. Injuries and genital problems most often happen during sports or recreational activities, at work, or in a fall. Pain caused by an injury usually goes away quickly. There is usually no long-term harm to your testicles. Infections that may cause pain include:  · An infection of the testicles. This is called orchitis. · An abscess in the scrotum or testicles. · Some sexually transmitted infections (STIs). · A swelling of the tube attached to a testicle. This swelling is called epididymitis. It can cause pain and is sometimes caused by an infection. Testicular torsion happens when a testicle twists on the spermatic cord. This cuts off the blood supply to the testicle. This is a serious condition that requires surgery. Follow-up care is a key part of your treatment and safety. Be sure to make and go to all appointments, and call your doctor if you are having problems. It's also a good idea to know your test results and keep a list of the medicines you take. How can you care for yourself at home? · Rest and protect your testicles and groin. Stop, change, or take a break from any activity that may be causing your pain or soreness. · Put ice or a cold pack on the area for 10 to 20 minutes at a time. Put a thin cloth between the ice and your skin. · Wear briefs, not boxers. Briefs help support the injured area. You can use a jock strap if it helps relieve your pain. · If your doctor prescribed antibiotics, take them as directed. Do not stop taking them just because you feel better. You need to take the full course of antibiotics. · Ask your doctor if you can take an over-the-counter pain medicine, such as acetaminophen (Tylenol), ibuprofen (Advil, Motrin), or naproxen (Aleve). Be safe with medicines.  Read and follow all instructions on the label. · If the doctor gave you a prescription medicine for pain, take it as prescribed. When should you call for help? Call your doctor now or seek immediate medical care if:  ? · You have severe or increasing pain. ? · You notice a change in how your testicles look or are positioned in your scrotum. ? · You notice new or worse swelling in your scrotum. ? · You have symptoms of a urinary problem, such as a urinary tract infection. These may include:  ¨ Pain or burning when you urinate. ¨ A frequent need to urinate without being able to pass much urine. ¨ Pain in the flank, which is just below the rib cage and above the waist on either side of the back. ¨ Blood in your urine. ¨ A fever. ? Watch closely for changes in your health, and be sure to contact your doctor if:  ? · You do not get better as expected. Where can you learn more? Go to http://marieLUVHANangi.info/. Enter M413 in the search box to learn more about \"Testicular Pain: Care Instructions. \"  Current as of: May 12, 2017  Content Version: 11.4  © 2464-0452 [employer]. Care instructions adapted under license by [employer] (which disclaims liability or warranty for this information). If you have questions about a medical condition or this instruction, always ask your healthcare professional. [address] any warranty or liability for your use of this information. We hope that we have addressed all of your medical concerns. The examination and treatment you received in the Emergency Department were for an emergent problem and were not intended as complete care. It is important that you follow up with your healthcare provider(s) for ongoing care. If your symptoms worsen or do not improve as expected, and you are unable to reach your usual health care provider(s), you should return to the Emergency Department.       Today's healthcare is undergoing tremendous change, and patient satisfaction surveys are one of the many tools to assess the quality of medical care. You may receive a survey from the [facility] regarding your experience in the Emergency Department. I hope that your experience has been completely positive, particularly the medical care that I provided. As such, please participate in the survey; anything less than excellent does not meet my expectations or intentions. [facility] and [facility] participate in nationally recognized quality of care measures. If your blood pressure is greater than 120/80, as reported below, we urge that you seek medical care to address the potential of high blood pressure, commonly known as hypertension. Hypertension can be hereditary or can be caused by certain medical conditions, pain, stress, or \"white coat syndrome. \"       Please make an appointment with your health care provider(s) for follow up of your Emergency Department visit. VITALS:   Patient Vitals for the past 8 hrs:   Temp Pulse Resp BP SpO2   04/17/18 2220 - 85 16 (!) 135/103 99 %   04/17/18 2020 97.7 °F (36.5 °C) 88 16 151/87 99 %   04/17/18 1857 - - 16 (!) 142/91 98 %          Thank you for allowing us to provide you with medical care today. We realize that you have many choices for your emergency care needs. Please choose us in the future for any continued health care needs. [facility] [place], [address] [place] [address].   Office: [phone]            Recent Results (from the past 24 hour(s))   CBC WITH AUTOMATED DIFF    Collection Time: 04/17/18  7:40 PM   Result Value Ref Range    WBC 6.6 4.1 - 11.1 K/uL    RBC 3.61 (L) 4.10 - 5.70 M/uL    HGB 11.3 (L) 12.1 - 17.0 g/dL    HCT 34.2 (L) 36.6 - 50.3 %    MCV 94.7 80.0 - 99.0 FL    MCH 31.3 26.0 - 34.0 PG    MCHC 33.0 30.0 - 36.5 g/dL    RDW 12.8 11.5 - 14.5 %    PLATELET 568 320 - 400 K/uL    MPV 9.7 8.9 - 12.9 FL    NRBC 0.0 0  WBC    ABSOLUTE NRBC 0.00 0.00 - 0.01 K/uL    NEUTROPHILS 65 32 - 75 %    LYMPHOCYTES 25 12 - 49 %    MONOCYTES 8 5 - 13 %    EOSINOPHILS 2 0 - 7 %    BASOPHILS 0 0 - 1 %    IMMATURE GRANULOCYTES 0 0.0 - 0.5 %    ABS. NEUTROPHILS 4.3 1.8 - 8.0 K/UL    ABS. LYMPHOCYTES 1.7 0.8 - 3.5 K/UL    ABS. MONOCYTES 0.5 0.0 - 1.0 K/UL    ABS. EOSINOPHILS 0.1 0.0 - 0.4 K/UL    ABS. BASOPHILS 0.0 0.0 - 0.1 K/UL    ABS. IMM. GRANS. 0.0 0.00 - 0.04 K/UL    DF AUTOMATED     METABOLIC PANEL, COMPREHENSIVE    Collection Time: 04/17/18  7:40 PM   Result Value Ref Range    Sodium 139 136 - 145 mmol/L    Potassium 3.6 3.5 - 5.1 mmol/L    Chloride 102 97 - 108 mmol/L    CO2 32 21 - 32 mmol/L    Anion gap 5 5 - 15 mmol/L    Glucose 113 (H) 65 - 100 mg/dL    BUN 21 (H) 6 - 20 MG/DL    Creatinine 1.10 0.70 - 1.30 MG/DL    BUN/Creatinine ratio 19 12 - 20      GFR est AA >60 >60 ml/min/1.73m2    GFR est non-AA >60 >60 ml/min/1.73m2    Calcium 9.1 8.5 - 10.1 MG/DL    Bilirubin, total <0.1 (L) 0.2 - 1.0 MG/DL    ALT (SGPT) 23 12 - 78 U/L    AST (SGOT) 17 15 - 37 U/L    Alk.  phosphatase 52 45 - 117 U/L    Protein, total 7.8 6.4 - 8.2 g/dL    Albumin 3.3 (L) 3.5 - 5.0 g/dL    Globulin 4.5 (H) 2.0 - 4.0 g/dL    A-G Ratio 0.7 (L) 1.1 - 2.2     LACTIC ACID    Collection Time: 04/17/18  7:40 PM   Result Value Ref Range    Lactic acid 1.1 0.4 - 2.0 MMOL/L   URINALYSIS W/ REFLEX CULTURE    Collection Time: 04/17/18  8:34 PM   Result Value Ref Range    Color YELLOW/STRAW      Appearance CLEAR CLEAR      Specific gravity 1.014 1.003 - 1.030      pH (UA) 7.0 5.0 - 8.0      Protein NEGATIVE  NEG mg/dL    Glucose NEGATIVE  NEG mg/dL    Ketone NEGATIVE  NEG mg/dL    Bilirubin NEGATIVE  NEG      Blood TRACE (A) NEG      Urobilinogen 0.2 0.2 - 1.0 EU/dL    Nitrites NEGATIVE  NEG      Leukocyte Esterase NEGATIVE  NEG      WBC 0-4 0 - 4 /hpf    RBC 0-5 0 - 5 /hpf    Epithelial cells FEW FEW /lpf Bacteria NEGATIVE  NEG /hpf    UA:UC IF INDICATED CULTURE NOT INDICATED BY UA RESULT CNI     PROTHROMBIN TIME + INR    Collection Time: 04/17/18  8:34 PM   Result Value Ref Range    INR 1.0 0.9 - 1.1      Prothrombin time 10.5 9.0 - 11.1 sec   PTT    Collection Time: 04/17/18  8:34 PM   Result Value Ref Range    aPTT 23.6 22.1 - 32.0 sec    aPTT, therapeutic range     58.0 - 77.0 SECS       Us Scrotum/testicles    Result Date: 4/17/2018  EXAM:  US PELV NON OBS  LTD INDICATION:   Right inguinal lump for 2 weeks. History of right-sided testicular torsion in 2013. COMPARISON: 2013. TECHNIQUE: Real-time sonography of the scrotum was performed with a high frequency linear transducer. Multiple static images were obtained. Color Doppler evaluation was also performed. FINDINGS: The left testicle is normal in size and echotexture with normal color-flow. The right testis measures 4.1 x 2.6 x 1.8 cm and the left testis measures 2.6 x 2 x 0.8 cm. The right testicle demonstrates no flow and is located in the right inguinal canal. No hernia seen. IMPRESSION:  Right sided testicular torsion in the right inguinal canal. The patient and his caregiver were notified and the patient was taken to the ER.  STEPHANE Dunne, was notified in the ER at 1838 hours by myself [phone]

## 2018-06-01 DIAGNOSIS — Z23 ENCOUNTER FOR IMMUNIZATION: ICD-10-CM

## 2018-06-01 DIAGNOSIS — Z00.00 ROUTINE GENERAL MEDICAL EXAMINATION AT A HEALTH CARE FACILITY: ICD-10-CM

## 2018-06-01 DIAGNOSIS — I10 HTN, GOAL BELOW 130/80: ICD-10-CM

## 2018-06-01 DIAGNOSIS — R56.9 SEIZURE (HCC): ICD-10-CM

## 2018-06-01 DIAGNOSIS — Z11.1 SCREENING EXAMINATION FOR PULMONARY TUBERCULOSIS: ICD-10-CM

## 2018-06-01 DIAGNOSIS — R73.03 PREDIABETES: ICD-10-CM

## 2018-06-01 DIAGNOSIS — E11.9 DIABETES MELLITUS WITHOUT COMPLICATION (HCC): ICD-10-CM

## 2018-06-01 RX ORDER — FLUTICASONE PROPIONATE 50 MCG
SPRAY, SUSPENSION (ML) NASAL
Qty: 16 G | Refills: 11 | Status: SHIPPED | OUTPATIENT
Start: 2018-06-01

## 2018-07-11 DIAGNOSIS — R56.9 SEIZURE (HCC): ICD-10-CM

## 2018-07-11 DIAGNOSIS — D69.6 PLATELETS DECREASED (HCC): ICD-10-CM

## 2018-07-11 DIAGNOSIS — Z23 ENCOUNTER FOR IMMUNIZATION: ICD-10-CM

## 2018-07-11 RX ORDER — LISINOPRIL AND HYDROCHLOROTHIAZIDE 20; 25 MG/1; MG/1
TABLET ORAL
Qty: 31 TAB | Status: SHIPPED | OUTPATIENT
Start: 2018-07-11 | End: 2018-12-28 | Stop reason: ALTCHOICE

## 2018-07-25 ENCOUNTER — TELEPHONE (OUTPATIENT)
Dept: NEUROLOGY | Age: 43
End: 2018-07-25

## 2018-07-25 NOTE — TELEPHONE ENCOUNTER
----- Message from Kevin Bradley sent at 7/25/2018  3:21 PM EDT -----  Regarding: Np Tolu/Telephone   Chase Fry, stated that pt has not been having any episode and wanted to know if the visit could be PRN like they talked about. Best contact number is [phone].

## 2018-07-26 ENCOUNTER — OFFICE VISIT (OUTPATIENT)
Dept: FAMILY MEDICINE CLINIC | Age: 43
End: 2018-07-26

## 2018-07-26 VITALS
BODY MASS INDEX: 20.81 KG/M2 | HEIGHT: 64 IN | OXYGEN SATURATION: 96 % | RESPIRATION RATE: 16 BRPM | WEIGHT: 121.91 LBS | DIASTOLIC BLOOD PRESSURE: 86 MMHG | SYSTOLIC BLOOD PRESSURE: 129 MMHG | HEART RATE: 71 BPM | TEMPERATURE: 95.2 F

## 2018-07-26 DIAGNOSIS — L03.314 CELLULITIS OF GROIN: Primary | ICD-10-CM

## 2018-07-26 PROBLEM — L03.90 CELLULITIS: Status: ACTIVE | Noted: 2018-07-26

## 2018-07-26 RX ORDER — AMOXICILLIN AND CLAVULANATE POTASSIUM 400; 57 MG/1; MG/1
1 TABLET, CHEWABLE ORAL 2 TIMES DAILY
Qty: 20 TAB | Refills: 0 | Status: SHIPPED | OUTPATIENT
Start: 2018-07-26 | End: 2018-08-05

## 2018-07-26 RX ORDER — ACETAMINOPHEN 325 MG/1
TABLET ORAL
COMMUNITY

## 2018-07-26 RX ORDER — AMOXICILLIN AND CLAVULANATE POTASSIUM 400; 57 MG/5ML; MG/5ML
POWDER, FOR SUSPENSION ORAL 2 TIMES DAILY
COMMUNITY
End: 2018-08-16 | Stop reason: ALTCHOICE

## 2018-07-26 RX ORDER — POLYETHYLENE GLYCOL 3350 17 G/17G
17 POWDER, FOR SOLUTION ORAL DAILY
COMMUNITY
End: 2018-08-17 | Stop reason: SDUPTHER

## 2018-07-26 RX ORDER — LISINOPRIL 40 MG/1
40 TABLET ORAL DAILY
COMMUNITY
End: 2018-09-17 | Stop reason: SDUPTHER

## 2018-07-26 NOTE — PROGRESS NOTES
HISTORY OF PRESENT ILLNESS  Edgardo Gillis is a 37 y.o. male. HPI   Pt has had been some trouble with skin and presented with care providers and so far as per them pt has been doing well post operatively had a good sized wound with infection, will see the surgeon on 8/1/2018, having nurse visit and dsng change daily currently on oral ABX< was hospitalized for 4 days athe skin lesion was on the rt groin area, his bp also was elevated and his lisinopril also increased, stating that his,  pain is nicely controlled and there was no sign sxs of dvt, infection nor bleeding, he is compliant with his meds and has hd no c, was advised to do ambulation, elevation, call for any seen of bleeding, fever or chills, or any worsening pain or leg swelling including problem with cp, and Sob              Current Outpatient Prescriptions   Medication Sig Dispense Refill    lisinopril-hydroCHLOROthiazide (PRINZIDE, ZESTORETIC) 20-25 mg per tablet TAKE 1 TABLET BY MOUTH DAILY ** OK TO CRUSH AND GIVE MEDS** 31 Tab PRN    DOC-Q-LACE 100 mg capsule TAKE (1) CAPSULE BY MOUTH TWICE DAILY 60 Cap PRN    fluticasone (FLONASE) 50 mcg/actuation nasal spray BLOW NOSE: 2 SPRAYS IN EACH NOSTRIL DAILY FOR ALLERGY. 16 g 11    montelukast (SINGULAIR) 10 mg tablet Take 1 Tab by mouth daily. Indications: Allergic Rhinitis 30 Tab 6    clonazePAM (KLONOPIN) 1 mg tablet 30- 60 min before ultrasound of his pelvic area just once 2 Tab 0    OTHER Pardeep's baby shampoo. use as lid scrub to each eye once daily      omeprazole (PRILOSEC) 40 mg capsule Take 40 mg by mouth daily.  ciclopirox (PENLAC) 8 % solution APPLY TO AFFECTED AREA TWICE DAILY EXTERNALLY AS DIRECTED.  6.6 mL 11    nebivolol (BYSTOLIC) 10 mg tablet TAKE 1 TABLET BY MOUTH TWICE A DAY 60 Tab 3    cloNIDine HCl (CATAPRES) 0.1 mg tablet TAKE ONE TABLET BY MOUTH AT BEDTIME 30 Tab 6    potassium chloride SR (KLOR-CON 10) 10 mEq tablet TAKE 1 TABLET BY MOUTH DAILY 30 Tab 5    cetirizine (ZYRTEC) 10 mg tablet Take 1 Tab by mouth daily. For allergies 30 Tab 12    Miscellaneous Medical Supply (OCUSOFT EYELID CLEANSING PADS) pads by Does Not Apply route.  chlorhexidine (PERIDEX) 0.12 % solution 15 mL by Swish and Spit route two (2) times a day.  alcaftadine (LASTACAFT) 0.25 % drop Apply  to eye.  albuterol (PROVENTIL HFA, VENTOLIN HFA, PROAIR HFA) 90 mcg/actuation inhaler Take 1 puff by inhalation every four (4) hours as needed for Wheezing. (Patient not taking: Reported on 3/28/2018) 1 Inhaler 1     Allergies   Allergen Reactions    Zofran [Ondansetron Hcl] Itching     Arm turned red & itched    No Known Allergies Hives     Past Medical History:   Diagnosis Date    Acute pharyngitis 9/29/2014    Agitation requiring sedation protocol 4/11/2018    Bronchitis, acute 12/12/2013    Cellulitis of groin 3/23/2016    Chronic rhinitis 4/11/2018    Contact dermatitis and other eczema, due to unspecified cause     Decrease in appetite 12/12/2013    [facility] ([facility]     Dysphagia 10/22/2014    Fall at nursing home 7/24/2017    Fever in adult 9/29/2014    Hypertension     Inguinal bulge 4/11/2018    Mental retardation     Prediabetes 10/2/2014    Screening for glaucoma 12/3/2014    Seizure ([facility] 11/27/2017    Stool incontinence 4/11/2018     Past Surgical History:   Procedure Laterality Date    HX HEENT      dental surg.  June 7, 2010     Family History   Problem Relation Age of Onset    Family history unknown: Yes     Social History   Substance Use Topics    Smoking status: Never Smoker    Smokeless tobacco: Never Used    Alcohol use No      Lab Results  Component Value Date/Time   WBC 6.6 04/17/2018 07:40 PM   HGB 11.3 (L) 04/17/2018 07:40 PM   HCT 34.2 (L) 04/17/2018 07:40 PM   PLATELET 331 27/22/5309 07:40 PM   MCV 94.7 04/17/2018 07:40 PM     Lab Results  Component Value Date/Time   TSH 2.310 03/28/2018 03:18 PM         Review of Systems   Unable to perform ROS: Mental acuity   Constitutional: Negative for chills and fever. HENT: Negative for congestion and nosebleeds. Eyes: Negative for blurred vision and pain. Respiratory: Negative for cough, shortness of breath and wheezing. Cardiovascular: Negative for chest pain and leg swelling. Gastrointestinal: Negative for constipation, diarrhea, nausea and vomiting. Genitourinary: Negative for dysuria and frequency. Musculoskeletal: Negative for joint pain and myalgias. Skin: Positive for rash. Negative for itching. Neurological: Negative for dizziness, loss of consciousness and headaches. Psychiatric/Behavioral: Negative for depression. The patient is not nervous/anxious and does not have insomnia. Physical Exam   Constitutional: He is oriented to person, place, and time. He appears well-developed and well-nourished. HENT:   Head: Normocephalic and atraumatic. Mouth/Throat: No oropharyngeal exudate. Eyes: Conjunctivae and EOM are normal. Pupils are equal, round, and reactive to light. Neck: Normal range of motion. Neck supple. No JVD present. No thyromegaly present. Cardiovascular: Normal rate, regular rhythm, normal heart sounds and intact distal pulses. Exam reveals no friction rub. No murmur heard. Pulmonary/Chest: Effort normal and breath sounds normal. No respiratory distress. He has no wheezes. He has no rales. Abdominal: Soft. Bowel sounds are normal. He exhibits no distension. There is no tenderness. Musculoskeletal: He exhibits no edema or tenderness. Lymphadenopathy:     He has no cervical adenopathy. Neurological: He is alert and oriented to person, place, and time. He has normal reflexes. Skin: Skin is warm. No rash noted. There is erythema. Psychiatric: He has a normal mood and affect. His behavior is normal.   Nursing note and vitals reviewed. ASSESSMENT and PLAN  Diagnoses and all orders for this visit:    1.  Cellulitis of groin  -     amoxicillin-clavulanate (AUGMENTIN) 400-57 mg per chewable tablet; Take 1 Tab by mouth two (2) times a day for 10 days.     pt was told to f/u with the wound nurse and the daily ssng change f/u with he surgeon cont hygiene call for any concern rtc in 4 wks,    Will start on Abx high dose for the next 14 days, advise to wash bid with soaps and water, multiple hand washing, medications side effects was addressed, was told to RTC or call if not better

## 2018-07-26 NOTE — TELEPHONE ENCOUNTER
Yes, that is fine. Let me know if it happens again per NP. Contacted the caregiver to let her know of the previous message sent from the NP. She stated she would just let us know if he has any episodes or seizures and at that time if we need to see him then we can make him an appt. She has verbalized understanding and will let us know.

## 2018-07-26 NOTE — PATIENT INSTRUCTIONS
Perineal Abscess: Care Instructions  Your Care Instructions  A perineal abscess is an infection that causes a painful lump in the perineum. The perineum is the area between the scrotum and the anus in a man. In a woman, it's the area between the vulva and the anus. The area may look red and feel painful and be swollen. The abscess may form after surgery or after delivery of a baby. It can also be caused by an infection of the prostate gland. The prostate gland is an organ found inside a man's body, just below the bladder. Your doctor may have done minor surgery to open and drain the abscess. You may have had a sedative to help you relax. You may be unsteady after having sedation. It can take a few hours for the medicine's effects to wear off. Common side effects include nausea, vomiting, and feeling sleepy or tired. The doctor has checked you carefully, but problems can develop later. If you notice any problems or new symptoms, get medical treatment right away. Follow-up care is a key part of your treatment and safety. Be sure to make and go to all appointments, and call your doctor if you are having problems. It's also a good idea to know your test results and keep a list of the medicines you take. How can you care for yourself at home? · If your doctor gave you a sedative:  ¨ For 24 hours, don't do anything that requires attention to detail. It takes time for the medicine's effects to completely wear off. ¨ For your safety, do not drive or operate any machinery that could be dangerous. Wait until the medicine wears off. You need to be able to think clearly and react easily. · Be safe with medicines. Read and follow all instructions on the label. ¨ If the doctor gave you a prescription medicine for pain, take it as prescribed. ¨ If you are not taking a prescription pain medicine, ask your doctor if you can take an over-the-counter medicine.   · If your doctor prescribed antibiotics, take them as directed. Do not stop taking them just because you feel better. You need to take the full course of antibiotics. · Sit in a few inches of warm water (sitz bath) 3 times a day and after bowel movements. The warm water helps the area heal and eases discomfort. When should you call for help? Call 911 anytime you think you may need emergency care. For example, call if:    · You have trouble breathing.     · You passed out (lost consciousness).    Call your doctor now or seek immediate medical care if:    · You have symptoms of infection, such as:  ¨ Increased pain, swelling, warmth, or redness. ¨ Red streaks leading from the area. ¨ Pus draining from the area. ¨ A fever.    Watch closely for changes in your health, and be sure to contact your doctor if:    · You do not get better as expected. Where can you learn more? Go to http://marie-angi.info/. Enter 96 [id number] in the search box to learn more about \"Perineal Abscess: Care Instructions. \"  Current as of: May 12, 2017  Content Version: 11.7  © 9720-8222 [employer]wise[employer]orporated. Care instructions adapted under license by [employer] (which disclaims liability or warranty for this information). If you have questions about a medical condition or this instruction, always ask your healthcare professional. [address] any warranty or liability for your use of this information.

## 2018-08-02 DIAGNOSIS — D69.6 PLATELETS DECREASED (HCC): ICD-10-CM

## 2018-08-02 DIAGNOSIS — R56.9 SEIZURE (HCC): ICD-10-CM

## 2018-08-02 DIAGNOSIS — Z23 ENCOUNTER FOR IMMUNIZATION: ICD-10-CM

## 2018-08-02 RX ORDER — POTASSIUM CHLORIDE 750 MG/1
TABLET, FILM COATED, EXTENDED RELEASE ORAL
Qty: 30 TAB | Refills: 5 | Status: SHIPPED | OUTPATIENT
Start: 2018-08-02 | End: 2019-02-11 | Stop reason: SDUPTHER

## 2018-08-16 ENCOUNTER — OFFICE VISIT (OUTPATIENT)
Dept: FAMILY MEDICINE CLINIC | Age: 43
End: 2018-08-16

## 2018-08-16 VITALS
HEIGHT: 64 IN | OXYGEN SATURATION: 100 % | WEIGHT: 121 LBS | BODY MASS INDEX: 20.66 KG/M2 | TEMPERATURE: 97.7 F | RESPIRATION RATE: 16 BRPM | HEART RATE: 79 BPM

## 2018-08-16 DIAGNOSIS — L03.314 CELLULITIS OF GROIN: Primary | ICD-10-CM

## 2018-08-16 DIAGNOSIS — E87.6 HYPOKALEMIA: ICD-10-CM

## 2018-08-16 NOTE — PROGRESS NOTES
HISTORY OF PRESENT ILLNESS  Christiana Cooper is a 37 y.o. male. HPI   Last visit pt had Cellulitis of groin was put on amoxicillin-clavulanate (AUGMENTIN) 400-57 mg per chewable tablet; Take 1 Tab by mouth two (2) times a day for 10 days, meds was discont by the surgeon pt skin looked great on the reeval, currently has no fever no chills, no leg swelling eating well good and improved appetite, his wound nurse and the daily ssng change has been all stopped,  Was told no need to be seen by the surgeon pt at assisted living and he has a great hygiene ,  With hx of low k pt has has no seizure since last visit    Current Outpatient Prescriptions   Medication Sig Dispense Refill    potassium chloride SR (KLOR-CON 10) 10 mEq tablet TAKE 1 TABLET BY MOUTH DAILY 30 Tab 5    lisinopril (PRINIVIL, ZESTRIL) 40 mg tablet Take 40 mg by mouth daily.  acetaminophen (TYLENOL) 325 mg tablet Take  by mouth every four (4) hours as needed for Pain.  polyethylene glycol (MIRALAX) 17 gram/dose powder Take 17 g by mouth daily.  DOC-Q-LACE 100 mg capsule TAKE (1) CAPSULE BY MOUTH TWICE DAILY 60 Cap PRN    fluticasone (FLONASE) 50 mcg/actuation nasal spray BLOW NOSE: 2 SPRAYS IN EACH NOSTRIL DAILY FOR ALLERGY. 16 g 11    montelukast (SINGULAIR) 10 mg tablet Take 1 Tab by mouth daily. Indications: Allergic Rhinitis 30 Tab 6    OTHER Pardeep's baby shampoo. use as lid scrub to each eye once daily      omeprazole (PRILOSEC) 40 mg capsule Take 40 mg by mouth daily.  ciclopirox (PENLAC) 8 % solution APPLY TO AFFECTED AREA TWICE DAILY EXTERNALLY AS DIRECTED. 6.6 mL 11    nebivolol (BYSTOLIC) 10 mg tablet TAKE 1 TABLET BY MOUTH TWICE A DAY 60 Tab 3    cloNIDine HCl (CATAPRES) 0.1 mg tablet TAKE ONE TABLET BY MOUTH AT BEDTIME 30 Tab 6    cetirizine (ZYRTEC) 10 mg tablet Take 1 Tab by mouth daily. For allergies 30 Tab 12    Miscellaneous Medical Supply (OCUSOFT EYELID CLEANSING PADS) pads by Does Not Apply route.       lisinopril-hydroCHLOROthiazide (PRINZIDE, ZESTORETIC) 20-25 mg per tablet TAKE 1 TABLET BY MOUTH DAILY ** OK TO CRUSH AND GIVE MEDS** 31 Tab PRN    alcaftadine (LASTACAFT) 0.25 % drop Apply  to eye.  albuterol (PROVENTIL HFA, VENTOLIN HFA, PROAIR HFA) 90 mcg/actuation inhaler Take 1 puff by inhalation every four (4) hours as needed for Wheezing. (Patient not taking: Reported on 3/28/2018) 1 Inhaler 1     Allergies   Allergen Reactions    Zofran [Ondansetron Hcl] Itching     Arm turned red & itched    No Known Allergies Hives     Past Medical History:   Diagnosis Date    Acute pharyngitis 9/29/2014    Agitation requiring sedation protocol 4/11/2018    Bronchitis, acute 12/12/2013    Cellulitis 7/26/2018    Cellulitis of groin 3/23/2016    Chronic rhinitis 4/11/2018    Contact dermatitis and other eczema, due to unspecified cause     Decrease in appetite 12/12/2013    Diabetes ([address]     Dysphagia 10/22/2014    Fall at nursing home 7/24/2017    Fever in adult 9/29/2014    Hypertension     Inguinal bulge 4/11/2018    Mental retardation     Prediabetes 10/2/2014    Screening for glaucoma 12/3/2014    Seizure ([address] 11/27/2017    Stool incontinence 4/11/2018     Past Surgical History:   Procedure Laterality Date    HX HEENT      dental surg. June 7, 2010     Family History   Problem Relation Age of Onset    Family history unknown: Yes     Social History   Substance Use Topics    Smoking status: Never Smoker    Smokeless tobacco: Never Used    Alcohol use No      Lab Results  Component Value Date/Time   WBC 6.6 04/17/2018 07:40 PM   HGB 11.3 (L) 04/17/2018 07:40 PM   HCT 34.2 (L) 04/17/2018 07:40 PM   PLATELET 224 60/72/4740 07:40 PM   MCV 94.7 04/17/2018 07:40 PM     Lab Results  Component Value Date/Time   TSH 2.310 03/28/2018 03:18 PM         Review of Systems   Unable to perform ROS: Medical condition       Physical Exam   Constitutional: He is oriented to person, place, and time.  He appears well-developed and well-nourished. HENT:   Head: Normocephalic and atraumatic. Mouth/Throat: No oropharyngeal exudate. Eyes: Conjunctivae and EOM are normal. Pupils are equal, round, and reactive to light. Neck: Normal range of motion. Neck supple. No JVD present. No thyromegaly present. Cardiovascular: Normal rate, regular rhythm, normal heart sounds and intact distal pulses. Exam reveals no friction rub. No murmur heard. Pulmonary/Chest: Effort normal and breath sounds normal. No respiratory distress. He has no wheezes. He has no rales. Abdominal: Soft. Bowel sounds are normal. He exhibits no distension. There is no tenderness. Musculoskeletal: He exhibits no edema or tenderness. Left foot: There is normal range of motion, no tenderness, no bony tenderness, no swelling and normal capillary refill. Nl pulses, nl visual inspection nl monoF   Lymphadenopathy:     He has no cervical adenopathy. Neurological: He is alert and oriented to person, place, and time. He has normal reflexes. Skin: Skin is warm. No rash noted. No erythema. Psychiatric: He has a normal mood and affect. His behavior is normal.   Nursing note and vitals reviewed. ASSESSMENT and PLAN  Diagnoses and all orders for this visit:    1. Cellulitis of groin  -     METABOLIC PANEL, BASIC  -     CBC W/O DIFF    2.  Hypokalemia  -     METABOLIC PANEL, BASIC  -     CBC W/O DIFF      k rich diet advised info given repeat k today for further care

## 2018-08-16 NOTE — MR AVS SNAPSHOT
[address] [facility] [id number] 
[phone] Patient: Vivian Thompson MRN: [id number] SNK:[birth year] Visit Information Date & Time Provider Department Dept. Phone Encounter #  
 8/16/2018  2:30 PM Kenyon Allred MD [facility] OFFICE-[facility] [phone] [id number] Follow-up Instructions Return in about 6 months (around 2/16/2019), or if symptoms worsen or fail to improve. Follow-up and Disposition History Upcoming Health Maintenance Date Due  
 EYE EXAM RETINAL OR DILATED Q1 7/9/2016 FOOT EXAM Q1 3/23/2017 MICROALBUMIN Q1 7/25/2018 Influenza Age 5 to Adult 8/1/2018 HEMOGLOBIN A1C Q6M 9/28/2018 LIPID PANEL Q1 3/28/2019 DTaP/Tdap/Td series (2 - Td) 8/12/2023 Allergies as of 8/16/2018  Review Complete On: 8/16/2018 By: Darrius Fofana LPN Severity Noted Reaction Type Reactions Zofran [Ondansetron Hcl] High 11/11/2011    Itching Arm turned red & itched No Known Allergies Medium   Hives Current Immunizations  Reviewed on 11/5/2015 Name Date Influenza Vaccine 10/22/2014 Influenza Vaccine (Quad) PF 12/1/2016, 11/5/2015 Influenza Vaccine PF 11/16/2017, 11/7/2013 Influenza Vaccine Split 9/26/2012, 12/14/2011, 10/26/2009 PPD 9/26/2012 Pneumococcal Polysaccharide (PPSV-23) 8/12/2013 TB Skin Test (PPD) Intradermal 3/28/2018, 11/10/2015, 10/2/2014, 9/16/2013, 8/12/2013 Tdap 8/12/2013 Not reviewed this visit You Were Diagnosed With   
  
 Codes Comments Cellulitis of groin    -  Primary ICD-10-CM: Y99.935 ICD-9-CM: 682.2 Hypokalemia     ICD-10-CM: E87.6 ICD-9-CM: 276.8 Vitals Pulse Temp Resp Height(growth percentile) Weight(growth percentile) SpO2  
 79 97.7 °F (36.5 °C) (Axillary) 16 5' 4\" (1.626 m) 121 lb (54.9 kg) 100% BMI Smoking Status 20.77 kg/m2 Never Smoker Vitals History BMI and BSA Data Body Mass Index Body Surface Area 20.77 kg/m 2 1.57 m 2 Preferred Pharmacy Pharmacy Name Phone [facility], [address] [phone] Your Updated Medication List  
  
   
This list is accurate as of 8/16/18  2:52 PM.  Always use your most recent med list.  
  
  
  
  
 acetaminophen 325 mg tablet Commonly known as:  TYLENOL Take  by mouth every four (4) hours as needed for Pain. albuterol 90 mcg/actuation inhaler Commonly known as:  PROVENTIL HFA, VENTOLIN HFA, PROAIR HFA Take 1 puff by inhalation every four (4) hours as needed for Wheezing. cetirizine 10 mg tablet Commonly known as:  ZYRTEC Take 1 Tab by mouth daily. For allergies  
  
 ciclopirox 8 % solution Commonly known as:  PENLAC  
APPLY TO AFFECTED AREA TWICE DAILY EXTERNALLY AS DIRECTED.  
  
 cloNIDine HCl 0.1 mg tablet Commonly known as:  CATAPRES  
TAKE ONE TABLET BY MOUTH AT BEDTIME  
  
 DOC-Q-LACE 100 mg capsule Generic drug:  docusate sodium TAKE (1) CAPSULE BY MOUTH TWICE DAILY  
  
 fluticasone 50 mcg/actuation nasal spray Commonly known as:  FLONASE  
BLOW NOSE: 2 SPRAYS IN EACH NOSTRIL DAILY FOR ALLERGY. LASTACAFT 0.25 % Drop Generic drug:  alcaftadine Apply  to eye.  
  
 lisinopril 40 mg tablet Commonly known as:  Hiwot Cherry Take 40 mg by mouth daily. lisinopril-hydroCHLOROthiazide 20-25 mg per tablet Commonly known as:  PRINZIDE, ZESTORETIC  
TAKE 1 TABLET BY MOUTH DAILY ** OK TO CRUSH AND GIVE MEDS**  
  
 montelukast 10 mg tablet Commonly known as:  SINGULAIR Take 1 Tab by mouth daily. Indications: Allergic Rhinitis  
  
 nebivolol 10 mg tablet Commonly known as:  BYSTOLIC  
TAKE 1 TABLET BY MOUTH TWICE A DAY  
  
 OCUSOFT EYELID CLEANSING PADS Pads Generic drug:  Miscellaneous Medical Supply  
by Does Not Apply route. omeprazole 40 mg capsule Commonly known as:  PRILOSEC Take 40 mg by mouth daily. OTHER Pardeep's baby shampoo. use as lid scrub to each eye once daily  
  
 polyethylene glycol 17 gram/dose powder Commonly known as:  Kreg Patron Take 17 g by mouth daily. potassium chloride SR 10 mEq tablet Commonly known as:  KLOR-CON 10  
TAKE 1 TABLET BY MOUTH DAILY We Performed the Following CBC W/O DIFF [95170 CPT(R)] METABOLIC PANEL, BASIC [65573 CPT(R)] Follow-up Instructions Return in about 6 months (around 2/16/2019), or if symptoms worsen or fail to improve. Patient Instructions Potassium-Rich Diet: Care Instructions Your Care Instructions Potassium is a mineral. It helps keep the right mix of fluids in your body. It also helps your nerves and muscles work as they should. You'll find it in milk and meats. It's also in all fresh foods, including fruits and vegetables. Most adults need about 5 grams of potassium a day. The foods you eat should supply all that you need. Some health conditions can cause a loss of potassium. For example, kidney problems and stomach problems with vomiting and diarrhea can cause you to lose this mineral. Some medicines, such as water pills (diuretics), can cause low potassium. If you can't get enough potassium from what you eat, your doctor may advise you to take supplements. Follow-up care is a key part of your treatment and safety. Be sure to make and go to all appointments, and call your doctor if you are having problems. It's also a good idea to know your test results and keep a list of the medicines you take. How can you care for yourself at home? · Plan your diet around foods that are rich in potassium. Fresh, unprocessed whole foods have the most. These foods include: ¨ Milk and other dairy products. ¨ Vegetables, especially broccoli, cooked dry beans, tomatoes, potatoes, artichokes, winter squash, and spinach. ¨ Fruits, especially citrus fruits, bananas, and apricots. Dried apricots contain more potassium than fresh apricots. ¨ Meat, poultry, and fish. · Ask your doctor about using a salt substitute or \"light\" salt. These often contain potassium. Where can you learn more? Go to http://marie-angi.info/. Enter H315 in the search box to learn more about \"Potassium-Rich Diet: Care Instructions. \" Current as of: May 12, 2017 Content Version: 11.7 © 5193-9934 [employer]. Care instructions adapted under license by [employer] (which disclaims liability or warranty for this information). If you have questions about a medical condition or this instruction, always ask your healthcare professional. [address] any warranty or liability for your use of this information. Introducing [facility]! Red Coronado introduces B[employer]ndle patient portal. Now you can access parts of your medical record, email your doctor's office, and request medication refills online. 1. In your internet browser, go to [email or web address] 2. Click on the First Time User? Click Here link in the Sign In box. You will see the New Member Sign Up page. 3. Enter your B[employer]nd[employer] Access Code exactly as it appears below. You will not need to use this code after youve completed the sign-up process. If you do not sign up before the expiration date, you must request a new code. · Bunndle Access Code: [email or web address] Expires: 11/14/2018  2:45 PM 
 
4. Enter the last four digits of your Social Security Number (xxxx) and Date of Birth (mm/dd/yyyy) as indicated and click Submit. You will be taken to the next sign-up page. 5. Create a [employer] ID. This will be your Bunndle login ID and cannot be changed, so think of one that is secure and easy to remember. 6. Create a [employer] password. You can change your password at any time. 7. Enter your Password Reset Question and Answer. This can be used at a later time if you forget your password. 8. Enter your e-mail address. You will receive e-mail notification when new information is available in [address] 9. Click Sign Up. You can now view and download portions of your medical record. 10. Click the Download Summary menu link to download a portable copy of your medical information. If you have questions, please visit the Frequently Asked Questions section of the [employer] website. Remember, [employer]umen[employer] is NOT to be used for urgent needs. For medical emergencies, dial 911. Now available from your iPhone and Android! Please provide this summary of care documentation to your next provider. Your primary care clinician is listed as Renetta Gerard. If you have any questions after today's visit, please call [phone].

## 2018-08-17 DIAGNOSIS — L03.314 CELLULITIS OF GROIN: ICD-10-CM

## 2018-08-17 LAB
BUN SERPL-MCNC: 15 MG/DL (ref 6–24)
BUN/CREAT SERPL: 15 (ref 9–20)
CALCIUM SERPL-MCNC: 9.4 MG/DL (ref 8.7–10.2)
CHLORIDE SERPL-SCNC: 103 MMOL/L (ref 96–106)
CO2 SERPL-SCNC: 22 MMOL/L (ref 20–29)
CREAT SERPL-MCNC: 1.03 MG/DL (ref 0.76–1.27)
ERYTHROCYTE [DISTWIDTH] IN BLOOD BY AUTOMATED COUNT: 15.4 % (ref 12.3–15.4)
GLUCOSE SERPL-MCNC: 137 MG/DL (ref 65–99)
HCT VFR BLD AUTO: 35.2 % (ref 37.5–51)
HGB BLD-MCNC: 11.7 G/DL (ref 13–17.7)
MCH RBC QN AUTO: 31 PG (ref 26.6–33)
MCHC RBC AUTO-ENTMCNC: 33.2 G/DL (ref 31.5–35.7)
MCV RBC AUTO: 93 FL (ref 79–97)
PLATELET # BLD AUTO: 151 X10E3/UL (ref 150–379)
POTASSIUM SERPL-SCNC: 4.3 MMOL/L (ref 3.5–5.2)
RBC # BLD AUTO: 3.78 X10E6/UL (ref 4.14–5.8)
SODIUM SERPL-SCNC: 142 MMOL/L (ref 134–144)
WBC # BLD AUTO: 4 X10E3/UL (ref 3.4–10.8)

## 2018-08-20 RX ORDER — POLYETHYLENE GLYCOL 3350 17 G/17G
POWDER, FOR SOLUTION ORAL
Qty: 527 G | Refills: 0 | Status: SHIPPED | OUTPATIENT
Start: 2018-08-20 | End: 2018-08-21 | Stop reason: SDUPTHER

## 2018-08-21 DIAGNOSIS — L03.314 CELLULITIS OF GROIN: ICD-10-CM

## 2018-08-21 RX ORDER — POLYETHYLENE GLYCOL 3350 17 G/17G
POWDER, FOR SOLUTION ORAL
Qty: 527 G | Refills: 0 | Status: SHIPPED | OUTPATIENT
Start: 2018-08-21 | End: 2018-12-28 | Stop reason: ALTCHOICE

## 2018-09-14 DIAGNOSIS — E11.9 DIABETES MELLITUS WITHOUT COMPLICATION (HCC): ICD-10-CM

## 2018-09-14 DIAGNOSIS — L03.314 CELLULITIS OF GROIN: ICD-10-CM

## 2018-09-14 DIAGNOSIS — R56.9 SEIZURE (HCC): ICD-10-CM

## 2018-09-14 DIAGNOSIS — Z23 ENCOUNTER FOR IMMUNIZATION: ICD-10-CM

## 2018-09-14 DIAGNOSIS — Z00.00 ROUTINE GENERAL MEDICAL EXAMINATION AT A HEALTH CARE FACILITY: ICD-10-CM

## 2018-09-14 DIAGNOSIS — Z11.1 SCREENING EXAMINATION FOR PULMONARY TUBERCULOSIS: ICD-10-CM

## 2018-09-14 DIAGNOSIS — R73.03 PREDIABETES: ICD-10-CM

## 2018-09-14 DIAGNOSIS — I10 HTN, GOAL BELOW 130/80: ICD-10-CM

## 2018-09-17 RX ORDER — OMEPRAZOLE 40 MG/1
CAPSULE, DELAYED RELEASE ORAL
Qty: 30 CAP | Status: SHIPPED | OUTPATIENT
Start: 2018-09-17

## 2018-09-17 RX ORDER — LISINOPRIL 40 MG/1
40 TABLET ORAL DAILY
Qty: 90 TAB | Refills: 1 | Status: SHIPPED | OUTPATIENT
Start: 2018-09-17 | End: 2019-02-13 | Stop reason: SDUPTHER

## 2018-11-08 ENCOUNTER — OFFICE VISIT (OUTPATIENT)
Dept: FAMILY MEDICINE CLINIC | Age: 43
End: 2018-11-08

## 2018-11-08 VITALS
HEART RATE: 74 BPM | TEMPERATURE: 97.7 F | SYSTOLIC BLOOD PRESSURE: 118 MMHG | DIASTOLIC BLOOD PRESSURE: 105 MMHG | OXYGEN SATURATION: 96 % | WEIGHT: 121 LBS | RESPIRATION RATE: 18 BRPM | BODY MASS INDEX: 20.66 KG/M2 | HEIGHT: 64 IN

## 2018-11-08 DIAGNOSIS — R82.90 FOUL SMELLING URINE: ICD-10-CM

## 2018-11-08 DIAGNOSIS — Z23 ENCOUNTER FOR IMMUNIZATION: Primary | ICD-10-CM

## 2018-11-08 RX ORDER — NITROFURANTOIN (MACROCRYSTALS) 100 MG/1
100 CAPSULE ORAL
Qty: 5 CAP | Refills: 0 | Status: SHIPPED | OUTPATIENT
Start: 2018-11-08 | End: 2018-11-13

## 2018-11-08 RX ORDER — POLYETHYLENE GLYCOL 3350 17 G/17G
17 POWDER, FOR SOLUTION ORAL
Qty: 30 EACH | Refills: 4 | Status: SHIPPED | OUTPATIENT
Start: 2018-11-08 | End: 2018-12-28

## 2018-11-08 NOTE — PROGRESS NOTES
Chief Complaint Patient presents with  Nasal Congestion  Urinary Odor 1. Have you been to the ER, urgent care clinic since your last visit? Hospitalized since your last visit? No 
 
2. Have you seen or consulted any other health care providers outside of the [address] [place] [address] since your last visit? Include any pap smears or colon screening. No 
 
 
Came in today with care giver, Luca Bunn. Stated pt has had increased nasal congestion x 1 week,  Taking singulair and flonase as prescribed. C/o foul \"fishy\" smelling urine x 1 week Dr Jacob Malik notified of elevated blood pressure,  Caregiver stated his bp has bee elevated x 2 months After obtaining consent, and per orders of , flu vaccine given to  Right deltoid IM . Patient instructed to remain in clinic for 15 minutes afterwards, and to report any adverse reaction to me immediately. Patient did not have any adverse reactions during this office visit

## 2018-11-08 NOTE — PROGRESS NOTES
HISTORY OF PRESENT ILLNESS Surekha Ridley is a 37 y.o. male. HPI Pt with Sever MR present for flu shot vacs, otherwise stable as per the nursing provider he lives in a group home and has not any complain recently, at this time this clinic is unable to get any labs or others including not allowing him to take His VS, except for foul odor urine with multiple urination pt is incont at thistime,  
 
Current Outpatient Medications Medication Sig Dispense Refill  omeprazole (PRILOSEC) 40 mg capsule TAKE 1 CAPSULE BY MOUTH EACH MORNING 30 MINUTES BEFORE BREAKFAST **OKTO OPEN CAPSULE AND GIVE CONTENTS** 30 Cap PRN  
 lisinopril (PRINIVIL, ZESTRIL) 40 mg tablet Take 1 Tab by mouth daily. 90 Tab 1  polyethylene glycol (MIRALAX) 17 gram/dose powder MIX 1 CAPFUL (17GRAMS) INTO 8 OUNCES OF FLUID AND DRINK ONCE DAILY ASNEEDED FOR CONSTIPATION, NO BOWEL MOVEMENT FOR 48 HOURS AND THEN HOLD 527 g 0  
 potassium chloride SR (KLOR-CON 10) 10 mEq tablet TAKE 1 TABLET BY MOUTH DAILY 30 Tab 5  
 acetaminophen (TYLENOL) 325 mg tablet Take  by mouth every four (4) hours as needed for Pain.  DOC-Q-LACE 100 mg capsule TAKE (1) CAPSULE BY MOUTH TWICE DAILY 60 Cap PRN  
 fluticasone (FLONASE) 50 mcg/actuation nasal spray BLOW NOSE: 2 SPRAYS IN EACH NOSTRIL DAILY FOR ALLERGY. 16 g 11  
 montelukast (SINGULAIR) 10 mg tablet Take 1 Tab by mouth daily. Indications: Allergic Rhinitis 30 Tab 6  
 ciclopirox (PENLAC) 8 % solution APPLY TO AFFECTED AREA TWICE DAILY EXTERNALLY AS DIRECTED. 6.6 mL 11  
 nebivolol (BYSTOLIC) 10 mg tablet TAKE 1 TABLET BY MOUTH TWICE A DAY 60 Tab 3  cloNIDine HCl (CATAPRES) 0.1 mg tablet TAKE ONE TABLET BY MOUTH AT BEDTIME 30 Tab 6  cetirizine (ZYRTEC) 10 mg tablet Take 1 Tab by mouth daily.  For allergies 30 Tab 12  
 lisinopril-hydroCHLOROthiazide (PRINZIDE, ZESTORETIC) 20-25 mg per tablet TAKE 1 TABLET BY MOUTH DAILY ** OK TO CRUSH AND GIVE MEDS** 31 Tab PRN  
  OTHER Pardeep's baby shampoo. use as lid scrub to each eye once daily  Miscellaneous Medical Supply (OCUSOFT EYELID CLEANSING PADS) pads by Does Not Apply route.  alcaftadine (LASTACAFT) 0.25 % drop Apply  to eye.  albuterol (PROVENTIL HFA, VENTOLIN HFA, PROAIR HFA) 90 mcg/actuation inhaler Take 1 puff by inhalation every four (4) hours as needed for Wheezing. (Patient not taking: Reported on 3/28/2018) 1 Inhaler 1 Allergies Allergen Reactions  Zofran [Ondansetron Hcl] Itching Arm turned red & itched  No Known Allergies Hives Past Medical History:  
Diagnosis Date  Acute pharyngitis 9/29/2014  Agitation requiring sedation protocol 4/11/2018  Bronchitis, acute 12/12/2013  Cellulitis 7/26/2018  Cellulitis of groin 3/23/2016  Chronic rhinitis 4/11/2018  Contact dermatitis and other eczema, due to unspecified cause  Decrease in appetite 12/12/2013  Diabetes ([facility] [address]  Dysphagia 10/22/2014  Fall at nursing home 7/24/2017  Fever in adult 9/29/2014  Hypertension  Inguinal bulge 4/11/2018  Mental retardation  Prediabetes 10/2/2014  Screening for glaucoma 12/3/2014  Seizure ([address] 11/27/2017  Stool incontinence 4/11/2018 Past Surgical History:  
Procedure Laterality Date  HX HEENT    
 dental surg. June 7, 2010 Family History Family history unknown: Yes  
 
Social History Tobacco Use  Smoking status: Never Smoker  Smokeless tobacco: Never Used Substance Use Topics  Alcohol use: No  
  
Lab Results Component Value Date/Time WBC 4.0 08/16/2018 02:55 PM  
 HGB 11.7 (L) 08/16/2018 02:55 PM  
 HCT 35.2 (L) 08/16/2018 02:55 PM  
 PLATELET 436 72/82/9864 02:55 PM  
 MCV 93 08/16/2018 02:55 PM  
 
Lab Results Component Value Date/Time  GFR est non-AA 87 08/16/2018 02:55 PM  
 GFR est  08/16/2018 02:55 PM  
 Creatinine 1.03 08/16/2018 02:55 PM  
 BUN 15 08/16/2018 02:55 PM  
 Sodium 142 08/16/2018 02:55 PM  
 Potassium 4.3 08/16/2018 02:55 PM  
 Chloride 103 08/16/2018 02:55 PM  
 CO2 22 08/16/2018 02:55 PM  
  
Review of Systems Unable to perform ROS: Medical condition Constitutional: Negative for chills and fever. HENT: Negative for ear pain and nosebleeds. Eyes: Negative for blurred vision, pain and discharge. Respiratory: Negative for shortness of breath. Cardiovascular: Negative for chest pain and leg swelling. Gastrointestinal: Negative for constipation, diarrhea, nausea and vomiting. Genitourinary: Negative for frequency. Musculoskeletal: Negative for joint pain. Skin: Negative for itching and rash. Neurological: Negative for headaches. Psychiatric/Behavioral: Negative for depression. The patient is not nervous/anxious. All other systems reviewed and are negative. Physical Exam  
Constitutional: He is oriented to person, place, and time. He appears well-developed and well-nourished. HENT:  
Head: Normocephalic and atraumatic. Mouth/Throat: No oropharyngeal exudate. Eyes: Conjunctivae and EOM are normal.  
Neck: Normal range of motion. Neck supple. Cardiovascular: Normal rate, regular rhythm and normal heart sounds. No murmur heard. Pulmonary/Chest: Effort normal and breath sounds normal. No respiratory distress. Abdominal: Soft. Bowel sounds are normal. He exhibits no distension. There is no rebound. Musculoskeletal: He exhibits no edema or tenderness. Neurological: He is alert and oriented to person, place, and time. Skin: Skin is warm. No erythema. Psychiatric: He has a normal mood and affect. His behavior is normal.  
Nursing note and vitals reviewed. ASSESSMENT and PLAN Diagnoses and all orders for this visit: 1.  Encounter for immunization 
-     INFLUENZA VIRUS VAC QUAD,SPLIT,PRESV FREE SYRINGE IM 
-     UT IMMUNIZ ADMIN,1 SINGLE/COMB VAC/TOXOID 
-     COLLECTION VENOUS BLOOD,VENIPUNCTURE 
 
 2. Foul smelling urine 
-     HDL CHOLESTEROL 
-     FERRITIN 
-     CBC W/O DIFF 
-     TSH 3RD GENERATION 
-     METABOLIC PANEL, COMPREHENSIVE; Future -     VITAMIN B12 & FOLATE 
-     AMB POC URINALYSIS DIP STICK AUTO W/O MICRO; Future -     COLLECTION VENOUS BLOOD,VENIPUNCTURE Other orders 
-     nitrofurantoin (MACRODANTIN) 100 mg capsule; Take 1 Cap by mouth nightly for 5 days. -     polyethylene glycol (MIRALAX) 17 gram packet; Take 1 Packet by mouth daily as needed. Hold for loose stool

## 2018-11-09 LAB
ALBUMIN SERPL-MCNC: 4.4 G/DL (ref 3.5–5.5)
ALBUMIN/GLOB SERPL: 1.4 {RATIO} (ref 1.2–2.2)
ALP SERPL-CCNC: 65 IU/L (ref 39–117)
ALT SERPL-CCNC: 16 IU/L (ref 0–44)
AST SERPL-CCNC: 13 IU/L (ref 0–40)
BILIRUB SERPL-MCNC: <0.2 MG/DL (ref 0–1.2)
BUN SERPL-MCNC: 13 MG/DL (ref 6–24)
BUN/CREAT SERPL: 16 (ref 9–20)
CALCIUM SERPL-MCNC: 9.2 MG/DL (ref 8.7–10.2)
CHLORIDE SERPL-SCNC: 102 MMOL/L (ref 96–106)
CO2 SERPL-SCNC: 27 MMOL/L (ref 20–29)
CREAT SERPL-MCNC: 0.82 MG/DL (ref 0.76–1.27)
ERYTHROCYTE [DISTWIDTH] IN BLOOD BY AUTOMATED COUNT: 15.5 % (ref 12.3–15.4)
FERRITIN SERPL-MCNC: 121 NG/ML (ref 30–400)
FOLATE SERPL-MCNC: 13.9 NG/ML
GLOBULIN SER CALC-MCNC: 3.1 G/DL (ref 1.5–4.5)
GLUCOSE SERPL-MCNC: 138 MG/DL (ref 65–99)
HCT VFR BLD AUTO: 38.7 % (ref 37.5–51)
HDLC SERPL-MCNC: 42 MG/DL
HGB BLD-MCNC: 12.6 G/DL (ref 13–17.7)
MCH RBC QN AUTO: 29.7 PG (ref 26.6–33)
MCHC RBC AUTO-ENTMCNC: 32.6 G/DL (ref 31.5–35.7)
MCV RBC AUTO: 91 FL (ref 79–97)
PLATELET # BLD AUTO: 173 X10E3/UL (ref 150–379)
POTASSIUM SERPL-SCNC: 4.4 MMOL/L (ref 3.5–5.2)
PROT SERPL-MCNC: 7.5 G/DL (ref 6–8.5)
RBC # BLD AUTO: 4.24 X10E6/UL (ref 4.14–5.8)
SODIUM SERPL-SCNC: 142 MMOL/L (ref 134–144)
TSH SERPL DL<=0.005 MIU/L-ACNC: 1.86 UIU/ML (ref 0.45–4.5)
VIT B12 SERPL-MCNC: 1133 PG/ML (ref 232–1245)
WBC # BLD AUTO: 5 X10E3/UL (ref 3.4–10.8)

## 2018-11-12 DIAGNOSIS — R45.1 AGITATION REQUIRING SEDATION PROTOCOL: ICD-10-CM

## 2018-11-12 DIAGNOSIS — Z23 ENCOUNTER FOR IMMUNIZATION: ICD-10-CM

## 2018-11-12 DIAGNOSIS — R56.9 SEIZURE (HCC): ICD-10-CM

## 2018-11-12 DIAGNOSIS — F73 PROFOUND MENTAL RETARDATION IN ADULT: ICD-10-CM

## 2018-11-12 DIAGNOSIS — D69.6 PLATELETS DECREASED (HCC): ICD-10-CM

## 2018-11-12 DIAGNOSIS — J31.0 CHRONIC RHINITIS: ICD-10-CM

## 2018-11-12 DIAGNOSIS — R19.09 INGUINAL BULGE: ICD-10-CM

## 2018-11-13 RX ORDER — NEBIVOLOL 10 MG/1
TABLET ORAL
Qty: 60 TAB | Refills: 6 | Status: SHIPPED | OUTPATIENT
Start: 2018-11-13 | End: 2019-02-13 | Stop reason: SDUPTHER

## 2018-11-13 RX ORDER — MONTELUKAST SODIUM 10 MG/1
10 TABLET ORAL DAILY
Qty: 30 TAB | Refills: 6 | Status: SHIPPED | OUTPATIENT
Start: 2018-11-13 | End: 2019-06-12 | Stop reason: SDUPTHER

## 2018-11-13 RX ORDER — CLONIDINE HYDROCHLORIDE 0.1 MG/1
TABLET ORAL
Qty: 30 TAB | Refills: 5 | Status: SHIPPED | OUTPATIENT
Start: 2018-11-13 | End: 2019-01-09 | Stop reason: SDUPTHER

## 2018-11-13 RX ORDER — CETIRIZINE HCL 10 MG
TABLET ORAL
Qty: 30 TAB | Status: SHIPPED | OUTPATIENT
Start: 2018-11-13 | End: 2022-01-12 | Stop reason: DRUGHIGH

## 2018-12-12 ENCOUNTER — DOCUMENTATION ONLY (OUTPATIENT)
Dept: INTERNAL MEDICINE CLINIC | Age: 43
End: 2018-12-12

## 2018-12-28 ENCOUNTER — OFFICE VISIT (OUTPATIENT)
Dept: FAMILY MEDICINE CLINIC | Age: 43
End: 2018-12-28

## 2018-12-28 VITALS
SYSTOLIC BLOOD PRESSURE: 150 MMHG | HEART RATE: 65 BPM | DIASTOLIC BLOOD PRESSURE: 100 MMHG | BODY MASS INDEX: 20.77 KG/M2 | HEIGHT: 64 IN | RESPIRATION RATE: 14 BRPM | TEMPERATURE: 98.4 F

## 2018-12-28 DIAGNOSIS — N39.0 URINARY TRACT INFECTION WITHOUT HEMATURIA, SITE UNSPECIFIED: ICD-10-CM

## 2018-12-28 DIAGNOSIS — R82.90 BAD ODOR OF URINE: Primary | ICD-10-CM

## 2018-12-28 LAB
BACTERIA UA POCT, BACTPOCT: NORMAL
BILIRUB UR QL STRIP: NEGATIVE
CASTS UA POCT: NORMAL
CLUE CELLS, CLUEPOCT: NORMAL
CRYSTALS UA POCT, CRYSPOCT: NORMAL
EPITHELIAL CELLS POCT: NORMAL
GLUCOSE UR-MCNC: NEGATIVE MG/DL
KETONES P FAST UR STRIP-MCNC: NEGATIVE MG/DL
MUCUS UA POCT, MUCPOCT: NORMAL
PH UR STRIP: 6 [PH] (ref 4.6–8)
PROT UR QL STRIP: NORMAL
RBC UA POCT, RBCPOCT: NORMAL
SP GR UR STRIP: 1.02 (ref 1–1.03)
TRICH UA POCT, TRICHPOC: NORMAL
UA UROBILINOGEN AMB POC: NORMAL (ref 0.2–1)
URINALYSIS CLARITY POC: NORMAL
URINALYSIS COLOR POC: NORMAL
URINE BLOOD POC: NEGATIVE
URINE CULT COMMENT, POCT: NORMAL
URINE LEUKOCYTES POC: NORMAL
URINE NITRITES POC: NEGATIVE
WBC UA POCT, WBCPOCT: NORMAL
YEAST UA POCT, YEASTPOC: NORMAL

## 2018-12-28 RX ORDER — NITROFURANTOIN (MACROCRYSTALS) 100 MG/1
100 CAPSULE ORAL 2 TIMES DAILY
Qty: 14 CAP | Refills: 0 | Status: SHIPPED | OUTPATIENT
Start: 2018-12-28 | End: 2019-01-04

## 2018-12-28 RX ORDER — POLYETHYLENE GLYCOL 3350 17 G/17G
17 POWDER, FOR SOLUTION ORAL DAILY
Qty: 30 EACH | Refills: 12 | Status: SHIPPED | OUTPATIENT
Start: 2018-12-28 | End: 2019-06-12 | Stop reason: SDUPTHER

## 2018-12-28 NOTE — PROGRESS NOTES
Chief Complaint Patient presents with  Urinary Odor 1. Have you been to the ER, urgent care clinic since your last visit? Hospitalized since your last visit? No 
 
2. Have you seen or consulted any other health care providers outside of the [address] [place] [address] since your last visit? Include any pap smears or colon screening. No  
 
Health Maintenance Due Topic Date Due  
 FOOT EXAM Q1  03/23/2017  
 EYE EXAM RETINAL OR DILATED  07/09/2017  MICROALBUMIN Q1  07/25/2018  HEMOGLOBIN A1C Q6M  09/28/2018  MEDICARE YEARLY EXAM  10/29/2018

## 2019-01-01 LAB
BACTERIA UR CULT: ABNORMAL
BACTERIA UR CULT: ABNORMAL

## 2019-01-09 ENCOUNTER — OFFICE VISIT (OUTPATIENT)
Dept: FAMILY MEDICINE CLINIC | Age: 44
End: 2019-01-09

## 2019-01-09 VITALS
HEIGHT: 64 IN | BODY MASS INDEX: 23.32 KG/M2 | WEIGHT: 136.6 LBS | SYSTOLIC BLOOD PRESSURE: 158 MMHG | RESPIRATION RATE: 20 BRPM | HEART RATE: 62 BPM | TEMPERATURE: 96.6 F | DIASTOLIC BLOOD PRESSURE: 96 MMHG

## 2019-01-09 DIAGNOSIS — I10 ESSENTIAL HYPERTENSION: Primary | ICD-10-CM

## 2019-01-09 DIAGNOSIS — R56.9 SEIZURE (HCC): ICD-10-CM

## 2019-01-09 DIAGNOSIS — Z23 ENCOUNTER FOR IMMUNIZATION: ICD-10-CM

## 2019-01-09 DIAGNOSIS — D69.6 PLATELETS DECREASED (HCC): ICD-10-CM

## 2019-01-09 RX ORDER — CLONIDINE HYDROCHLORIDE 0.2 MG/1
TABLET ORAL
Qty: 30 TAB | Refills: 6 | Status: SHIPPED | OUTPATIENT
Start: 2019-01-09 | End: 2019-02-13 | Stop reason: SDUPTHER

## 2019-01-09 NOTE — PATIENT INSTRUCTIONS
Potassium-Rich Diet: Care Instructions Your Care Instructions Potassium is a mineral. It helps keep the right mix of fluids in your body. It also helps your nerves and muscles work as they should. You'll find it in milk and meats. It's also in all fresh foods, including fruits and vegetables. Most adults need about 5 grams of potassium a day. The foods you eat should supply all that you need. Some health conditions can cause a loss of potassium. For example, kidney problems and stomach problems with vomiting and diarrhea can cause you to lose this mineral. Some medicines, such as water pills (diuretics), can cause low potassium. If you can't get enough potassium from what you eat, your doctor may advise you to take supplements. Follow-up care is a key part of your treatment and safety. Be sure to make and go to all appointments, and call your doctor if you are having problems. It's also a good idea to know your test results and keep a list of the medicines you take. How can you care for yourself at home? · Plan your diet around foods that are rich in potassium. Fresh, unprocessed whole foods have the most. These foods include: ? Milk and other dairy products. ? Vegetables, especially broccoli, cooked dry beans, tomatoes, potatoes, artichokes, winter squash, and spinach. ? Fruits, especially citrus fruits, bananas, and apricots. Dried apricots contain more potassium than fresh apricots. ? Meat, poultry, and fish. · Ask your doctor about using a salt substitute or \"light\" salt. These often contain potassium. Where can you learn more? Go to http://marie-agni.info/. Enter H315 in the search box to learn more about \"Potassium-Rich Diet: Care Instructions. \" Current as of: March 29, 2018 Content Version: 11.8 © 2066-7246 Healthwise, [employer].  Care instructions adapted under license by [employer] (which disclaims liability or warranty for this information). If you have questions about a medical condition or this instruction, always ask your healthcare professional. [address] any warranty or liability for your use of this information. Low Sodium Diet (2,000 Milligram): Care Instructions Your Care Instructions Too much sodium causes your body to hold on to extra water. This can raise your blood pressure and force your heart and kidneys to work harder. In very serious cases, this could cause you to be put in the hospital. It might even be life-threatening. By limiting sodium, you will feel better and lower your risk of serious problems. The most common source of sodium is salt. People get most of the salt in their diet from canned, prepared, and packaged foods. Fast food and restaurant meals also are very high in sodium. Your doctor will probably limit your sodium to less than 2,000 milligrams (mg) a day. This limit counts all the sodium in prepared and packaged foods and any salt you add to your food. Follow-up care is a key part of your treatment and safety. Be sure to make and go to all appointments, and call your doctor if you are having problems. It's also a good idea to know your test results and keep a list of the medicines you take. How can you care for yourself at home? Read food labels · Read labels on cans and food packages. The labels tell you how much sodium is in each serving. Make sure that you look at the serving size. If you eat more than the serving size, you have eaten more sodium. · Food labels also tell you the Percent Daily Value for sodium. Choose products with low Percent Daily Values for sodium. · Be aware that sodium can come in forms other than salt, including monosodium glutamate (MSG), sodium citrate, and sodium bicarbonate (baking soda). MSG is often added to Asian food. When you eat out, you can sometimes ask for food without MSG or added salt. Buy low-sodium foods · Buy foods that are labeled \"unsalted\" (no salt added), \"sodium-free\" (less than 5 mg of sodium per serving), or \"low-sodium\" (less than 140 mg of sodium per serving). Foods labeled \"reduced-sodium\" and \"light sodium\" may still have too much sodium. Be sure to read the label to see how much sodium you are getting. · Buy fresh vegetables, or frozen vegetables without added sauces. Buy low-sodium versions of canned vegetables, soups, and other canned goods. Prepare low-sodium meals · Cut back on the amount of salt you use in cooking. This will help you adjust to the taste. Do not add salt after cooking. One teaspoon of salt has about 2,300 mg of sodium. · Take the salt shaker off the table. · Flavor your food with garlic, lemon juice, onion, vinegar, herbs, and spices. Do not use soy sauce, lite soy sauce, steak sauce, onion salt, garlic salt, celery salt, mustard, or ketchup on your food. · Use low-sodium salad dressings, sauces, and ketchup. Or make your own salad dressings and sauces without adding salt. · Use less salt (or none) when recipes call for it. You can often use half the salt a recipe calls for without losing flavor. Other foods such as rice, pasta, and grains do not need added salt. · Rinse canned vegetables, and cook them in fresh water. This removes somebut not allof the salt. · Avoid water that is naturally high in sodium or that has been treated with water softeners, which add sodium. Call your local water company to find out the sodium content of your water supply. If you buy bottled water, read the label and choose a sodium-free brand. Avoid high-sodium foods · Avoid eating: 
? Smoked, cured, salted, and canned meat, fish, and poultry. ? Ham, stoner, hot dogs, and luncheon meats. ? Regular, hard, and processed cheese and regular peanut butter. ? Crackers with salted tops, and other salted snack foods such as pretzels, chips, and salted popcorn. ? Frozen prepared meals, unless labeled low-sodium. ? Canned and dried soups, broths, and bouillon, unless labeled sodium-free or low-sodium. ? Canned vegetables, unless labeled sodium-free or low-sodium. ? Western Leena fries, pizza, tacos, and other fast foods. ? Pickles, olives, ketchup, and other condiments, especially soy sauce, unless labeled sodium-free or low-sodium. Where can you learn more? Go to http://marie-angi.info/. Enter G163 in the search box to learn more about \"Low Sodium Diet (2,000 Milligram): Care Instructions. \" Current as of: March 29, 2018 Content Version: 11.8 © 7580-1208 [employer]. Care instructions adapted under license by [employer] (which disclaims liability or warranty for this information). If you have questions about a medical condition or this instruction, always ask your healthcare professional. [place] [address] any warranty or liability for your use of this information. DASH Diet: Care Instructions Your Care Instructions The DASH diet is an eating plan that can help lower your blood pressure. DASH stands for Dietary Approaches to Stop Hypertension. Hypertension is high blood pressure. The DASH diet focuses on eating foods that are high in calcium, potassium, and magnesium. These nutrients can lower blood pressure. The foods that are highest in these nutrients are fruits, vegetables, low-fat dairy products, nuts, seeds, and legumes. But taking calcium, potassium, and magnesium supplements instead of eating foods that are high in those nutrients does not have the same effect. The DASH diet also includes whole grains, fish, and poultry. The DASH diet is one of several lifestyle changes your doctor may recommend to lower your high blood pressure. Your doctor may also want you to decrease the amount of sodium in your diet.  Lowering sodium while following the DASH diet can lower blood pressure even further than just the DASH diet alone. Follow-up care is a key part of your treatment and safety. Be sure to make and go to all appointments, and call your doctor if you are having problems. It's also a good idea to know your test results and keep a list of the medicines you take. How can you care for yourself at home? Following the DASH diet · Eat 4 to 5 servings of fruit each day. A serving is 1 medium-sized piece of fruit, ½ cup chopped or canned fruit, 1/4 cup dried fruit, or 4 ounces (½ cup) of fruit juice. Choose fruit more often than fruit juice. · Eat 4 to 5 servings of vegetables each day. A serving is 1 cup of lettuce or raw leafy vegetables, ½ cup of chopped or cooked vegetables, or 4 ounces (½ cup) of vegetable juice. Choose vegetables more often than vegetable juice. · Get 2 to 3 servings of low-fat and fat-free dairy each day. A serving is 8 ounces of milk, 1 cup of yogurt, or 1 ½ ounces of cheese. · Eat 6 to 8 servings of grains each day. A serving is 1 slice of bread, 1 ounce of dry cereal, or ½ cup of cooked rice, pasta, or cooked cereal. Try to choose whole-grain products as much as possible. · Limit lean meat, poultry, and fish to 2 servings each day. A serving is 3 ounces, about the size of a deck of cards. · Eat 4 to 5 servings of nuts, seeds, and legumes (cooked dried beans, lentils, and split peas) each week. A serving is 1/3 cup of nuts, 2 tablespoons of seeds, or ½ cup of cooked beans or peas. · Limit fats and oils to 2 to 3 servings each day. A serving is 1 teaspoon of vegetable oil or 2 tablespoons of salad dressing. · Limit sweets and added sugars to 5 servings or less a week. A serving is 1 tablespoon jelly or jam, ½ cup sorbet, or 1 cup of lemonade. · Eat less than 2,300 milligrams (mg) of sodium a day. If you limit your sodium to 1,500 mg a day, you can lower your blood pressure even more. Tips for success · Start small. Do not try to make dramatic changes to your diet all at once. You might feel that you are missing out on your favorite foods and then be more likely to not follow the plan. Make small changes, and stick with them. Once those changes become habit, add a few more changes. · Try some of the following: ? Make it a goal to eat a fruit or vegetable at every meal and at snacks. This will make it easy to get the recommended amount of fruits and vegetables each day. ? Try yogurt topped with fruit and nuts for a snack or healthy dessert. ? Add lettuce, tomato, cucumber, and onion to sandwiches. ? Combine a ready-made pizza crust with low-fat mozzarella cheese and lots of vegetable toppings. Try using tomatoes, squash, spinach, broccoli, carrots, cauliflower, and onions. ? Have a variety of cut-up vegetables with a low-fat dip as an appetizer instead of chips and dip. ? Sprinkle sunflower seeds or chopped almonds over salads. Or try adding chopped walnuts or almonds to cooked vegetables. ? Try some vegetarian meals using beans and peas. Add garbanzo or kidney beans to salads. Make burritos and tacos with mashed stewart beans or black beans. Where can you learn more? Go to http://marie-angi.info/. Enter X535 in the search box to learn more about \"DASH Diet: Care Instructions. \" Current as of: December 6, 2017 Content Version: 11.8 © 2335-5166 [employer]. Care instructions adapted under license by [employer] (which disclaims liability or warranty for this information). If you have questions about a medical condition or this instruction, always ask your healthcare professional. [address] any warranty or liability for your use of this information.

## 2019-01-09 NOTE — LETTER
1/9/2019 1:11 PM 
 
Mr. Shelly Rice [address] [facility] [address] [id number] Dear Shelly Rice: Please find your most recent results below. Recent Results (from the past 2016 hour(s)) HDL CHOLESTEROL Collection Time: 11/08/18  2:12 PM  
Result Value Ref Range HDL Cholesterol 42 >39 mg/dL FERRITIN Collection Time: 11/08/18  2:12 PM  
Result Value Ref Range Ferritin 121 30 - 400 ng/mL CBC W/O DIFF Collection Time: 11/08/18  2:12 PM  
Result Value Ref Range WBC 5.0 3.4 - 10.8 x10E3/uL  
 RBC 4.24 4.14 - 5.80 x10E6/uL HGB 12.6 (L) 13.0 - 17.7 g/dL HCT 38.7 37.5 - 51.0 % MCV 91 79 - 97 fL  
 MCH 29.7 26.6 - 33.0 pg  
 MCHC 32.6 31.5 - 35.7 g/dL  
 RDW 15.5 (H) 12.3 - 15.4 % PLATELET 060 227 - 345 x10E3/uL TSH 3RD GENERATION Collection Time: 11/08/18  2:12 PM  
Result Value Ref Range TSH 1.860 0.450 - 4.500 uIU/mL VITAMIN B12 & FOLATE Collection Time: 11/08/18  2:12 PM  
Result Value Ref Range Vitamin B12 1,133 232 - 1,245 pg/mL Folate 13.9 >3.0 ng/mL METABOLIC PANEL, COMPREHENSIVE Collection Time: 11/08/18  2:12 PM  
Result Value Ref Range Glucose 138 (H) 65 - 99 mg/dL BUN 13 6 - 24 mg/dL Creatinine 0.82 0.76 - 1.27 mg/dL GFR est non- >59 mL/min/1.73 GFR est  >59 mL/min/1.73  
 BUN/Creatinine ratio 16 9 - 20 Sodium 142 134 - 144 mmol/L Potassium 4.4 3.5 - 5.2 mmol/L Chloride 102 96 - 106 mmol/L  
 CO2 27 20 - 29 mmol/L Calcium 9.2 8.7 - 10.2 mg/dL Protein, total 7.5 6.0 - 8.5 g/dL Albumin 4.4 3.5 - 5.5 g/dL GLOBULIN, TOTAL 3.1 1.5 - 4.5 g/dL A-G Ratio 1.4 1.2 - 2.2 Bilirubin, total <0.2 0.0 - 1.2 mg/dL Alk. phosphatase 65 39 - 117 IU/L  
 AST (SGOT) 13 0 - 40 IU/L  
 ALT (SGPT) 16 0 - 44 IU/L  
AMB POC URINALYSIS DIP STICK AUTO W/ MICRO Collection Time: 12/28/18  1:15 PM  
Result Value Ref Range Color (UA POC) Clarity (UA POC) Cloudy Glucose (UA POC) Negative Negative Bilirubin (UA POC) Negative Negative Ketones (UA POC) Negative Negative Specific gravity (UA POC) 1.020 1.001 - 1.035 Blood (UA POC) Negative Negative pH (UA POC) 6.0 4.6 - 8.0 Protein (UA POC) 1+ Negative Urobilinogen (UA POC) 0.2 mg/dL 0.2 - 1 Nitrites (UA POC) Negative Negative Leukocyte esterase (UA POC) 1+ Negative Epithelial cells (UA POC) Mucus (UA POC) WBCs (UA POC) RBCs (UA POC) Casts (UA POC)  Negative Crystals (UA POC)  Negative Clue Cells (UA POC) Trichomonas (UA POC) Yeast (UA POC) Bacteria (UA POC)  Negative URINE CULT COMMENT (UA POC) CULTURE, URINE Collection Time: 12/28/18  1:15 PM  
Result Value Ref Range Urine Culture, Routine (A) Escherichia coli Identified by an automated biochemical system. Urine Culture, Routine Mixed urogenital chalo Less than 10,000 colonies/mL Susceptibility Escherichia coli -  (no method available)* Amoxicillin/Clavulanic A S Susceptible ug/mL Ampicillin ($) R Resistant ug/mL Cefepime ($$) S Susceptible ug/mL Ceftriaxone ($) S Susceptible ug/mL Cefuroxime ($) S Susceptible ug/mL Ciprofloxacin ($) S Susceptible ug/mL Ertapenem ($$$$) S Susceptible ug/mL Gentamicin ($) S Susceptible ug/mL Imipenem S Susceptible ug/mL Levofloxacin ($) S Susceptible ug/mL Meropenem ($$) S Susceptible ug/mL Nitrofurantoin S Susceptible ug/mL Piperacillin/Tazobac ($) S Susceptible ug/mL Tetracycline S Susceptible ug/mL Tobramycin ($) S Susceptible ug/mL Trimeth-Sulfamethoxa S Susceptible ug/mL * Performed at:  [address] [place] [address] Director: Robert Fernandez MD, Phone:  [phone] RECOMMENDATIONS: 
 
Normal test except for Your urine test that came back to be abnormal but treated successfully with medication,   please call and make and appointment in 2-3 months for repeat test based on your convenience  for further testing and lab discussion, Please call me if you have any questions: [phone] Sincerely, 
 
 
America Alberts MD

## 2019-01-09 NOTE — PROGRESS NOTES
HISTORY OF PRESENT ILLNESS Dante Martinez is a 37 y.o. male. HPI Mr Mayco Todd with [facility] present for bp check otherwise stable as per the nursing provider, pt is W/C bound and he stukk live in a group home and has not had any complaint recently except for the treated foul urin, but so far this clinic is unable to get any labs in 2019,  
 Today pt present for Bp check and the patient stating that so far there has been a Compliancy w/ the bp meds, he is having had the low salt diet,  today the pt's providers denies Chest Pain, has no legs swelling no lightheadedness,   otherwise feeling better since the last visit Current Outpatient Medications Medication Sig Dispense Refill  polyethylene glycol (MIRALAX) 17 gram packet Take 1 Packet by mouth daily. Hold for loose stool (Patient taking differently: Take 17 g by mouth daily as needed. Hold for loose stool) 30 Each 12  
 nebivolol (BYSTOLIC) 10 mg tablet TAKE 1 TABLET BY MOUTH TWICE A DAY  OK TO CRUSH AND GIVE MEDS 60 Tab 6  cloNIDine HCl (CATAPRES) 0.1 mg tablet TAKE ONE TABLET BY MOUTH AT BEDTIME  OK TO CRUSH AND GIVE MEDS 30 Tab 5  
 montelukast (SINGULAIR) 10 mg tablet Take 1 Tab by mouth daily. 30 Tab 6  cetirizine (ZYRTEC) 10 mg tablet TAKE 1 TABLET BY MOUTH DAILY FOR ALLERGIES ** OK TO CRUSH AND GIVE MEDS** 30 Tab PRN  
 omeprazole (PRILOSEC) 40 mg capsule TAKE 1 CAPSULE BY MOUTH EACH MORNING 30 MINUTES BEFORE BREAKFAST **OKTO OPEN CAPSULE AND GIVE CONTENTS** 30 Cap PRN  
 lisinopril (PRINIVIL, ZESTRIL) 40 mg tablet Take 1 Tab by mouth daily. 90 Tab 1  potassium chloride SR (KLOR-CON 10) 10 mEq tablet TAKE 1 TABLET BY MOUTH DAILY 30 Tab 5  
 acetaminophen (TYLENOL) 325 mg tablet Take  by mouth every four (4) hours as needed for Pain.     
 DOC-Q-LACE 100 mg capsule TAKE (1) CAPSULE BY MOUTH TWICE DAILY (Patient taking differently: TAKE (1) CAPSULE BY MOUTH TWICE DAILY AS NEEDED) 60 Cap PRN  
  fluticasone (FLONASE) 50 mcg/actuation nasal spray BLOW NOSE: 2 SPRAYS IN EACH NOSTRIL DAILY FOR ALLERGY. 16 g 11  
 OTHER Pardeep's baby shampoo. use as lid scrub to each eye once daily  Miscellaneous [employer] (OCUSOFT EYELID CLEANSING PADS) pads by Does Not Apply route.  alcaftadine (LASTACAFT) 0.25 % drop Apply  to eye.  albuterol (PROVENTIL HFA, VENTOLIN HFA, PROAIR HFA) 90 mcg/actuation inhaler Take 1 puff by inhalation every four (4) hours as needed for Wheezing. 1 Inhaler 1  ciclopirox (PENLAC) 8 % solution APPLY TO AFFECTED AREA TWICE DAILY EXTERNALLY AS DIRECTED. 6.6 mL 11 Allergies Allergen Reactions  Zofran [Ondansetron Hcl] Itching Arm turned red & itched  No Known Allergies Hives Past Medical History:  
Diagnosis Date  Acute pharyngitis 9/29/2014  Agitation requiring sedation protocol 4/11/2018  Bronchitis, acute 12/12/2013  Cellulitis 7/26/2018  Cellulitis of groin 3/23/2016  Chronic rhinitis 4/11/2018  Contact dermatitis and other eczema, due to unspecified cause  Decrease in appetite 12/12/2013  Diabetes ([facility] [address]  Dysphagia 10/22/2014  Fall at nursing home 7/24/2017  Fever in adult 9/29/2014  Hypertension  Inguinal bulge 4/11/2018  Mental retardation  Prediabetes 10/2/2014  Screening for glaucoma 12/3/2014  Seizure ([facility] [address] 11/27/2017  Stool incontinence 4/11/2018 Past Surgical History:  
Procedure Laterality Date  HX HEENT    
 dental surg. June 7, 2010 Family History Family history unknown: Yes  
 
Social History Tobacco Use  Smoking status: Never Smoker  Smokeless tobacco: Never Used Substance Use Topics  Alcohol use: No  
  
Lab Results Component Value Date/Time WBC 5.0 11/08/2018 02:12 PM  
 HGB 12.6 (L) 11/08/2018 02:12 PM  
 HCT 38.7 11/08/2018 02:12 PM  
 PLATELET 711 38/87/9208 02:12 PM  
 MCV 91 11/08/2018 02:12 PM  
 
Lab Results Component Value Date/Time GFR est non- 11/08/2018 02:12 PM  
 GFR est  11/08/2018 02:12 PM  
 Creatinine 0.82 11/08/2018 02:12 PM  
 BUN 13 11/08/2018 02:12 PM  
 Sodium 142 11/08/2018 02:12 PM  
 Potassium 4.4 11/08/2018 02:12 PM  
 Chloride 102 11/08/2018 02:12 PM  
 CO2 27 11/08/2018 02:12 PM  
  
Review of Systems Constitutional: Negative for chills and fever. HENT: Negative for ear pain and nosebleeds. Eyes: Negative for blurred vision, pain and discharge. Respiratory: Negative for shortness of breath. Cardiovascular: Negative for chest pain and leg swelling. Gastrointestinal: Negative for constipation, diarrhea, nausea and vomiting. Genitourinary: Negative for frequency. Musculoskeletal: Negative for joint pain. Skin: Negative for itching and rash. Neurological: Negative for headaches. Psychiatric/Behavioral: Negative for depression. The patient is not nervous/anxious. Physical Exam  
Constitutional: He is oriented to person, place, and time. He appears well-developed and well-nourished. HENT:  
Head: Normocephalic and atraumatic. Mouth/Throat: No oropharyngeal exudate. Eyes: Conjunctivae and EOM are normal.  
Neck: Normal range of motion. Neck supple. Cardiovascular: Normal rate, regular rhythm and normal heart sounds. No murmur heard. Pulmonary/Chest: Effort normal and breath sounds normal. No respiratory distress. Abdominal: Soft. Bowel sounds are normal. He exhibits no distension. There is no rebound. Musculoskeletal: He exhibits no edema or tenderness. Neurological: He is alert and oriented to person, place, and time. Skin: Skin is warm. No erythema. Psychiatric: He has a normal mood and affect. His behavior is normal.  
Nursing note and vitals reviewed. ASSESSMENT and PLAN Diagnoses and all orders for this visit: 1.  Essential hypertension 
-     cloNIDine HCl (CATAPRES) 0.2 mg tablet; TAKE ONE TABLET BY MOUTH AT BEDTIME  OK TO CRUSH AND GIVE MEDS 2. Seizure ([address] -     cloNIDine HCl (CATAPRES) 0.2 mg tablet; TAKE ONE TABLET BY MOUTH AT BEDTIME  OK TO CRUSH AND GIVE MEDS 3. Platelets decreased ([address] 4. Encounter for immunization HTN not controlled, will change of bp meds at this time,  Discussed sodium restriction, high k rich diet,  maintaining ideal body weight and regular exercise program 30 min per day 4-5 times per week,  medication compliance advised, was told to call back for rfs or of any concern

## 2019-01-09 NOTE — PROGRESS NOTES
Name and  verified/caregiver Chief Complaint Patient presents with  Hypertension f/u Health Maintenance reviewed-discussed with patient. 1. Have you been to the ER, urgent care clinic since your last visit? Hospitalized since your last visit?no 2. Have you seen or consulted any other health care providers outside of the [address] [place] [address] since your last visit? Include any pap smears or colon screening.  no

## 2019-02-11 DIAGNOSIS — D69.6 PLATELETS DECREASED (HCC): ICD-10-CM

## 2019-02-11 DIAGNOSIS — R56.9 SEIZURE (HCC): ICD-10-CM

## 2019-02-11 DIAGNOSIS — Z23 ENCOUNTER FOR IMMUNIZATION: ICD-10-CM

## 2019-02-11 RX ORDER — POTASSIUM CHLORIDE 750 MG/1
TABLET, FILM COATED, EXTENDED RELEASE ORAL
Qty: 28 TAB | Status: SHIPPED | OUTPATIENT
Start: 2019-02-11 | End: 2021-07-16 | Stop reason: SDUPTHER

## 2019-02-13 ENCOUNTER — OFFICE VISIT (OUTPATIENT)
Dept: FAMILY MEDICINE CLINIC | Age: 44
End: 2019-02-13

## 2019-02-13 VITALS
BODY MASS INDEX: 23.13 KG/M2 | SYSTOLIC BLOOD PRESSURE: 181 MMHG | RESPIRATION RATE: 22 BRPM | HEART RATE: 70 BPM | TEMPERATURE: 95.2 F | WEIGHT: 135.5 LBS | HEIGHT: 64 IN | DIASTOLIC BLOOD PRESSURE: 111 MMHG

## 2019-02-13 DIAGNOSIS — Z71.89 ADVANCED DIRECTIVES, COUNSELING/DISCUSSION: ICD-10-CM

## 2019-02-13 DIAGNOSIS — R56.9 SEIZURE (HCC): ICD-10-CM

## 2019-02-13 DIAGNOSIS — Z13.31 SCREENING FOR DEPRESSION: ICD-10-CM

## 2019-02-13 DIAGNOSIS — Z00.00 MEDICARE ANNUAL WELLNESS VISIT, SUBSEQUENT: Primary | ICD-10-CM

## 2019-02-13 DIAGNOSIS — I10 ESSENTIAL HYPERTENSION: ICD-10-CM

## 2019-02-13 DIAGNOSIS — Z13.39 SCREENING FOR ALCOHOLISM: ICD-10-CM

## 2019-02-13 DIAGNOSIS — I10 HTN, GOAL BELOW 140/90: ICD-10-CM

## 2019-02-13 DIAGNOSIS — D69.6 PLATELETS DECREASED (HCC): ICD-10-CM

## 2019-02-13 DIAGNOSIS — Z23 ENCOUNTER FOR IMMUNIZATION: ICD-10-CM

## 2019-02-13 RX ORDER — NEBIVOLOL 10 MG/1
TABLET ORAL
Qty: 60 TAB | Refills: 6 | Status: SHIPPED | OUTPATIENT
Start: 2019-02-13 | End: 2019-04-08 | Stop reason: SDUPTHER

## 2019-02-13 RX ORDER — CLONIDINE HYDROCHLORIDE 0.2 MG/1
TABLET ORAL
Qty: 90 TAB | Refills: 2 | Status: SHIPPED | OUTPATIENT
Start: 2019-02-13 | End: 2019-04-08 | Stop reason: SDUPTHER

## 2019-02-13 RX ORDER — LISINOPRIL 40 MG/1
40 TABLET ORAL DAILY
Qty: 90 TAB | Refills: 1 | Status: SHIPPED | OUTPATIENT
Start: 2019-02-13 | End: 2019-03-15 | Stop reason: SDUPTHER

## 2019-02-13 NOTE — PATIENT INSTRUCTIONS
Medicare Wellness Visit, Male The best way to live healthy is to have a lifestyle where you eat a well-balanced diet, exercise regularly, limit alcohol use, and quit all forms of tobacco/nicotine, if applicable. Regular preventive services are another way to keep healthy. Preventive services (vaccines, screening tests, monitoring & exams) can help personalize your care plan, which helps you manage your own care. Screening tests can find health problems at the earliest stages, when they are easiest to treat. [facility] follows the current, evidence-based guidelines published by the [facility] ([facility]) when recommending preventive services for our patients. Because we follow these guidelines, sometimes recommendations change over time as research supports it. (For example, a prostate screening blood test is no longer routinely recommended for men with no symptoms.) Of course, you and your doctor may decide to screen more often for some diseases, based on your risk and co-morbidities (chronic disease you are already diagnosed with). Preventive services for you include: - Medicare offers their members a free annual wellness visit, which is time for you and your primary care provider to discuss and plan for your preventive service needs. Take advantage of this benefit every year! 
-All adults over age 72 should receive the recommended pneumonia vaccines. Current USPSTF guidelines recommend a series of two vaccines for the best pneumonia protection.  
-All adults should have a flu vaccine yearly and an ECG.  All adults age 61 and older should receive a shingles vaccine once in their lifetime.   
-All adults age 38-68 who are overweight should have a diabetes screening test once every three years.  
-Other screening tests & preventive services for persons with diabetes include: an eye exam to screen for diabetic retinopathy, a kidney function test, a foot exam, and stricter control over your cholesterol.  
-Cardiovascular screening for adults with routine risk involves an electrocardiogram (ECG) at intervals determined by the provider.  
-Colorectal cancer screening should be done for adults age 54-65 with no increased risk factors for colorectal cancer. There are a number of acceptable methods of screening for this type of cancer. Each test has its own benefits and drawbacks. Discuss with your provider what is most appropriate for you during your annual wellness visit. The different tests include: colonoscopy (considered the best screening method), a fecal occult blood test, a fecal DNA test, and sigmoidoscopy. 
-All adults born between [facility] should be screened once for Hepatitis C. 
-An Abdominal Aortic Aneurysm (AAA) Screening is recommended for men age 73-68 who has ever smoked in their lifetime. Here is a list of your current Health Maintenance items (your personalized list of preventive services) with a due date: 
Health Maintenance Due Topic Date Due  
 Diabetic Foot Care  03/23/2017 [facility] Eye Exam  07/09/2017  Albumin Urine Test  07/25/2018  Hemoglobin A1C    09/28/2018 [facility] Annual Well Visit  10/29/2018

## 2019-02-13 NOTE — ACP (ADVANCE CARE PLANNING)
Advance Care Planning  Other Legally Authorized Decision Maker would be brother Maya Nephray, Other Relative [phone] [facility])     as SDM     For My patients who has currently great Decision Making Capacity:     Patient is on ++ presence of family member stated that he wants to be not DNR at this time,  he likes to be a full code individual,   Pt was given the form, he will sign and bring us a copy on the later date.

## 2019-02-13 NOTE — PROGRESS NOTES
Name and  verified Chief Complaint Patient presents with  Hypertension Health Maintenance reviewed-discussed with patient. 1. Have you been to the ER, urgent care clinic since your last visit? Hospitalized since your last visit? no 
 
2. Have you seen or consulted any other health care providers outside of the [address] [place] [address] since your last visit? Include any pap smears or colon screening.  no

## 2019-02-13 NOTE — PROGRESS NOTES
This is the Subsequent Medicare Annual Wellness Exam, performed 12 months or more after the Initial AWV or the last Subsequent AWV I have reviewed the patient's medical history in detail and updated the computerized patient record. History Goran Hinton is a 43y. o. male presenting for his annual checkup. 
  
Present for CPE, last Complete Physical exam was yrly in 2017 , He is Up todate w/ all vaccination,he has MR presented with case provider, no family member present with him at him time, Last psa exam was never,  
last colonoscopy was never, No past surgical hx, 
last bone dexa scan was never,  
unaware family hx of prostate cancer Unaware family hx of colon cancer, parent unknown, not sexaully active , not physically active, 
compliant w/ meds, +Rf needed for today for his meds.  
  
Ros is as per the assisted living  
  
  
ROS:  Feeling well. No dyspnea or chest pain on exertion. No abdominal pain, change in bowel habits, black or bloody stools. No urinary tract or prostatic symptoms. No neurological complaints. 
  
Specific concerns today: his bp check and has few diff forms needs to be completed for his Assisted living, Today pt present for Bp check and the patient stating that so far there has been a Compliancy w/ the bp meds,he is not having had the low salt diet   the patient is strapped to the w/c,  Has not been active secondary to the mobility disorder, and multiple falls while mobile none recent,  Patient's bp has been checked at assisted living few times a week and the average of some very nl 120/70 and some elevated moslty when pt not cooperative , today the pt denies Chest Pain, has no legs swelling no lightheadedness, Past Medical History:  
Diagnosis Date  Acute pharyngitis 9/29/2014  Agitation requiring sedation protocol 4/11/2018  Bronchitis, acute 12/12/2013  Cellulitis 7/26/2018  Cellulitis of groin 3/23/2016  Chronic rhinitis 4/11/2018  Contact dermatitis and other eczema, due to unspecified cause  Decrease in appetite 12/12/2013  Diabetes [address] [address]  Dysphagia 10/22/2014  Fall at nursing home 7/24/2017  Fever in adult 9/29/2014  Hypertension  Inguinal bulge 4/11/2018  Mental retardation  Prediabetes 10/2/2014  Screening for glaucoma 12/3/2014  Seizure ([facility] [address] 11/27/2017  Stool incontinence 4/11/2018 Past Surgical History:  
Procedure Laterality Date  HX HEENT    
 dental surg. June 7, 2010 Current Outpatient Medications Medication Sig Dispense Refill  potassium chloride SR (KLOR-CON 10) 10 mEq tablet TAKE 1 TABLET BY MOUTH DAILY DO NOT HU MUST SWALLOW WHOLE 28 Tab PRN  
 cloNIDine HCl (CATAPRES) 0.2 mg tablet TAKE ONE TABLET BY MOUTH AT BEDTIME  OK TO CRUSH AND GIVE MEDS 30 Tab 6  polyethylene glycol (MIRALAX) 17 gram packet Take 1 Packet by mouth daily. Hold for loose stool (Patient taking differently: Take 17 g by mouth daily as needed. Hold for loose stool) 30 Each 12  
 nebivolol (BYSTOLIC) 10 mg tablet TAKE 1 TABLET BY MOUTH TWICE A DAY  OK TO CRUSH AND GIVE MEDS 60 Tab 6  
 montelukast (SINGULAIR) 10 mg tablet Take 1 Tab by mouth daily. 30 Tab 6  cetirizine (ZYRTEC) 10 mg tablet TAKE 1 TABLET BY MOUTH DAILY FOR ALLERGIES ** OK TO CRUSH AND GIVE MEDS** 30 Tab PRN  
 omeprazole (PRILOSEC) 40 mg capsule TAKE 1 CAPSULE BY MOUTH EACH MORNING 30 MINUTES BEFORE BREAKFAST **OKTO OPEN CAPSULE AND GIVE CONTENTS** 30 Cap PRN  
 lisinopril (PRINIVIL, ZESTRIL) 40 mg tablet Take 1 Tab by mouth daily. 90 Tab 1  
 acetaminophen (TYLENOL) 325 mg tablet Take  by mouth every four (4) hours as needed for Pain.  DOC-Q-LACE 100 mg capsule TAKE (1) CAPSULE BY MOUTH TWICE DAILY (Patient taking differently: TAKE (1) CAPSULE BY MOUTH TWICE DAILY AS NEEDED) 60 Cap PRN  
 fluticasone (FLONASE) 50 mcg/actuation nasal spray BLOW NOSE: 2 SPRAYS IN EACH NOSTRIL DAILY FOR ALLERGY.  16 g 11  
  OTHER Pardeep's baby shampoo. use as lid scrub to each eye once daily  ciclopirox (PENLAC) 8 % solution APPLY TO AFFECTED AREA TWICE DAILY EXTERNALLY AS DIRECTED. 6.6 mL 11  
 Miscellaneous Medical Supply (OCUSOFT EYELID CLEANSING PADS) pads by Does Not Apply route.  alcaftadine (LASTACAFT) 0.25 % drop Apply  to eye.  albuterol (PROVENTIL HFA, VENTOLIN HFA, PROAIR HFA) 90 mcg/actuation inhaler Take 1 puff by inhalation every four (4) hours as needed for Wheezing. 1 Inhaler 1 Allergies Allergen Reactions  Zofran [Ondansetron Hcl] Itching Arm turned red & itched  No Known Allergies Hives Family History Family history unknown: Yes  
 
Social History Tobacco Use  Smoking status: Never Smoker  Smokeless tobacco: Never Used Substance Use Topics  Alcohol use: No  
 
Patient Active Problem List  
Diagnosis Code  
 HTN (hypertension) I10  
 Environmental allergies Z91.09  
 Hypokalemia E87.6  Elevated BUN R79.9  Dyslipidemia E78.5  Profound mental retardation in adult F73  Torsion, testicular N44.00  
 HTN, goal below 130/80 I10  PPD screening test Z11.1  Otitis media H66.90  
 Decrease in appetite R63.0  Fever and chills R50.9  Bronchitis, acute J20.9  Platelets decreased ([address] D69.6  Injury of elbow, right S59.901A  Fever in adult R50.9  Acute pharyngitis J02.9  Prediabetes R73.03  
 Dysphagia R13.10  Screening for glaucoma Z13.5  Cellulitis of groin L03.314  
 Fall at nursing home W19. Jones Robinson  
 Seizure ([address] R56.9  Inguinal bulge R19.09  
 Chronic rhinitis J31.0  Agitation requiring sedation protocol R45.1  Stool incontinence R15.9  Functional diarrhea K59.1  Cellulitis L03.90 Depression Risk Factor Screening:  
 
3 most recent PHQ Screens 1/9/2019 Little interest or pleasure in doing things Not at all Feeling down, depressed, irritable, or hopeless Not at all Total Score PHQ 2 0 Alcohol Risk Factor Screening: You do not drink alcohol or very rarely. Functional Ability and Level of Safety:  
Hearing Loss Hearing is good. Activities of Daily Living The home contains: handrails, grab bars and rugs Patient needs help with:  phone, transportation, shopping, preparing meals, laundry, housework, managing medications, managing money, eating, dressing, bathing, hygiene, bathroom needs and walking Fall Risk No flowsheet data found. Abuse Screen Patient is not abused Cognitive Screening Evaluation of Cognitive Function: 
Has your family/caregiver stated any concerns about your memory: yes Normal, Abnormal 
 
Patient Care Team  
Patient Care Team: 
Rudy Qiu MD as PCP - General (Family Practice) Rosemary Orellana RN as Nurse Navigator Marycruz Jiménez MD as Physician (Cardiology) Assessment/Plan Education and counseling provided: 
Are appropriate based on today's review and evaluation End-of-Life planning (with patient's consent) Diagnoses and all orders for this visit: 
 
1. Medicare annual wellness visit, subsequent 2. Seizure ([address] -     cloNIDine HCl (CATAPRES) 0.2 mg tablet; TAKE ONE TABLET BY MOUTH AT BEDTIME  OK TO CRUSH AND GIVE MEDS Hold bp meds if systolic bp <57,  if diastolic bp <25, and if HR <60bpm, call MD if systolic BP > 409,  if diastolic bp >98 and if HR >100 bpm 
-     nebivolol (BYSTOLIC) 10 mg tablet; TAKE 1 TABLET BY MOUTH TWICE A DAY  OK TO CRUSH AND GIVE MEDS, Hold bp meds if systolic bp <71,  if diastolic bp <40, and if HR <60bpm, call MD if systolic BP > 609,  if diastolic bp >98 and if HR >100 bpm 
-     CBC W/O DIFF 
-     METABOLIC PANEL, COMPREHENSIVE 
-     TSH 3RD GENERATION 3. HTN, goal below 140/90 
-     lisinopril (PRINIVIL, ZESTRIL) 40 mg tablet; Take 1 Tab by mouth daily.  Hold bp meds if systolic bp <69,  if diastolic bp <00, and if HR <60bpm, call MD if systolic BP > 134,  if diastolic bp >05 and if HR >100 bpm 
-     CBC W/O DIFF 
-     METABOLIC PANEL, COMPREHENSIVE 
-     TSH 3RD GENERATION 
-     AMB POC URINALYSIS DIP STICK AUTO W/O MICRO; Future 4. Advanced directives, counseling/discussion -     ADVANCE CARE PLANNING FIRST 30 MINS 5. Screening for alcoholism -     [place] ANNUAL ALCOHOL SCREEN 15 MIN 6. Screening for depression 
-     [place] [address] 7. Essential hypertension 
-     cloNIDine HCl (CATAPRES) 0.2 mg tablet; TAKE ONE TABLET BY MOUTH AT BEDTIME  OK TO CRUSH AND GIVE MEDS Hold bp meds if systolic bp <77,  if diastolic bp <24, and if HR <60bpm, call MD if systolic BP > 052,  if diastolic bp >76 and if HR >100 bpm 
-     CBC W/O DIFF 
-     METABOLIC PANEL, COMPREHENSIVE 
-     TSH 3RD GENERATION 8. Platelets decreased (HCC) 
-     nebivolol (BYSTOLIC) 10 mg tablet; TAKE 1 TABLET BY MOUTH TWICE A DAY  OK TO CRUSH AND GIVE MEDS, Hold bp meds if systolic bp <65,  if diastolic bp <39, and if HR <60bpm, call MD if systolic BP > 013,  if diastolic bp >54 and if HR >100 bpm 
-     CBC W/O DIFF 
-     METABOLIC PANEL, COMPREHENSIVE 
-     TSH 3RD GENERATION 9. Encounter for immunization 
-     nebivolol (BYSTOLIC) 10 mg tablet; TAKE 1 TABLET BY MOUTH TWICE A DAY  OK TO CRUSH AND GIVE MEDS, Hold bp meds if systolic bp <98,  if diastolic bp <15, and if HR <60bpm, call MD if systolic BP > 752,  if diastolic bp >37 and if HR >100 bpm 
 
 
 
[facility] education and counseling provided: 
Age appropriate evidence-based preventive care recommendations based on today's review and evaluation; including relevant cancer screening guidelines, and vaccination recommendations. An After Visit Summary was printed and given to the patient with information about these guidelines, and a personalized schedule for health maintenance items.  When appropriate and with patient agreement, orders noted below were placed to complete missing health maintenance items. Health Maintenance Due Topic Date Due  
 FOOT EXAM Q1  03/23/2017  
 EYE EXAM RETINAL OR DILATED  07/09/2017  MICROALBUMIN Q1  07/25/2018  HEMOGLOBIN A1C Q6M  09/28/2018  MEDICARE YEARLY EXAM  10/29/2018

## 2019-02-14 LAB
ALBUMIN SERPL-MCNC: 3.9 G/DL (ref 3.5–5.5)
ALBUMIN/GLOB SERPL: 1.3 {RATIO} (ref 1.2–2.2)
ALP SERPL-CCNC: 68 IU/L (ref 39–117)
ALT SERPL-CCNC: 31 IU/L (ref 0–44)
AST SERPL-CCNC: 13 IU/L (ref 0–40)
BILIRUB SERPL-MCNC: <0.2 MG/DL (ref 0–1.2)
BUN SERPL-MCNC: 18 MG/DL (ref 6–24)
BUN/CREAT SERPL: 18 (ref 9–20)
CALCIUM SERPL-MCNC: 9.1 MG/DL (ref 8.7–10.2)
CHLORIDE SERPL-SCNC: 102 MMOL/L (ref 96–106)
CO2 SERPL-SCNC: 24 MMOL/L (ref 20–29)
CREAT SERPL-MCNC: 1 MG/DL (ref 0.76–1.27)
ERYTHROCYTE [DISTWIDTH] IN BLOOD BY AUTOMATED COUNT: 15.7 % (ref 12.3–15.4)
GLOBULIN SER CALC-MCNC: 3.1 G/DL (ref 1.5–4.5)
GLUCOSE SERPL-MCNC: 140 MG/DL (ref 65–99)
HCT VFR BLD AUTO: 37.6 % (ref 37.5–51)
HGB BLD-MCNC: 12.4 G/DL (ref 13–17.7)
MCH RBC QN AUTO: 30.1 PG (ref 26.6–33)
MCHC RBC AUTO-ENTMCNC: 33 G/DL (ref 31.5–35.7)
MCV RBC AUTO: 91 FL (ref 79–97)
PLATELET # BLD AUTO: 133 X10E3/UL (ref 150–379)
POTASSIUM SERPL-SCNC: 4 MMOL/L (ref 3.5–5.2)
PROT SERPL-MCNC: 7 G/DL (ref 6–8.5)
RBC # BLD AUTO: 4.12 X10E6/UL (ref 4.14–5.8)
SODIUM SERPL-SCNC: 142 MMOL/L (ref 134–144)
TSH SERPL DL<=0.005 MIU/L-ACNC: 1.56 UIU/ML (ref 0.45–4.5)
WBC # BLD AUTO: 3.8 X10E3/UL (ref 3.4–10.8)

## 2019-03-15 DIAGNOSIS — I10 HTN, GOAL BELOW 140/90: ICD-10-CM

## 2019-03-15 NOTE — TELEPHONE ENCOUNTER
Last Visit: 2/13  Next Appt: 4/8  Previous Refill Encounter: 2/13-Never sent b/c of the sig (says \" no print\")    Requested Prescriptions     Pending Prescriptions Disp Refills    lisinopril (PRINIVIL, ZESTRIL) 40 mg tablet 90 Tab 1     Sig: Take 1 tab daily, Hold meds if systolic RS<84,JT diastolic UF<47,& if VF<81HSL,DHFI MD if systolic UC>327,KD diastolic CJ>85 & if DA>761 bpm

## 2019-03-18 RX ORDER — LISINOPRIL 40 MG/1
TABLET ORAL
Qty: 90 TAB | Refills: 1 | Status: SHIPPED | OUTPATIENT
Start: 2019-03-18 | End: 2019-04-08 | Stop reason: SDUPTHER

## 2019-03-25 ENCOUNTER — DOCUMENTATION ONLY (OUTPATIENT)
Dept: INTERNAL MEDICINE CLINIC | Age: 44
End: 2019-03-25

## 2019-04-08 ENCOUNTER — OFFICE VISIT (OUTPATIENT)
Dept: FAMILY MEDICINE CLINIC | Age: 44
End: 2019-04-08

## 2019-04-08 VITALS
WEIGHT: 135 LBS | RESPIRATION RATE: 20 BRPM | BODY MASS INDEX: 23.05 KG/M2 | HEIGHT: 64 IN | DIASTOLIC BLOOD PRESSURE: 128 MMHG | SYSTOLIC BLOOD PRESSURE: 195 MMHG | HEART RATE: 67 BPM | TEMPERATURE: 96.4 F

## 2019-04-08 DIAGNOSIS — R56.9 SEIZURE (HCC): ICD-10-CM

## 2019-04-08 DIAGNOSIS — B37.9 CANDIDA INFECTION: ICD-10-CM

## 2019-04-08 DIAGNOSIS — Z23 ENCOUNTER FOR IMMUNIZATION: ICD-10-CM

## 2019-04-08 DIAGNOSIS — I10 HTN, GOAL BELOW 140/90: Primary | ICD-10-CM

## 2019-04-08 DIAGNOSIS — K11.7 DROOLING: ICD-10-CM

## 2019-04-08 RX ORDER — NEBIVOLOL 10 MG/1
TABLET ORAL
Qty: 60 TAB | Refills: 6 | Status: SHIPPED | OUTPATIENT
Start: 2019-04-08

## 2019-04-08 RX ORDER — CLONIDINE HYDROCHLORIDE 0.2 MG/1
TABLET ORAL
Qty: 90 TAB | Refills: 2 | Status: SHIPPED | OUTPATIENT
Start: 2019-04-08

## 2019-04-08 RX ORDER — LISINOPRIL 40 MG/1
TABLET ORAL
Qty: 90 TAB | Refills: 1 | Status: SHIPPED | OUTPATIENT
Start: 2019-04-08

## 2019-04-08 RX ORDER — FLUCONAZOLE 150 MG/1
150 TABLET ORAL DAILY
Qty: 3 TAB | Refills: 0 | Status: SHIPPED | OUTPATIENT
Start: 2019-04-08 | End: 2019-04-11

## 2019-04-08 NOTE — PROGRESS NOTES
HISTORY OF PRESENT ILLNESS Diana Parr is a 37 y.o. male. HPI Patient with severe mental retardation present with a nurse care provider living in assisted living as per the provider patient is tolerating the puréed diet with drooling unable to have good control patient able to chew but have difficulty swallowing no recent aspiration denies any cough in addition patient was treated for urinary tract infection on the last visit with ambiguous genitalia small penis and the pouch with foul odor and itchiness patient denies any discharge, he is also incontinence is DrFlori denies any foul odor In addition patient with history of elevated blood pressure in office setting secondary to being not cooperative patient's case provider has a copy of 1 months blood pressure reading unfortunately patient blood pressure is significantly elevated at this clinic secondary to the patient being challenging every time blood pressure need to be obtained he struggles and become agitated and irritated regardless, Today pt present for Bp check and++Compliancy w/ the bp meds, having had the low salt diet ,  hasnot been active, patien does obtain the bp at home /, today the pt denies Chest Pain, has no legs swelling no lightheadedness, 
 
 
 
Current Outpatient Medications Medication Sig Dispense Refill  lisinopril (PRINIVIL, ZESTRIL) 40 mg tablet Take 1 tab daily, Hold meds if systolic AUGUSTUS<97,HORN diastolic VG<54,& if PX<98IQI,BJML MD if systolic DI>799,LZ diastolic XZ>13 & if PT>535 bpm 90 Tab 1  cloNIDine HCl (CATAPRES) 0.2 mg tablet TAKE ONE TABLET BY MOUTH AT BEDTIME  OK TO CRUSH AND GIVE MEDS Hold bp meds if systolic bp <55,  if diastolic bp <83, and if HR <60bpm, call MD if systolic BP > 577,  if diastolic bp >81 and if HR >100 bpm 90 Tab 2  
 nebivolol (BYSTOLIC) 10 mg tablet TAKE 1 TABLET BY MOUTH TWICE A DAY  OK TO CRUSH AND GIVE MEDS, Hold bp meds if systolic bp <32,  if diastolic bp <79, and if HR <60bpm, call MD if systolic BP > 111,  if diastolic bp >64 and if HR >100 bpm 60 Tab 6  potassium chloride SR (KLOR-CON 10) 10 mEq tablet TAKE 1 TABLET BY MOUTH DAILY DO NOT HU MUST SWALLOW WHOLE 28 Tab PRN  polyethylene glycol (MIRALAX) 17 gram packet Take 1 Packet by mouth daily. Hold for loose stool 30 Each 12  
 montelukast (SINGULAIR) 10 mg tablet Take 1 Tab by mouth daily. 30 Tab 6  cetirizine (ZYRTEC) 10 mg tablet TAKE 1 TABLET BY MOUTH DAILY FOR ALLERGIES ** OK TO CRUSH AND GIVE MEDS** 30 Tab PRN  
 omeprazole (PRILOSEC) 40 mg capsule TAKE 1 CAPSULE BY MOUTH EACH MORNING 30 MINUTES BEFORE BREAKFAST **OKTO OPEN CAPSULE AND GIVE CONTENTS** 30 Cap PRN  
 acetaminophen (TYLENOL) 325 mg tablet Take  by mouth every four (4) hours as needed for Pain.  DOC-Q-LACE 100 mg capsule TAKE (1) CAPSULE BY MOUTH TWICE DAILY (Patient taking differently: TAKE (1) CAPSULE BY MOUTH TWICE DAILY AS NEEDED) 60 Cap PRN  
 fluticasone (FLONASE) 50 mcg/actuation nasal spray BLOW NOSE: 2 SPRAYS IN EACH NOSTRIL DAILY FOR ALLERGY. 16 g 11  
 OTHER Pardeep's baby shampoo. use as lid scrub to each eye once daily  ciclopirox (PENLAC) 8 % solution APPLY TO AFFECTED AREA TWICE DAILY EXTERNALLY AS DIRECTED. 6.6 mL 11  
 Miscellaneous Medical Supply (OCUSOFT EYELID CLEANSING PADS) pads by Does Not Apply route.  alcaftadine (LASTACAFT) 0.25 % drop Apply  to eye.  albuterol (PROVENTIL HFA, VENTOLIN HFA, PROAIR HFA) 90 mcg/actuation inhaler Take 1 puff by inhalation every four (4) hours as needed for Wheezing. 1 Inhaler 1 Allergies Allergen Reactions  Zofran [Ondansetron Hcl] Itching Arm turned red & itched  No Known Allergies Hives Past Medical History:  
Diagnosis Date  Acute pharyngitis 9/29/2014  Agitation requiring sedation protocol 4/11/2018  Bronchitis, acute 12/12/2013  Cellulitis 7/26/2018  Cellulitis of groin 3/23/2016  Chronic rhinitis 4/11/2018  Contact dermatitis and other eczema, due to unspecified cause  Decrease in appetite 12/12/2013  Diabetes ([facility] [address]  Dysphagia 10/22/2014  Fall at nursing home 7/24/2017  Fever in adult 9/29/2014  Hypertension  Inguinal bulge 4/11/2018  Mental retardation  Prediabetes 10/2/2014  Screening for glaucoma 12/3/2014  Seizure ([facility] [address] 11/27/2017  Stool incontinence 4/11/2018 Past Surgical History:  
Procedure Laterality Date  HX HEENT    
 dental surg. June 7, 2010 Family History Family history unknown: Yes  
 
Social History Tobacco Use  Smoking status: Never Smoker  Smokeless tobacco: Never Used Substance Use Topics  Alcohol use: No  
  
Lab Results Component Value Date/Time WBC 3.8 02/13/2019 01:45 PM  
 HGB 12.4 (L) 02/13/2019 01:45 PM  
 HCT 37.6 02/13/2019 01:45 PM  
 PLATELET 140 (L) 29/76/5995 01:45 PM  
 MCV 91 02/13/2019 01:45 PM  
 
Lab Results Component Value Date/Time TSH 1.560 02/13/2019 01:45 PM  
   
Review of Systems Constitutional: Negative for chills and fever. HENT: Negative for ear pain and nosebleeds. Eyes: Negative for blurred vision, pain and discharge. Respiratory: Negative for shortness of breath. Cardiovascular: Negative for chest pain and leg swelling. Gastrointestinal: Negative for constipation, diarrhea, nausea and vomiting. Genitourinary: Negative for frequency. Musculoskeletal: Negative for joint pain. Skin: Negative for itching and rash. Neurological: Negative for headaches. Psychiatric/Behavioral: Negative for depression. The patient is not nervous/anxious. Physical Exam  
Constitutional: He is oriented to person, place, and time. He appears well-developed and well-nourished. HENT:  
Head: Normocephalic and atraumatic. Mouth/Throat: No oropharyngeal exudate. Eyes: Conjunctivae and EOM are normal.  
Neck: Normal range of motion. Neck supple. Cardiovascular: Normal rate, regular rhythm and normal heart sounds. No murmur heard. Pulmonary/Chest: Effort normal and breath sounds normal. No respiratory distress. Abdominal: Soft. Bowel sounds are normal. He exhibits no distension. There is no rebound. Musculoskeletal: He exhibits no edema or tenderness. Neurological: He is alert and oriented to person, place, and time. Skin: Skin is warm. No erythema. Psychiatric: He has a normal mood and affect. His behavior is normal.  
Nursing note and vitals reviewed. ASSESSMENT and PLAN Diagnoses and all orders for this visit: 1. HTN, goal below 140/90 
-     lisinopril (PRINIVIL, ZESTRIL) 40 mg tablet; Take 1 tab daily, Hold meds if systolic EX<17,FC diastolic TJ<23,& if ZW<[id number],LGYL MD if systolic SS>258,EB diastolic HC>90 & if XF>667 bpm 
 
2. Candida infection 
-     fluconazole (DIFLUCAN) 150 mg tablet; Take 1 Tab by mouth daily for 3 days. FDA advises cautious prescribing of oral fluconazole in pregnancy. 3. Drooling 
-     REFERRAL TO SPEECH THERAPY 4. Seizure (HCC) 
-     REFERRAL TO SPEECH THERAPY 
-     cloNIDine HCl (CATAPRES) 0.2 mg tablet; TAKE ONE TABLET BY MOUTH AT BEDTIME  OK TO CRUSH AND GIVE MEDS Hold bp meds if systolic bp <06,  if diastolic bp <10, and if HR <60bpm, call MD if systolic BP > 023,  if diastolic bp >19 and if HR >100 bpm  Indications: Attention Deficit Disorder with Hyperactivity, high blood pressure 
-     nebivolol (BYSTOLIC) 10 mg tablet; TAKE 1 TABLET BY MOUTH TWICE A DAY  OK TO CRUSH AND GIVE MEDS, Hold bp meds if systolic bp <73,  if diastolic bp <49, and if HR <60bpm, call MD if systolic BP > 955,  if diastolic bp >91 and if HR >100 bpm 
 
5. Encounter for immunization

## 2019-04-08 NOTE — PROGRESS NOTES
Name and  verified Chief Complaint Patient presents with  Hypertension Follow up Health Maintenance reviewed-discussed with patient. 1. Have you been to the ER, urgent care clinic since your last visit? Hospitalized since your last visit? no 
 
2. Have you seen or consulted any other health care providers outside of the [address] [place] [address] since your last visit? Include any pap smears or colon screening.  no

## 2019-04-08 NOTE — PATIENT INSTRUCTIONS
DASH Diet: Care Instructions Your Care Instructions The DASH diet is an eating plan that can help lower your blood pressure. DASH stands for Dietary Approaches to Stop Hypertension. Hypertension is high blood pressure. The DASH diet focuses on eating foods that are high in calcium, potassium, and magnesium. These nutrients can lower blood pressure. The foods that are highest in these nutrients are fruits, vegetables, low-fat dairy products, nuts, seeds, and legumes. But taking calcium, potassium, and magnesium supplements instead of eating foods that are high in those nutrients does not have the same effect. The DASH diet also includes whole grains, fish, and poultry. The DASH diet is one of several lifestyle changes your doctor may recommend to lower your high blood pressure. Your doctor may also want you to decrease the amount of sodium in your diet. Lowering sodium while following the DASH diet can lower blood pressure even further than just the DASH diet alone. Follow-up care is a key part of your treatment and safety. Be sure to make and go to all appointments, and call your doctor if you are having problems. It's also a good idea to know your test results and keep a list of the medicines you take. How can you care for yourself at home? Following the DASH diet · Eat 4 to 5 servings of fruit each day. A serving is 1 medium-sized piece of fruit, ½ cup chopped or canned fruit, 1/4 cup dried fruit, or 4 ounces (½ cup) of fruit juice. Choose fruit more often than fruit juice. · Eat 4 to 5 servings of vegetables each day. A serving is 1 cup of lettuce or raw leafy vegetables, ½ cup of chopped or cooked vegetables, or 4 ounces (½ cup) of vegetable juice. Choose vegetables more often than vegetable juice. · Get 2 to 3 servings of low-fat and fat-free dairy each day. A serving is 8 ounces of milk, 1 cup of yogurt, or 1 ½ ounces of cheese. · Eat 6 to 8 servings of grains each day. A serving is 1 slice of bread, 1 ounce of dry cereal, or ½ cup of cooked rice, pasta, or cooked cereal. Try to choose whole-grain products as much as possible. · Limit lean meat, poultry, and fish to 2 servings each day. A serving is 3 ounces, about the size of a deck of cards. · Eat 4 to 5 servings of nuts, seeds, and legumes (cooked dried beans, lentils, and split peas) each week. A serving is 1/3 cup of nuts, 2 tablespoons of seeds, or ½ cup of cooked beans or peas. · Limit fats and oils to 2 to 3 servings each day. A serving is 1 teaspoon of vegetable oil or 2 tablespoons of salad dressing. · Limit sweets and added sugars to 5 servings or less a week. A serving is 1 tablespoon jelly or jam, ½ cup sorbet, or 1 cup of lemonade. · Eat less than 2,300 milligrams (mg) of sodium a day. If you limit your sodium to 1,500 mg a day, you can lower your blood pressure even more. Tips for success · Start small. Do not try to make dramatic changes to your diet all at once. You might feel that you are missing out on your favorite foods and then be more likely to not follow the plan. Make small changes, and stick with them. Once those changes become habit, add a few more changes. · Try some of the following: ? Make it a goal to eat a fruit or vegetable at every meal and at snacks. This will make it easy to get the recommended amount of fruits and vegetables each day. ? Try yogurt topped with fruit and nuts for a snack or healthy dessert. ? Add lettuce, tomato, cucumber, and onion to sandwiches. ? Combine a ready-made pizza crust with low-fat mozzarella cheese and lots of vegetable toppings. Try using tomatoes, squash, spinach, broccoli, carrots, cauliflower, and onions. ? Have a variety of cut-up vegetables with a low-fat dip as an appetizer instead of chips and dip. ? Sprinkle sunflower seeds or chopped almonds over salads.  Or try adding chopped walnuts or almonds to cooked vegetables. ? Try some vegetarian meals using beans and peas. Add garbanzo or kidney beans to salads. Make burritos and tacos with mashed stewart beans or black beans. Where can you learn more? Go to http://marie-angi.info/. Enter A001 in the search box to learn more about \"DASH Diet: Care Instructions. \" Current as of: July 22, 2018 Content Version: 11.9 © 6278-7548 [employer]. Care instructions adapted under license by [employer] (which disclaims liability or warranty for this information). If you have questions about a medical condition or this instruction, always ask your healthcare professional. [address] any warranty or liability for your use of this information.

## 2019-04-09 NOTE — PROGRESS NOTES
Received call from Pat Cameron, cristina. Speech therapy does not complete barium swallow,  Must have an order for test placed. Order placed for modified barium swallow, per Verbal Order from Dr. Liliana Ruiz on 4/9/2019 due to increased drooling , on pureed diet

## 2019-04-17 ENCOUNTER — TELEPHONE (OUTPATIENT)
Dept: FAMILY MEDICINE CLINIC | Age: 44
End: 2019-04-17

## 2019-04-17 NOTE — TELEPHONE ENCOUNTER
Julien (PSR) isa stated caregiver called and has not had a bowel movement in seven days encouraged to visit Urgent care Center. He acknowledged understanding.

## 2019-04-19 ENCOUNTER — HOSPITAL ENCOUNTER (OUTPATIENT)
Dept: GENERAL RADIOLOGY | Age: 44
Discharge: HOME | End: 2019-04-19
Attending: FAMILY MEDICINE
Payer: MEDICARE

## 2019-04-19 DIAGNOSIS — K11.7 DROOLING: ICD-10-CM

## 2019-04-19 PROCEDURE — 74230 X-RAY XM SWLNG FUNCJ C+: CPT

## 2019-04-19 PROCEDURE — 92611 MOTION FLUOROSCOPY/SWALLOW: CPT

## 2019-04-19 NOTE — PROGRESS NOTES
[address]    Speech Pathology Modified barium swallow Study  Patient: Vivian Thompson (20 y.o. male)  Date: 4/19/2019  Referring Provider:  Catrachita Ernandez:   Patient's caregiver reports that the patient has been on purees, thins  for years due to a choking instance of solids. However, he steals other residents solids foods and eats them without issues. He takes pills in ap sauce. OBJECTIVE:   Past Medical History:   Past Medical History:   Diagnosis Date    Acute pharyngitis 9/29/2014    Agitation requiring sedation protocol 4/11/2018    Bronchitis, acute 12/12/2013    Cellulitis 7/26/2018    Cellulitis of groin 3/23/2016    Chronic rhinitis 4/11/2018    Contact dermatitis and other eczema, due to unspecified cause     Decrease in appetite 12/12/2013    Diabetes ([facility] [address]     Dysphagia 10/22/2014    Fall at nursing home 7/24/2017    Fever in adult 9/29/2014    Hypertension     Inguinal bulge 4/11/2018    Mental retardation     Prediabetes 10/2/2014    Screening for glaucoma 12/3/2014    Seizure ([address] 11/27/2017    Stool incontinence 4/11/2018     Past Surgical History:   Procedure Laterality Date    HX HEENT      dental surg. June 7, 2010     Current Dietary Status:  Purees, thins  Radiologist: Mary Jo  Film Views: Lateral  Patient Position: upright in Hausted chair    Trial 1: Trial 2:   Consistency Presented: Thin liquid; Solid;Puree;Pill/Tablet(pill in ap sauce)            ORAL PHASE:      Bolus Acceptance: (he refused most PO and was fed by caregiver, even though he feeds himself at home)     Bolus Formation/Control: Impaired(reduced chew. smashed material with ron on hard palate in addition to chew): Mastication was mostly frontal without rotary chew  :     Propulsion: Delayed (# of seconds)(at times vs refusal)     Oral Residue: Lingual   PHARYNGEAL PHASE:      Initiation of Swallow: No impairment     Timing: No impairment     Penetration: None     Aspiration/Timing: No evidence of aspiration, even when he gagged mid swallow b/c he did not like the taste. Pharyngeal Clearance: No residue           Effective Modifications: None                   Trial 3: Trial 4:                            :    :                                                                        Decreased Tongue Base Retraction?: No  Laryngeal Elevation: WFL (within functional limits)  Aspiration/Penetration Score: 1 (No penetration or aspiration-Contrast does not enter the airway)                     ASSESSMENT :  Based on the objective data described above, the patient presents with functional oral-pharyngeal swallow. PLAN/RECOMMENDATIONS :  Reluctant to upgrade diet based on this test, as sample size was limited due to reduced compliance. [facility] to determine how much and how well he takes PO at bedside. For now, continue dysphagia 1, thins. COMMUNICATION/EDUCATION:   The above findings and recommendations were discussed with: caregiver who verbalized understanding.     Thank you for this referral.  Joshua Sapp, SLP  Time Calculation: 15 mins

## 2019-05-02 ENCOUNTER — OFFICE VISIT (OUTPATIENT)
Dept: FAMILY MEDICINE CLINIC | Age: 44
End: 2019-05-02

## 2019-05-02 VITALS
TEMPERATURE: 97.2 F | RESPIRATION RATE: 20 BRPM | SYSTOLIC BLOOD PRESSURE: 86 MMHG | WEIGHT: 135 LBS | HEIGHT: 64 IN | BODY MASS INDEX: 23.05 KG/M2 | HEART RATE: 67 BPM | DIASTOLIC BLOOD PRESSURE: 64 MMHG

## 2019-05-02 DIAGNOSIS — L02.91 ABSCESS: Primary | ICD-10-CM

## 2019-05-02 DIAGNOSIS — K59.00 CONSTIPATION, UNSPECIFIED CONSTIPATION TYPE: ICD-10-CM

## 2019-05-02 DIAGNOSIS — I10 HTN, GOAL BELOW 140/90: ICD-10-CM

## 2019-05-02 RX ORDER — SULFAMETHOXAZOLE AND TRIMETHOPRIM 800; 160 MG/1; MG/1
1 TABLET ORAL 2 TIMES DAILY
Qty: 20 TAB | Refills: 0 | Status: SHIPPED | OUTPATIENT
Start: 2019-05-02 | End: 2019-05-12

## 2019-05-02 RX ORDER — ADHESIVE BANDAGE
30 BANDAGE TOPICAL
Qty: 354 ML | Refills: 1 | Status: SHIPPED | OUTPATIENT
Start: 2019-05-02 | End: 2019-05-20 | Stop reason: SDUPTHER

## 2019-05-02 RX ORDER — MAGNESIUM CITRATE
296 SOLUTION, ORAL ORAL
COMMUNITY
End: 2019-06-12 | Stop reason: SDUPTHER

## 2019-05-02 RX ORDER — ADHESIVE BANDAGE
30 BANDAGE TOPICAL DAILY PRN
COMMUNITY
End: 2019-05-02 | Stop reason: SDUPTHER

## 2019-05-02 NOTE — PROGRESS NOTES
HISTORY OF PRESENT ILLNESS  Edgardo Gillis is a 37 y.o. male. HPI patient with severe mental retardation on wheelchair presents with a , for blood pressure check and skin problem in addition to constipation unfortunately patient is very agitated and irritated when the medical staff try to obtain blood pressure on him for his blood pressure reading is not accurate patient has been compliant with the medication as per the staff is very hard to provide him his medication he is not compliant at all time, patient believes in the assisted living    As per the provider he has had a lump on the lt groin Started few days ago not better, the patient has tried alcohol washing and OTC antibiotic ointments,  no family member have the same problem, it does tingles and it is pain full, states that is expanding red, and is swelled up, like a lump    Constipation  Hard stool, every 3-4 days and has not noticed any rectal bleeding no fhx of colon cancer, no tarry stool, no abdominal pain, eats varieties of foods, no hc of UC, nor of Crohn's Dz, compliant with stool softner but is not helping patient also had normal colonoscopy in the past denies any nausea vomiting stating current medication is helping needs refill    Current Outpatient Medications   Medication Sig Dispense Refill    magnesium citrate solution Take 296 mL by mouth now. If no bowel in 24 hours after taking milk of magnesium giver one full bottle.  magnesium hydroxide (GARIBAY MILK OF MAGNESIA) 400 mg/5 mL suspension Take 30 mL by mouth nightly. Give 30 ml if no bowel movement in three days. 354 mL 1    trimethoprim-sulfamethoxazole (BACTRIM DS, SEPTRA DS) 160-800 mg per tablet Take 1 Tab by mouth two (2) times a day for 10 days.  20 Tab 0    lisinopril (PRINIVIL, ZESTRIL) 40 mg tablet Take 1 tab daily, Hold meds if systolic HR<51,RT diastolic KT<72,& if XW<50MBH,KARISSA CRANE if systolic ZD>131,US diastolic VZ>29 & if VM>693 bpm 90 Tab 1    cloNIDine HCl (CATAPRES) 0.2 mg tablet TAKE ONE TABLET BY MOUTH AT BEDTIME  OK TO CRUSH AND GIVE MEDS    Hold bp meds if systolic bp <71,  if diastolic bp <46, and if HR <60bpm, call MD if systolic BP > 319,  if diastolic bp >75 and if HR >100 bpm  Indications: Attention Deficit Disorder with Hyperactivity, high blood pressure 90 Tab 2    nebivolol (BYSTOLIC) 10 mg tablet TAKE 1 TABLET BY MOUTH TWICE A DAY  OK TO CRUSH AND GIVE MEDS,  Hold bp meds if systolic bp <92,  if diastolic bp <13, and if HR <60bpm, call MD if systolic BP > 435,  if diastolic bp >88 and if HR >100 bpm 60 Tab 6    potassium chloride SR (KLOR-CON 10) 10 mEq tablet TAKE 1 TABLET BY MOUTH DAILY DO NOT HU MUST SWALLOW WHOLE 28 Tab PRN    polyethylene glycol (MIRALAX) 17 gram packet Take 1 Packet by mouth daily. Hold for loose stool 30 Each 12    montelukast (SINGULAIR) 10 mg tablet Take 1 Tab by mouth daily. 30 Tab 6    omeprazole (PRILOSEC) 40 mg capsule TAKE 1 CAPSULE BY MOUTH EACH MORNING 30 MINUTES BEFORE BREAKFAST **OKTO OPEN CAPSULE AND GIVE CONTENTS** 30 Cap PRN    acetaminophen (TYLENOL) 325 mg tablet Take  by mouth every four (4) hours as needed for Pain.  DOC-Q-LACE 100 mg capsule TAKE (1) CAPSULE BY MOUTH TWICE DAILY (Patient taking differently: TAKE (1) CAPSULE BY MOUTH TWICE DAILY AS NEEDED) 60 Cap PRN    fluticasone (FLONASE) 50 mcg/actuation nasal spray BLOW NOSE: 2 SPRAYS IN EACH NOSTRIL DAILY FOR ALLERGY. 16 g 11    OTHER Pardeep's baby shampoo. use as lid scrub to each eye once daily      Miscellaneous Medical Supply (OCUSOFT EYELID CLEANSING PADS) pads by Does Not Apply route.  albuterol (PROVENTIL HFA, VENTOLIN HFA, PROAIR HFA) 90 mcg/actuation inhaler Take 1 puff by inhalation every four (4) hours as needed for Wheezing.  1 Inhaler 1    cetirizine (ZYRTEC) 10 mg tablet TAKE 1 TABLET BY MOUTH DAILY FOR ALLERGIES ** OK TO CRUSH AND GIVE MEDS** 30 Tab PRN    ciclopirox (PENLAC) 8 % solution APPLY TO AFFECTED AREA TWICE DAILY EXTERNALLY AS DIRECTED. 6.6 mL 11    alcaftadine (LASTACAFT) 0.25 % drop Apply  to eye. Allergies   Allergen Reactions    Zofran [Ondansetron Hcl] Itching     Arm turned red & itched    No Known Allergies Hives     Past Medical History:   Diagnosis Date    Acute pharyngitis 9/29/2014    Agitation requiring sedation protocol 4/11/2018    Bronchitis, acute 12/12/2013    Cellulitis 7/26/2018    Cellulitis of groin 3/23/2016    Chronic rhinitis 4/11/2018    Contact dermatitis and other eczema, due to unspecified cause     Decrease in appetite 12/12/2013    Diabetes ([facility] [address]     Dysphagia 10/22/2014    Fall at nursing home 7/24/2017    Fever in adult 9/29/2014    Hypertension     Inguinal bulge 4/11/2018    Mental retardation     Prediabetes 10/2/2014    Screening for glaucoma 12/3/2014    Seizure ([facility] [address] 11/27/2017    Stool incontinence 4/11/2018     Past Surgical History:   Procedure Laterality Date    HX HEENT      dental surg. June 7, 2010     Family History   Family history unknown: Yes     Social History     Tobacco Use    Smoking status: Never Smoker    Smokeless tobacco: Never Used   Substance Use Topics    Alcohol use: No      Lab Results   Component Value Date/Time    WBC 3.8 02/13/2019 01:45 PM    HGB 12.4 (L) 02/13/2019 01:45 PM    HCT 37.6 02/13/2019 01:45 PM    PLATELET 544 (L) 13/82/3968 01:45 PM    MCV 91 02/13/2019 01:45 PM     Lab Results   Component Value Date/Time    GFR est non-AA 90 02/13/2019 01:45 PM    GFR est  02/13/2019 01:45 PM    Creatinine 1.00 02/13/2019 01:45 PM    BUN 18 02/13/2019 01:45 PM    Sodium 142 02/13/2019 01:45 PM    Potassium 4.0 02/13/2019 01:45 PM    Chloride 102 02/13/2019 01:45 PM    CO2 24 02/13/2019 01:45 PM        Review of Systems   Constitutional: Negative for chills and fever. HENT: Negative for ear pain and nosebleeds. Eyes: Negative for blurred vision, pain and discharge. Respiratory: Negative for shortness of breath. Cardiovascular: Negative for chest pain and leg swelling. Gastrointestinal: Negative for constipation, diarrhea, nausea and vomiting. Genitourinary: Negative for frequency. Musculoskeletal: Negative for joint pain. Skin: Negative for itching and rash. Neurological: Negative for headaches. Psychiatric/Behavioral: Negative for depression. The patient is not nervous/anxious. Physical Exam   Constitutional: He is oriented to person, place, and time. He appears well-developed and well-nourished. HENT:   Head: Normocephalic and atraumatic. Mouth/Throat: No oropharyngeal exudate. Eyes: Pupils are equal, round, and reactive to light. Conjunctivae and EOM are normal.   Neck: Normal range of motion. Neck supple. No JVD present. No thyromegaly present. Cardiovascular: Normal rate, regular rhythm, normal heart sounds and intact distal pulses. Exam reveals no friction rub. No murmur heard. Pulmonary/Chest: Effort normal and breath sounds normal. No respiratory distress. He has no wheezes. He has no rales. Abdominal: Soft. Bowel sounds are normal. He exhibits no distension. There is no tenderness. Musculoskeletal: He exhibits no edema or tenderness. Lymphadenopathy:     He has no cervical adenopathy. Neurological: He is alert and oriented to person, place, and time. He has normal reflexes. Skin: Skin is warm. No rash noted. There is erythema. Psychiatric: He has a normal mood and affect. His behavior is normal.   Nursing note and vitals reviewed. ASSESSMENT and PLAN  Diagnoses and all orders for this visit:    1. Abscess  -     magnesium hydroxide (GARIBAY MILK OF MAGNESIA) 400 mg/5 mL suspension; Take 30 mL by mouth nightly. Give 30 ml if no bowel movement in three days. -     trimethoprim-sulfamethoxazole (BACTRIM DS, SEPTRA DS) 160-800 mg per tablet; Take 1 Tab by mouth two (2) times a day for 10 days. 2. HTN, goal below 140/90    3.  Constipation, unspecified constipation type  -     magnesium hydroxide (GARIBAY MILK OF MAGNESIA) 400 mg/5 mL suspension; Take 30 mL by mouth nightly. Give 30 ml if no bowel movement in three days.       Staff was told to obtain blood pressure reading before giving his blood pressure medication continue with a low-salt diet high-fiber intake in addition the  was told to continue washing with soap some water it has chance that the abscess opens up on him patient was told not to be afraid of an open wound continue washing with soap and water and continue to take the antibiotic return to clinic in 10 days for further follow-up if the abscess has not gone away  care agreed with today's recommendation

## 2019-05-02 NOTE — PATIENT INSTRUCTIONS
Skin Abscess: Care Instructions  Your Care Instructions    A skin abscess is a bacterial infection that forms a pocket of pus. A boil is a kind of skin abscess. The doctor may have cut an opening in the abscess so that the pus can drain out. You may have gauze in the cut so that the abscess will stay open and keep draining. You may need antibiotics. You will need to follow up with your doctor to make sure the infection has gone away. The doctor has checked you carefully, but problems can develop later. If you notice any problems or new symptoms, get medical treatment right away. Follow-up care is a key part of your treatment and safety. Be sure to make and go to all appointments, and call your doctor if you are having problems. It's also a good idea to know your test results and keep a list of the medicines you take. How can you care for yourself at home? · Apply warm and dry compresses, a heating pad set on low, or a hot water bottle 3 or 4 times a day for pain. Keep a cloth between the heat source and your skin. · If your doctor prescribed antibiotics, take them as directed. Do not stop taking them just because you feel better. You need to take the full course of antibiotics. · Take pain medicines exactly as directed. ? If the doctor gave you a prescription medicine for pain, take it as prescribed. ? If you are not taking a prescription pain medicine, ask your doctor if you can take an over-the-counter medicine. · Keep your bandage clean and dry. Change the bandage whenever it gets wet or dirty, or at least one time a day. · If the abscess was packed with gauze:  ? Keep follow-up appointments to have the gauze changed or removed. If the doctor instructed you to remove the gauze, follow the instructions you were given for how to remove it. ? After the gauze is removed, soak the area in warm water for 15 to 20 minutes 2 times a day, until the wound closes. When should you call for help?   Call your doctor now or seek immediate medical care if:    · You have signs of worsening infection, such as:  ? Increased pain, swelling, warmth, or redness. ? Red streaks leading from the infected skin. ? Pus draining from the wound. ? A fever.    Watch closely for changes in your health, and be sure to contact your doctor if:    · You do not get better as expected. Where can you learn more? Go to http://marie-angi.info/. Enter W015 in the search box to learn more about \"Skin Abscess: Care Instructions. \"  Current as of: April 17, 2018  Content Version: 11.9  © 8984-2920 [employer]. Care instructions adapted under license by [employer] (which disclaims liability or warranty for this information). If you have questions about a medical condition or this instruction, always ask your healthcare professional. [address] any warranty or liability for your use of this information.

## 2019-05-20 DIAGNOSIS — L02.91 ABSCESS: ICD-10-CM

## 2019-05-20 DIAGNOSIS — K59.00 CONSTIPATION, UNSPECIFIED CONSTIPATION TYPE: ICD-10-CM

## 2019-05-20 NOTE — TELEPHONE ENCOUNTER
----- Message from [facility] sent at 5/20/2019  4:37 PM EDT -----  Regarding: Dr. Rhonda Sheth, with [address], is requesting a call, to confirm Rx directions for [address] [place] [address]. Best contact number [phone].

## 2019-05-21 RX ORDER — ADHESIVE BANDAGE
30 BANDAGE TOPICAL
Qty: 354 ML | Refills: 1 | Status: SHIPPED | OUTPATIENT
Start: 2019-05-21 | End: 2019-06-12 | Stop reason: SDUPTHER

## 2019-06-12 ENCOUNTER — OFFICE VISIT (OUTPATIENT)
Dept: FAMILY MEDICINE CLINIC | Age: 44
End: 2019-06-12

## 2019-06-12 VITALS — RESPIRATION RATE: 16 BRPM | HEIGHT: 64 IN | BODY MASS INDEX: 23.17 KG/M2

## 2019-06-12 DIAGNOSIS — J31.0 CHRONIC RHINITIS: ICD-10-CM

## 2019-06-12 DIAGNOSIS — R19.09 INGUINAL BULGE: ICD-10-CM

## 2019-06-12 DIAGNOSIS — F73 PROFOUND INTELLECTUAL DISABILITY IN ADULT: ICD-10-CM

## 2019-06-12 DIAGNOSIS — L02.91 ABSCESS: ICD-10-CM

## 2019-06-12 DIAGNOSIS — L03.818 CELLULITIS OF OTHER SPECIFIED SITE: ICD-10-CM

## 2019-06-12 DIAGNOSIS — K59.00 CONSTIPATION, UNSPECIFIED CONSTIPATION TYPE: ICD-10-CM

## 2019-06-12 DIAGNOSIS — Z91.09 ENVIRONMENTAL ALLERGIES: Primary | ICD-10-CM

## 2019-06-12 DIAGNOSIS — R56.9 SEIZURE (HCC): ICD-10-CM

## 2019-06-12 DIAGNOSIS — D69.6 PLATELETS DECREASED (HCC): ICD-10-CM

## 2019-06-12 DIAGNOSIS — R45.1 AGITATION REQUIRING SEDATION PROTOCOL: ICD-10-CM

## 2019-06-12 RX ORDER — MAGNESIUM CITRATE
296 SOLUTION, ORAL ORAL
Qty: 296 ML | Refills: 0 | Status: SHIPPED | OUTPATIENT
Start: 2019-06-12 | End: 2019-06-12

## 2019-06-12 RX ORDER — DOCUSATE SODIUM 100 MG/1
CAPSULE, LIQUID FILLED ORAL
Qty: 60 CAP | Status: SHIPPED | OUTPATIENT
Start: 2019-06-12 | End: 2022-01-05 | Stop reason: ALTCHOICE

## 2019-06-12 RX ORDER — ADHESIVE BANDAGE
30 BANDAGE TOPICAL
Qty: 354 ML | Refills: 1 | Status: SHIPPED | OUTPATIENT
Start: 2019-06-12 | End: 2022-01-05 | Stop reason: ALTCHOICE

## 2019-06-12 RX ORDER — MONTELUKAST SODIUM 10 MG/1
TABLET ORAL
Qty: 30 TAB | Status: SHIPPED | OUTPATIENT
Start: 2019-06-12

## 2019-06-12 RX ORDER — CEPHALEXIN 500 MG/1
500 CAPSULE ORAL 3 TIMES DAILY
Qty: 21 CAP | Refills: 0 | Status: SHIPPED | OUTPATIENT
Start: 2019-06-12 | End: 2019-06-19

## 2019-06-12 RX ORDER — POLYETHYLENE GLYCOL 3350 17 G/17G
17 POWDER, FOR SOLUTION ORAL DAILY
Qty: 30 EACH | Refills: 12 | Status: SHIPPED | OUTPATIENT
Start: 2019-06-12 | End: 2022-01-05 | Stop reason: ALTCHOICE

## 2019-06-12 RX ORDER — FUROSEMIDE 20 MG/1
TABLET ORAL
Qty: 30 TAB | Refills: 0 | Status: SHIPPED | OUTPATIENT
Start: 2019-06-12 | End: 2021-07-16 | Stop reason: SDUPTHER

## 2019-06-12 NOTE — PATIENT INSTRUCTIONS
Seizure: Care Instruc Skin Abscess: Care Instructions Your Care Instructions A skin abscess is a bacterial infection that forms a pocket of pus. A boil is a kind of skin abscess. The doctor may have cut an opening in the abscess so that the pus can drain out. You may have gauze in the cut so that the abscess will stay open and keep draining. You may need antibiotics. You will need to follow up with your doctor to make sure the infection has gone away. The doctor has checked you carefully, but problems can develop later. If you notice any problems or new symptoms, get medical treatment right away. Follow-up care is a key part of your treatment and safety. Be sure to make and go to all appointments, and call your doctor if you are having problems. It's also a good idea to know your test results and keep a list of the medicines you take. How can you care for yourself at home? · Apply warm and dry compresses, a heating pad set on low, or a hot water bottle 3 or 4 times a day for pain. Keep a cloth between the heat source and your skin. · If your doctor prescribed antibiotics, take them as directed. Do not stop taking them just because you feel better. You need to take the full course of antibiotics. · Take pain medicines exactly as directed. ? If the doctor gave you a prescription medicine for pain, take it as prescribed. ? If you are not taking a prescription pain medicine, ask your doctor if you can take an over-the-counter medicine. · Keep your bandage clean and dry. Change the bandage whenever it gets wet or dirty, or at least one time a day. · If the abscess was packed with gauze: 
? Keep follow-up appointments to have the gauze changed or removed. If the doctor instructed you to remove the gauze, follow the instructions you were given for how to remove it. ? After the gauze is removed, soak the area in warm water for 15 to 20 minutes 2 times a day, until the wound closes. When should you call for help? Call your doctor now or seek immediate medical care if: 
  · You have signs of worsening infection, such as: 
? Increased pain, swelling, warmth, or redness. ? Red streaks leading from the infected skin. ? Pus draining from the wound. ? A fever.  
 Watch closely for changes in your health, and be sure to contact your doctor if: 
  · You do not get better as expected. Where can you learn more? Go to http://marie-angi.info/. Enter H006 in the search box to learn more about \"Skin Abscess: Care Instructions. \" Current as of: April 17, 2018 Content Version: 11.9 © 6196-6658 [employer]. Care instructions adapted under license by [employer] (which disclaims liability or warranty for this information). If you have questions about a medical condition or this instruction, always ask your healthcare professional. [place] [address] any warranty or liability for your use of this information. tions Your Care Instructions Seizures are caused by abnormal patterns of electrical signals in the brain. They are different for each person. Seizures can affect movement, speech, vision, or awareness. Some people have only slight shaking of a hand and do not pass out. Other people may pass out and have violent shaking of the whole body. Some people appear to stare into space. They are awake, but they can't respond normally. Later, they may not remember what happened. You may need tests to identify the type and cause of the seizures. A seizure may occur only once, or you may have them more than one time. Taking medicines as directed and following up with your doctor may help keep you from having more seizures. The doctor has checked you carefully, but problems can develop later. If you notice any problems or new symptoms, get medical treatment right away. Follow-up care is a key part of your treatment and safety. Be sure to make and go to all appointments, and call your doctor if you are having problems. It's also a good idea to know your test results and keep a list of the medicines you take. How can you care for yourself at home? · Be safe with medicines. Take your medicines exactly as prescribed. Call your doctor if you think you are having a problem with your medicine. · Do not do any activity that could be dangerous to you or others until your doctor says it is safe to do so. For example, do not drive a car, operate machinery, swim, or climb ladders. · Be sure that anyone treating you for any health problem knows that you have had a seizure and what medicines you are taking for it. · Identify and avoid things that may make you more likely to have a seizure. These may include lack of sleep, alcohol or drug use, stress, or not eating. · Make sure you go to your follow-up appointment. When should you call for help? Call 911 anytime you think you may need emergency care. For example, call if: 
  · You have another seizure.  
  · You have more than one seizure in 24 hours.  
  · You have new symptoms, such as trouble walking, speaking, or thinking clearly.  
 Call your doctor now or seek immediate medical care if: 
  · You are not acting normally.  
 Watch closely for changes in your health, and be sure to contact your doctor if you have any problems. Where can you learn more? Go to http://marie-angi.info/. Enter A181 in the search box to learn more about \"Seizure: Care Instructions. \" Current as of: Cintia 3, 2018 Content Version: 11.9 © 4679-1947 [employer]. Care instructions adapted under license by [employer] (which disclaims liability or warranty for this information).  If you have questions about a medical condition or this instruction, always ask your healthcare professional. [employer] disclaims any warranty or liability for your use of this information. Prediabetes: Care Instructions Your Care Instructions Prediabetes is a warning sign that you are at risk for getting type 2 diabetes. It means that your blood sugar is higher than it should be. The food you eat turns into sugar, which your body uses for energy. Normally, an organ called the pancreas makes insulin, which allows the sugar in your blood to get into your body's cells. But when your body can't use insulin the right way, the sugar doesn't move into cells. It stays in your blood instead. This is called insulin resistance. The buildup of sugar in the blood causes prediabetes. The good news is that lifestyle changes may help you get your blood sugar back to normal and help you avoid or delay diabetes. Follow-up care is a key part of your treatment and safety. Be sure to make and go to all appointments, and call your doctor if you are having problems. It's also a good idea to know your test results and keep a list of the medicines you take. How can you care for yourself at home? · Watch your weight. A healthy weight helps your body use insulin properly. · Limit the amount of calories, sweets, and unhealthy fat you eat. Ask your doctor if you should see a dietitian. A registered dietitian can help you create meal plans that fit your lifestyle. · Get at least 30 minutes of exercise on most days of the week. Exercise helps control your blood sugar. It also helps you maintain a healthy weight. Walking is a good choice. You also may want to do other activities, such as running, swimming, cycling, or playing tennis or team sports. · Do not smoke. Smoking can make prediabetes worse. If you need help quitting, talk to your doctor about stop-smoking programs and medicines. These can increase your chances of quitting for good. · If your doctor prescribed medicines, take them exactly as prescribed. Call your doctor if you think you are having a problem with your medicine. You will get more details on the specific medicines your doctor prescribes. When should you call for help? Watch closely for changes in your health, and be sure to contact your doctor if: 
  · You have any symptoms of diabetes. These may include: 
? Being thirsty more often. ? Urinating more. ? Being hungrier. ? Losing weight. ? Being very tired. ? Having blurry vision.  
  · You have a wound that will not heal.  
  · You have an infection that will not go away.  
  · You have problems with your blood pressure.  
  · You want more information about diabetes and how you can keep from getting it. Where can you learn more? Go to http://marie-angi.info/. Enter I222 in the search box to learn more about \"Prediabetes: Care Instructions. \" Current as of: July 25, 2018 Content Version: 11.9 © 4283-3342 [employer]. Care instructions adapted under license by [employer] (which disclaims liability or warranty for this information). If you have questions about a medical condition or this instruction, always ask your healthcare professional. [place] [address] any warranty or liability for your use of this information.

## 2019-06-12 NOTE — PROGRESS NOTES
Chief Complaint   Patient presents with    Skin Problem     1. Have you been to the ER, urgent care clinic since your last visit? Hospitalized since your last visit? No    2. Have you seen or consulted any other health care providers outside of the [address] [place] [address] since your last visit? Include any pap smears or colon screening. No      Came in today with caregiver,  C/o blotchy  redness to body,  More on legs,  Some swelling to knees.

## 2019-06-12 NOTE — PROGRESS NOTES
HISTORY OF PRESENT ILLNESS  Svetlana Burton is a 53 y.o. male. HPI   patient with severe mental retardation, seats tightly on wheelchair with heavy duty belt, unable to move lower ext, presents with a , for bilateral leg swelling left knee redness complaint of patient holding this breath so that the assisted living as the primary care physician to send the patient for EN, blood pressure re- check and skin problem  unfortunately patient is always very agitated  to obtain blood pressure on him, therefore his blood pressure reading is not accurate patient has been compliant with the medication as per the staff is very hard to provide him his medications he is not compliant at all time, patient lives in the assisted living, such patient can be easily overmedicated with blood pressure medication secondary to not being able to accurately obtain blood pressure levels on him, pt has had no seizural disorder since last visit,        Current Outpatient Medications   Medication Sig Dispense Refill    cephALEXin (KEFLEX) 500 mg capsule Take 1 Cap by mouth three (3) times daily for 7 days. 21 Cap 0    furosemide (LASIX) 20 mg tablet One tab daily 30 Tab 0    magnesium hydroxide (GARIBAY MILK OF MAGNESIA) 400 mg/5 mL suspension Take 30 mL by mouth nightly as needed for Constipation. Give ONLY if patient has  no bowel movement in three days. 354 mL 1    magnesium citrate solution Take 296 mL by mouth now for 1 dose. If no bowel in 24 hours after taking milk of magnesium giver one full bottle. 296 mL 0    polyethylene glycol (MIRALAX) 17 gram packet Take 1 Packet by mouth daily.  Hold for loose stool 30 Each 12    lisinopril (PRINIVIL, ZESTRIL) 40 mg tablet Take 1 tab daily, Hold meds if systolic CL<70,JY diastolic WY<86,& if HB<99POW,VBPN MD if systolic EU>442,DL diastolic BI>23 & if VM>251 bpm 90 Tab 1    cloNIDine HCl (CATAPRES) 0.2 mg tablet TAKE ONE TABLET BY MOUTH AT BEDTIME  OK TO CRUSH AND GIVE MEDS    Hold bp meds if systolic bp <30,  if diastolic bp <10, and if HR <60bpm, call MD if systolic BP > 505,  if diastolic bp >86 and if HR >100 bpm  Indications: Attention Deficit Disorder with Hyperactivity, high blood pressure 90 Tab 2    nebivolol (BYSTOLIC) 10 mg tablet TAKE 1 TABLET BY MOUTH TWICE A DAY  OK TO CRUSH AND GIVE MEDS,  Hold bp meds if systolic bp <90,  if diastolic bp <92, and if HR <60bpm, call MD if systolic BP > 705,  if diastolic bp >02 and if HR >100 bpm 60 Tab 6    potassium chloride SR (KLOR-CON 10) 10 mEq tablet TAKE 1 TABLET BY MOUTH DAILY DO NOT HU MUST SWALLOW WHOLE 28 Tab PRN    cetirizine (ZYRTEC) 10 mg tablet TAKE 1 TABLET BY MOUTH DAILY FOR ALLERGIES ** OK TO CRUSH AND GIVE MEDS** 30 Tab PRN    omeprazole (PRILOSEC) 40 mg capsule TAKE 1 CAPSULE BY MOUTH EACH MORNING 30 MINUTES BEFORE BREAKFAST **OKTO OPEN CAPSULE AND GIVE CONTENTS** 30 Cap PRN    acetaminophen (TYLENOL) 325 mg tablet Take  by mouth every four (4) hours as needed for Pain.  fluticasone (FLONASE) 50 mcg/actuation nasal spray BLOW NOSE: 2 SPRAYS IN EACH NOSTRIL DAILY FOR ALLERGY. 16 g 11    OTHER Pardeep's baby shampoo. use as lid scrub to each eye once daily      Miscellaneous Medical Supply (OCUSOFT EYELID CLEANSING PADS) pads by Does Not Apply route.  alcaftadine (LASTACAFT) 0.25 % drop Apply  to eye.  albuterol (PROVENTIL HFA, VENTOLIN HFA, PROAIR HFA) 90 mcg/actuation inhaler Take 1 puff by inhalation every four (4) hours as needed for Wheezing. 1 Inhaler 1    docusate sodium (COLACE) 100 mg capsule TAKE (1) CAPSULE BY MOUTH TWICE DAILY 60 Cap PRN    montelukast (SINGULAIR) 10 mg tablet TAKE 1 TABLET BY MOUTH DAILY ** OK TO CRUSH AND GIVE MEDS** 30 Tab PRN    ciclopirox (PENLAC) 8 % solution APPLY TO AFFECTED AREA TWICE DAILY EXTERNALLY AS DIRECTED.  6.6 mL 11     Allergies   Allergen Reactions    Zofran [Ondansetron Hcl] Itching     Arm turned red & itched    No Known Allergies Hives     Past Medical History:   Diagnosis Date    Acute pharyngitis 9/29/2014    Agitation requiring sedation protocol 4/11/2018    Bronchitis, acute 12/12/2013    Cellulitis 7/26/2018    Cellulitis of groin 3/23/2016    Chronic rhinitis 4/11/2018    Contact dermatitis and other eczema, due to unspecified cause     Decrease in appetite 12/12/2013    [facility] [address]     Dysphagia 10/22/2014    Fall at nursing home 7/24/2017    Fever in adult 9/29/2014    Hypertension     Inguinal bulge 4/11/2018    Mental retardation     Prediabetes 10/2/2014    Screening for glaucoma 12/3/2014    Seizure ([facility] [address] 11/27/2017    Stool incontinence 4/11/2018     Past Surgical History:   Procedure Laterality Date    HX HEENT      dental surg. June 7, 2010     Family History   Family history unknown: Yes     Social History     Tobacco Use    Smoking status: Never Smoker    Smokeless tobacco: Never Used   Substance Use Topics    Alcohol use: No      Lab Results   Component Value Date/Time    Hemoglobin A1c 6.0 (H) 07/24/2017 01:13 PM    Hemoglobin A1c 6.0 (H) 12/01/2016 10:27 AM    Hemoglobin A1c 6.1 (H) 02/24/2014 04:08 PM    Glucose 140 (H) 02/13/2019 01:45 PM    Microalb/Creat ratio (ug/mg creat.) 7.6 07/25/2017 04:30 PM    LDL, calculated 73 08/12/2013 10:55 AM    Creatinine 1.00 02/13/2019 01:45 PM      Lab Results   Component Value Date/Time    Cholesterol, total 160 03/28/2018 03:18 PM    HDL Cholesterol 42 11/08/2018 02:12 PM    LDL, calculated 73 08/12/2013 10:55 AM    Triglyceride 95 08/12/2013 10:55 AM    CHOL/HDL Ratio 3.6 08/25/2010 12:03 PM        Review of Systems   Constitutional: Negative for chills and fever. HENT: Negative for ear pain and nosebleeds. Eyes: Negative for blurred vision, pain and discharge. Respiratory: Negative for shortness of breath. Cardiovascular: Positive for leg swelling. Negative for chest pain. Gastrointestinal: Negative for constipation, diarrhea, nausea and vomiting. Genitourinary: Negative for frequency. Musculoskeletal: Negative for joint pain. Skin: Positive for rash. Negative for itching. Neurological: Negative for headaches. Psychiatric/Behavioral: Negative for depression. The patient is not nervous/anxious. Tried few times to obtain vs not succeded pt very irritated and fighting the exams  Physical Exam   Constitutional: He is oriented to person, place, and time. He appears well-developed and well-nourished. HENT:   Head: Normocephalic and atraumatic. Mouth/Throat: No oropharyngeal exudate. Eyes: Conjunctivae and EOM are normal.   Neck: Normal range of motion. Neck supple. Cardiovascular: Normal rate, regular rhythm and normal heart sounds. No murmur heard. Pulmonary/Chest: Effort normal and breath sounds normal. No respiratory distress. Abdominal: Soft. Bowel sounds are normal. He exhibits no distension and no mass. There is no rebound. Musculoskeletal: He exhibits edema. He exhibits no tenderness. Neurological: He is alert and oriented to person, place, and time. Skin: Skin is warm. Rash noted. There is erythema. Psychiatric: He has a normal mood and affect. His behavior is normal.   Nursing note and vitals reviewed. ASSESSMENT and PLAN  Diagnoses and all orders for this visit:    1. Environmental allergies  -     REFERRAL TO ENT-OTOLARYNGOLOGY    2. Cellulitis of other specified site  -     AMB SUPPLY ORDER    3. Abscess  -     magnesium hydroxide ([employer] MILK OF MAGNESIA) 400 mg/5 mL suspension; Take 30 mL by mouth nightly as needed for Constipation. Give ONLY if patient has  no bowel movement in three days.  -     magnesium citrate solution; Take 296 mL by mouth now for 1 dose. If no bowel in 24 hours after taking milk of magnesium giver one full bottle. 4. Constipation, unspecified constipation type  -     magnesium hydroxide ([employer] MILK OF MAGNESIA) 400 mg/5 mL suspension;  Take 30 mL by mouth nightly as needed for Constipation. Give ONLY if patient has  no bowel movement in three days.  -     magnesium citrate solution; Take 296 mL by mouth now for 1 dose. If no bowel in 24 hours after taking milk of magnesium giver one full bottle. Other orders  -     cephALEXin (KEFLEX) 500 mg capsule; Take 1 Cap by mouth three (3) times daily for 7 days. -     furosemide (LASIX) 20 mg tablet; One tab daily  -     polyethylene glycol (MIRALAX) 17 gram packet; Take 1 Packet by mouth daily.  Hold for loose stool

## 2019-07-02 ENCOUNTER — OFFICE VISIT (OUTPATIENT)
Dept: FAMILY MEDICINE CLINIC | Age: 44
End: 2019-07-02

## 2019-07-02 VITALS — HEIGHT: 64 IN | BODY MASS INDEX: 23.17 KG/M2 | RESPIRATION RATE: 14 BRPM

## 2019-07-02 DIAGNOSIS — L03.314 CELLULITIS OF GROIN: Primary | ICD-10-CM

## 2019-07-02 DIAGNOSIS — L02.214 CUTANEOUS ABSCESS OF GROIN: ICD-10-CM

## 2019-07-02 RX ORDER — SULFAMETHOXAZOLE AND TRIMETHOPRIM 800; 160 MG/1; MG/1
1 TABLET ORAL 2 TIMES DAILY
Qty: 28 TAB | Refills: 0 | Status: SHIPPED | OUTPATIENT
Start: 2019-07-02 | End: 2019-07-16

## 2019-07-02 NOTE — PATIENT INSTRUCTIONS
Skin Abscess: Care Instructions Your Care Instructions A skin abscess is a bacterial infection that forms a pocket of pus. A boil is a kind of skin abscess. The doctor may have cut an opening in the abscess so that the pus can drain out. You may have gauze in the cut so that the abscess will stay open and keep draining. You may need antibiotics. You will need to follow up with your doctor to make sure the infection has gone away. The doctor has checked you carefully, but problems can develop later. If you notice any problems or new symptoms, get medical treatment right away. Follow-up care is a key part of your treatment and safety. Be sure to make and go to all appointments, and call your doctor if you are having problems. It's also a good idea to know your test results and keep a list of the medicines you take. How can you care for yourself at home? · Apply warm and dry compresses, a heating pad set on low, or a hot water bottle 3 or 4 times a day for pain. Keep a cloth between the heat source and your skin. · If your doctor prescribed antibiotics, take them as directed. Do not stop taking them just because you feel better. You need to take the full course of antibiotics. · Take pain medicines exactly as directed. ? If the doctor gave you a prescription medicine for pain, take it as prescribed. ? If you are not taking a prescription pain medicine, ask your doctor if you can take an over-the-counter medicine. · Keep your bandage clean and dry. Change the bandage whenever it gets wet or dirty, or at least one time a day. · If the abscess was packed with gauze: 
? Keep follow-up appointments to have the gauze changed or removed. If the doctor instructed you to remove the gauze, follow the instructions you were given for how to remove it. ? After the gauze is removed, soak the area in warm water for 15 to 20 minutes 2 times a day, until the wound closes. When should you call for help? Call your doctor now or seek immediate medical care if: 
  · You have signs of worsening infection, such as: 
? Increased pain, swelling, warmth, or redness. ? Red streaks leading from the infected skin. ? Pus draining from the wound. ? A fever.  
 Watch closely for changes in your health, and be sure to contact your doctor if: 
  · You do not get better as expected. Where can you learn more? Go to http://marie-angi.info/. Enter P998 in the search box to learn more about \"Skin Abscess: Care Instructions. \" Current as of: April 17, 2018 Content Version: 11.9 © 2677-8354 [employer]. Care instructions adapted under license by [employer] (which disclaims liability or warranty for this information). If you have questions about a medical condition or this instruction, always ask your healthcare professional. [facility] [address] any warranty or liability for your use of this information.

## 2019-07-02 NOTE — PROGRESS NOTES
HISTORY OF PRESENT ILLNESS  Reymundo Camejo is a 53 y.o. male. HPI   Rash on the rt groin  Started few days ago not better, the patient has tried alcohol washing and OTC antibiotic ointments,  no family member have the same problem, it does tingles and it is pain full, states that is expanding red, and is swelled up, like a lump    Current Outpatient Medications   Medication Sig Dispense Refill    trimethoprim-sulfamethoxazole (BACTRIM DS) 160-800 mg per tablet Take 1 Tab by mouth two (2) times a day for 14 days. 28 Tab 0    docusate sodium (COLACE) 100 mg capsule TAKE (1) CAPSULE BY MOUTH TWICE DAILY 60 Cap PRN    montelukast (SINGULAIR) 10 mg tablet TAKE 1 TABLET BY MOUTH DAILY ** OK TO CRUSH AND GIVE MEDS** 30 Tab PRN    furosemide (LASIX) 20 mg tablet One tab daily 30 Tab 0    magnesium hydroxide (GARIBAY MILK OF MAGNESIA) 400 mg/5 mL suspension Take 30 mL by mouth nightly as needed for Constipation. Give ONLY if patient has  no bowel movement in three days. 354 mL 1    polyethylene glycol (MIRALAX) 17 gram packet Take 1 Packet by mouth daily.  Hold for loose stool 30 Each 12    lisinopril (PRINIVIL, ZESTRIL) 40 mg tablet Take 1 tab daily, Hold meds if systolic XW<00,WW diastolic NG<16,& if MD<29CAX,RGGB MD if systolic ER>255,XN diastolic JH>38 & if XA>115 bpm 90 Tab 1    cloNIDine HCl (CATAPRES) 0.2 mg tablet TAKE ONE TABLET BY MOUTH AT BEDTIME  OK TO CRUSH AND GIVE MEDS    Hold bp meds if systolic bp <87,  if diastolic bp <96, and if HR <60bpm, call MD if systolic BP > 660,  if diastolic bp >19 and if HR >100 bpm  Indications: Attention Deficit Disorder with Hyperactivity, high blood pressure 90 Tab 2    nebivolol (BYSTOLIC) 10 mg tablet TAKE 1 TABLET BY MOUTH TWICE A DAY  OK TO CRUSH AND GIVE MEDS,  Hold bp meds if systolic bp <01,  if diastolic bp <98, and if HR <60bpm, call MD if systolic BP > 157,  if diastolic bp >90 and if HR >100 bpm 60 Tab 6    potassium chloride SR (KLOR-CON 10) 10 mEq tablet TAKE 1 TABLET BY MOUTH DAILY DO NOT HU MUST SWALLOW WHOLE 28 Tab PRN    cetirizine (ZYRTEC) 10 mg tablet TAKE 1 TABLET BY MOUTH DAILY FOR ALLERGIES ** OK TO CRUSH AND GIVE MEDS** 30 Tab PRN    omeprazole (PRILOSEC) 40 mg capsule TAKE 1 CAPSULE BY MOUTH EACH MORNING 30 MINUTES BEFORE BREAKFAST **OKTO OPEN CAPSULE AND GIVE CONTENTS** 30 Cap PRN    acetaminophen (TYLENOL) 325 mg tablet Take  by mouth every four (4) hours as needed for Pain.  fluticasone (FLONASE) 50 mcg/actuation nasal spray BLOW NOSE: 2 SPRAYS IN EACH NOSTRIL DAILY FOR ALLERGY. 16 g 11    OTHER Pardeep's baby shampoo. use as lid scrub to each eye once daily      ciclopirox (PENLAC) 8 % solution APPLY TO AFFECTED AREA TWICE DAILY EXTERNALLY AS DIRECTED. 6.6 mL 11    M[employer] (OCUSOFT EYELID CLEANSING PADS) pads by Does Not Apply route.  alcaftadine (LASTACAFT) 0.25 % drop Apply  to eye.  albuterol (PROVENTIL HFA, VENTOLIN HFA, PROAIR HFA) 90 mcg/actuation inhaler Take 1 puff by inhalation every four (4) hours as needed for Wheezing. 1 Inhaler 1     Allergies   Allergen Reactions    Zofran [Ondansetron Hcl] Itching     Arm turned red & itched    No Known Allergies Hives     Past Medical History:   Diagnosis Date    Acute pharyngitis 9/29/2014    Agitation requiring sedation protocol 4/11/2018    Bronchitis, acute 12/12/2013    Cellulitis 7/26/2018    Cellulitis of groin 3/23/2016    Chronic rhinitis 4/11/2018    Contact dermatitis and other eczema, due to unspecified cause     Decrease in appetite 12/12/2013    Diabetes ([address]     Dysphagia 10/22/2014    Fall at nursing home 7/24/2017    Fever in adult 9/29/2014    Hypertension     Inguinal bulge 4/11/2018    Mental retardation     Prediabetes 10/2/2014    Screening for glaucoma 12/3/2014    Seizure ([address] 11/27/2017    Stool incontinence 4/11/2018     Past Surgical History:   Procedure Laterality Date    HX HEENT      dental surg.  June 7, 2010     Family History   Family history unknown: Yes     Social History     Tobacco Use    Smoking status: Never Smoker    Smokeless tobacco: Never Used   Substance Use Topics    Alcohol use: No    \  Lab Results   Component Value Date/Time    WBC 3.8 02/13/2019 01:45 PM    HGB 12.4 (L) 02/13/2019 01:45 PM    HCT 37.6 02/13/2019 01:45 PM    PLATELET 448 (L) 01/06/3213 01:45 PM    MCV 91 02/13/2019 01:45 PM     Lab Results   Component Value Date/Time    ALT (SGPT) 31 02/13/2019 01:45 PM    AST (SGOT) 13 02/13/2019 01:45 PM    Alk. phosphatase 68 02/13/2019 01:45 PM    Bilirubin, total <0.2 02/13/2019 01:45 PM    Albumin 3.9 02/13/2019 01:45 PM    Protein, total 7.0 02/13/2019 01:45 PM    INR 1.0 04/17/2018 08:34 PM    Prothrombin time 10.5 04/17/2018 08:34 PM    PLATELET 958 (L) 69/81/5072 01:45 PM        Review of Systems   Constitutional: Negative for chills and fever. HENT: Negative for ear pain and nosebleeds. Eyes: Negative for blurred vision, pain and discharge. Respiratory: Negative for shortness of breath. Cardiovascular: Negative for chest pain and leg swelling. Gastrointestinal: Negative for constipation, diarrhea, nausea and vomiting. Genitourinary: Negative for frequency. Musculoskeletal: Positive for myalgias. Negative for joint pain. Skin: Positive for rash. Negative for itching. Neurological: Negative for headaches. Psychiatric/Behavioral: Negative for depression. The patient is not nervous/anxious. Physical Exam   Constitutional: He is oriented to person, place, and time. He appears well-developed and well-nourished. HENT:   Head: Normocephalic and atraumatic. Mouth/Throat: No oropharyngeal exudate. Eyes: Conjunctivae and EOM are normal.   Neck: Normal range of motion. Neck supple. Cardiovascular: Normal rate, regular rhythm and normal heart sounds. No murmur heard. Pulmonary/Chest: Effort normal and breath sounds normal. No respiratory distress. Abdominal: Soft. Bowel sounds are normal. He exhibits mass. He exhibits no distension. There is no rebound. Musculoskeletal: He exhibits no edema or tenderness. Neurological: He is alert and oriented to person, place, and time. Skin: Skin is warm. There is erythema. Psychiatric: He has a normal mood and affect. His behavior is normal.   Nursing note and vitals reviewed. ASSESSMENT and PLAN  Diagnoses and all orders for this visit:    1. Cellulitis of groin  -     trimethoprim-sulfamethoxazole (BACTRIM DS) 160-800 mg per tablet; Take 1 Tab by mouth two (2) times a day for 14 days.  -     DRAIN SKIN ABSCESS SIMPLE  -     AMB SUPPLY ORDER  -     AEROBIC BACTERIAL CULTURE  -     [place] [facility]&/OR CONVEY OF SPEC FOR TR OFFICE TO LAB    2. Cutaneous abscess of groin      Subjective:    Jazmine Carroll is a 53 y.o. male who presents for lesion removal. We have discussed this procedure, including option of not performing surgery, technique of surgery and potential for scarring at an earlier visit. Oubjective:   Patient appears well. Visit Vitals  BP (P) 122/74 (BP 1 Location: Left arm, BP Patient Position: Sitting)   Pulse (P) 68   Temp (P) 98.3 °F (36.8 °C) (Axillary)   Resp 14   Ht 5' 4\" (1.626 m)   SpO2 (P) 94%   BMI 23.17 kg/m²     Skin: walnut size abscess of the left groin area drained no packing pt is very challenging dsng applied to be changed daily    Assessment:   skin abscess    Procedure Note:   After informed consent was obtained, using alcohol for cleansing and none for anesthetic, with sterile technique,   drainage of abscess was performed. Antibiotic dressing is applied, and wound care instructions provided. Be alert for any signs of cutaneous infection. The procedure was well tolerated without complications.  Follow up: the patient may return in 10 days

## 2019-07-02 NOTE — PROGRESS NOTES
Chief Complaint   Patient presents with    Skin Problem     left groin boil     1. Have you been to the ER, urgent care clinic since your last visit? Hospitalized since your last visit? No    2. Have you seen or consulted any other health care providers outside of the [facility] since your last visit? Include any pap smears or colon screening.  No      Came in today with group [facility] staff,  She stated pt has a boil to leftt groin,  Has been draining small amount of blood

## 2019-07-05 LAB
BACTERIA SPEC AEROBE CULT: ABNORMAL
BACTERIA SPEC AEROBE CULT: ABNORMAL

## 2019-07-11 ENCOUNTER — OFFICE VISIT (OUTPATIENT)
Dept: FAMILY MEDICINE CLINIC | Age: 44
End: 2019-07-11

## 2019-07-11 VITALS
RESPIRATION RATE: 20 BRPM | SYSTOLIC BLOOD PRESSURE: 141 MMHG | HEART RATE: 84 BPM | BODY MASS INDEX: 23.05 KG/M2 | HEIGHT: 64 IN | DIASTOLIC BLOOD PRESSURE: 84 MMHG | WEIGHT: 135 LBS

## 2019-07-11 DIAGNOSIS — L03.314 CELLULITIS OF GROIN: Primary | ICD-10-CM

## 2019-07-11 DIAGNOSIS — L02.91 ABSCESS: ICD-10-CM

## 2019-07-11 RX ORDER — DOXYCYCLINE 100 MG/1
100 CAPSULE ORAL 2 TIMES DAILY
Qty: 20 CAP | Refills: 0 | Status: SHIPPED | OUTPATIENT
Start: 2019-07-11 | End: 2019-07-21

## 2019-07-11 NOTE — PATIENT INSTRUCTIONS
Skin Abscess: Care Instructions Your Care Instructions A skin abscess is a bacterial infection that forms a pocket of pus. A boil is a kind of skin abscess. The doctor may have cut an opening in the abscess so that the pus can drain out. You may have gauze in the cut so that the abscess will stay open and keep draining. You may need antibiotics. You will need to follow up with your doctor to make sure the infection has gone away. The doctor has checked you carefully, but problems can develop later. If you notice any problems or new symptoms, get medical treatment right away. Follow-up care is a key part of your treatment and safety. Be sure to make and go to all appointments, and call your doctor if you are having problems. It's also a good idea to know your test results and keep a list of the medicines you take. How can you care for yourself at home? · Apply warm and dry compresses, a heating pad set on low, or a hot water bottle 3 or 4 times a day for pain. Keep a cloth between the heat source and your skin. · If your doctor prescribed antibiotics, take them as directed. Do not stop taking them just because you feel better. You need to take the full course of antibiotics. · Take pain medicines exactly as directed. ? If the doctor gave you a prescription medicine for pain, take it as prescribed. ? If you are not taking a prescription pain medicine, ask your doctor if you can take an over-the-counter medicine. · Keep your bandage clean and dry. Change the bandage whenever it gets wet or dirty, or at least one time a day. · If the abscess was packed with gauze: 
? Keep follow-up appointments to have the gauze changed or removed. If the doctor instructed you to remove the gauze, follow the instructions you were given for how to remove it. ? After the gauze is removed, soak the area in warm water for 15 to 20 minutes 2 times a day, until the wound closes. When should you call for help? Call your doctor now or seek immediate medical care if: 
  · You have signs of worsening infection, such as: 
? Increased pain, swelling, warmth, or redness. ? Red streaks leading from the infected skin. ? Pus draining from the wound. ? A fever.  
 Watch closely for changes in your health, and be sure to contact your doctor if: 
  · You do not get better as expected. Where can you learn more? Go to http://marie-angi.info/. Enter I324 in the search box to learn more about \"Skin Abscess: Care Instructions. \" Current as of: April 17, 2018 Content Version: 11.9 © 2074-1885 [employer]. Care instructions adapted under license by [employer] (which disclaims liability or warranty for this information). If you have questions about a medical condition or this instruction, always ask your healthcare professional. [address] any warranty or liability for your use of this information.

## 2019-07-11 NOTE — PROGRESS NOTES
Name and  verified        Chief Complaint   Patient presents with    Skin Problem     Left Groin follow up         Health Maintenance reviewed-discussed with patient. 1. Have you been to the ER, urgent care clinic since your last visit? Hospitalized since your last visit? No    2. Have you seen or consulted any other health care providers outside of the [address] [place] [address] since your last visit? Include any pap smears or colon screening.  No

## 2019-07-12 NOTE — PROGRESS NOTES
Department of Interventional Radiology  [phone]    History and Physical    Patient:  Yakov Prescott MRN:  [id number]  SSN:  [id number]    YOB: 1962  Age:  62 y.o. Sex:  female      Primary Care Provider:  Denita Cortez MD  Referring Physician:  Dagoberto Davies MD    Subjective:     Chief Complaint: port    History of the Present Illness: The patient is a 62 y.o. female who presents for venous chest port placement. Lymphoma. NPO x meds. Past Medical History:   Diagnosis Date    Anemia     in high school, \"received gamma globulin twice a week for awhile\"    Arthritis     osteo-r knee    Basal cell carcinoma     Followed by Dermatology    Chronic pain     KNEEs    Hypertension 10/4/2011    medication    Morbid obesity ([address]     Murmur     \"had it my whole life\", did not appreciate murmur 9/12/2011 during assessment    Non Hodgkin's lymphoma ([address] 3/30/2017    Other ill-defined conditions     skin bumps-med as needed    Overactive bladder     Rheumatic fever     Possibly as a child    Shingles     Thromboembolus ([address] x2    LLE-calf-1990?-only took ASA-resolved in less than a wk-\"a little burned area-not examined\"    Thyroid disease     hypo-medication    Unspecified adverse effect of anesthesia     pt reports waking several times with knee surgery-spinal     Past Surgical History:   Procedure Laterality Date    [place] CHOLECYSTECTOMY [id number]      HX CHOLECYSTECTOMY  1983    HX ORTHOPAEDIC  2012    right TKA    HX ORTHOPAEDIC  2013    left TKA. Dr. Colletta Callander.  NE ANESTH,ACHILLES TENDON SURG  2/10/2011    LEFT. Dr. Levi Bah. Review of Systems:    Pertinent items are noted in the History of Present Illness. Current Outpatient Prescriptions   Medication Sig    allopurinol (ZYLOPRIM) 300 mg tablet Take 1 Tab by mouth two (2) times a day.  pregabalin (LYRICA) 25 mg capsule Take 1 Cap by mouth three (3) times daily.  Max Daily Amount: 75 mg.   [facility] HISTORY OF PRESENT ILLNESS  Ronda Fraser is a 53 y.o. male. HPI   Pt with severe mental retardation wheelchair dependent provided by the nurse  patient lives in assisted living present for f/u the patient was started on Abx high dose for the next 10 days, patient states that he has 1 more day left in addition patient was advised to wash bid with soaps and water, multiple hand washing, medications side effects was addressed, also  was told to RTC in 10 days for the progress, or call if not better, today pt states that it did opened up for and currently draining  and has been doing DSNG changes daily including today and so far happy with progress and has been compliant with meds and medical advised, unfortunately the infection site not all gone away at this time no drainage but skin is red erythematous and tender to touch    Current Outpatient Medications   Medication Sig Dispense Refill    doxycycline (VIBRAMYCIN) 100 mg capsule Take 1 Cap by mouth two (2) times a day for 10 days. 20 Cap 0    trimethoprim-sulfamethoxazole (BACTRIM DS) 160-800 mg per tablet Take 1 Tab by mouth two (2) times a day for 14 days. 28 Tab 0    docusate sodium (COLACE) 100 mg capsule TAKE (1) CAPSULE BY MOUTH TWICE DAILY 60 Cap PRN    montelukast (SINGULAIR) 10 mg tablet TAKE 1 TABLET BY MOUTH DAILY ** OK TO CRUSH AND GIVE MEDS** 30 Tab PRN    furosemide (LASIX) 20 mg tablet One tab daily 30 Tab 0    magnesium hydroxide (GARIBAY MILK OF MAGNESIA) 400 mg/5 mL suspension Take 30 mL by mouth nightly as needed for Constipation. Give ONLY if patient has  no bowel movement in three days. 354 mL 1    polyethylene glycol (MIRALAX) 17 gram packet Take 1 Packet by mouth daily.  Hold for loose stool 30 Each 12    lisinopril (PRINIVIL, ZESTRIL) 40 mg tablet Take 1 tab daily, Hold meds if systolic MR<78,VN diastolic QM<22,& if DZ<51FWU,WPOQ MD if systolic SP>336,QY diastolic CM>74 & if EJ>083 bpm 90 Tab 1    cloNIDine HCl (CATAPRES) 0.2 mg tablet TAKE ONE TABLET BY MOUTH AT BEDTIME  OK TO CRUSH AND GIVE MEDS    Hold bp meds if systolic bp <14,  if diastolic bp <77, and if HR <60bpm, call MD if systolic BP > 029,  if diastolic bp >08 and if HR >100 bpm  Indications: Attention Deficit Disorder with Hyperactivity, high blood pressure 90 Tab 2    nebivolol (BYSTOLIC) 10 mg tablet TAKE 1 TABLET BY MOUTH TWICE A DAY  OK TO CRUSH AND GIVE MEDS,  Hold bp meds if systolic bp <10,  if diastolic bp <41, and if HR <60bpm, call MD if systolic BP > 937,  if diastolic bp >49 and if HR >100 bpm 60 Tab 6    potassium chloride SR (KLOR-CON 10) 10 mEq tablet TAKE 1 TABLET BY MOUTH DAILY DO NOT HU MUST SWALLOW WHOLE 28 Tab PRN    cetirizine (ZYRTEC) 10 mg tablet TAKE 1 TABLET BY MOUTH DAILY FOR ALLERGIES ** OK TO CRUSH AND GIVE MEDS** 30 Tab PRN    omeprazole (PRILOSEC) 40 mg capsule TAKE 1 CAPSULE BY MOUTH EACH MORNING 30 MINUTES BEFORE BREAKFAST **OKTO OPEN CAPSULE AND GIVE CONTENTS** 30 Cap PRN    acetaminophen (TYLENOL) 325 mg tablet Take  by mouth every four (4) hours as needed for Pain.  fluticasone (FLONASE) 50 mcg/actuation nasal spray BLOW NOSE: 2 SPRAYS IN EACH NOSTRIL DAILY FOR ALLERGY. 16 g 11    OTHER Pardeep's baby shampoo. use as lid scrub to each eye once daily      ciclopirox (PENLAC) 8 % solution APPLY TO AFFECTED AREA TWICE DAILY EXTERNALLY AS DIRECTED. 6.6 mL 11    albuterol (PROVENTIL HFA, VENTOLIN HFA, PROAIR HFA) 90 mcg/actuation inhaler Take 1 puff by inhalation every four (4) hours as needed for Wheezing. 1 Inhaler 1    Miscellaneous Medical Supply (OCUSOFT EYELID CLEANSING PADS) pads by Does Not Apply route.  alcaftadine (LASTACAFT) 0.25 % drop Apply  to eye.        Allergies   Allergen Reactions    Zofran [Ondansetron Hcl] Itching     Arm turned red & itched    No Known Allergies Hives     Past Medical History:   Diagnosis Date    Acute pharyngitis 9/29/2014    Agitation requiring sedation protocol 4/11/2018    nystatin (MYCOSTATIN) powder Apply  to affected area three (3) times daily.  lidocaine-prilocaine (EMLA) topical cream Apply  to affected area as needed for Pain. Apply to port site 45-60 minutes prior to lab appt or infusion.  hydrOXYzine pamoate (VISTARIL) 25 mg capsule Take 1 Cap by mouth four (4) times daily as needed for Itching for up to 14 days.  cpm-phenyleph-acetaminophen (NOREL AD) 4- mg tab Take 1 Tab by mouth every eight (8) hours as needed for Other (cough/congestion).  magic mouthwash (ALEXSANDER) susp Take 10 mL by mouth every four (4) hours as needed.  HYDROcodone-acetaminophen (NORCO) 5-325 mg per tablet Take 1-2 Tabs by mouth every four (4) hours as needed for Pain. Max Daily Amount: 12 Tabs.  promethazine (PHENERGAN) 25 mg tablet Take 25 mg by mouth every six (6) hours as needed for Nausea. Unsure of dose    ondansetron hcl (ZOFRAN, AS HYDROCHLORIDE,) 4 mg tablet Take 1 Tab by mouth every eight (8) hours as needed for Nausea.  aspirin 81 mg chewable tablet Take 81 mg by mouth daily.  levothyroxine (SYNTHROID) 125 mcg tablet Take 1 Tab by mouth Daily (before breakfast).  lisinopril (PRINIVIL, ZESTRIL) 20 mg tablet Take 1 Tab by mouth daily.  omeprazole (PRILOSEC) 20 mg capsule Take 1 Cap by mouth daily.      Current Facility-Administered Medications   Medication Dose Route Frequency    lidocaine (XYLOCAINE) 20 mg/mL (2 %) injection 100-200 mg  100-200 mg IntraDERMal ONCE    lidocaine-EPINEPHrine (XYLOCAINE) 2 %-1:100,000 injection 200-400 mg  200-400 mg IntraDERMal ONCE    0.9% sodium chloride infusion  25 mL/hr IntraVENous ONCE    diphenhydrAMINE (BENADRYL) injection 12.5-50 mg  12.5-50 mg IntraVENous ONCE    fentaNYL citrate (PF) injection  mcg   mcg IntraVENous Multiple    midazolam (VERSED) injection 0.25-2 mg  0.25-2 mg IntraVENous Multiple    heparin (PF) 2 units/ml in NS infusion 2,000 Units  1,000 mL Irrigation Multiple    heparin (porcine) 100 unit/mL injection 500 Units  500 Units InterCATHeter ONCE        No Known Allergies    Family History   Problem Relation Age of Onset    Post-op Nausea/Vomiting Other      X3    Breast Cancer Other 28     cousin    Kidney Disease Father      RENAL FAILURE    Other Son     Other Daughter     Other Maternal Grandmother      Vascular Disorder with leg amputation    Liver Disease Sister      non-alcoholic    Celiac Disease Sister     Malignant Hyperthermia Neg Hx     Pseudocholinesterase Deficiency Neg Hx     Delayed Awakening Neg Hx     Emergence Delirium Neg Hx     Post-op Cognitive Dysfunction Neg Hx      Social History   Substance Use Topics    Smoking status: Never Smoker    Smokeless tobacco: Never Used    Alcohol use Yes      Comment: RARE, ONCE/TWICE A YEAR        Objective:       Physical Examination:    There were no vitals filed for this visit. Last menstrual period 04/21/2015, not currently breastfeeding.   HEART: regular rate and rhythm  LUNG: clear to auscultation bilaterally  ABDOMEN: normal findings: soft, non-tender  EXTREMITIES: warm, no edema    Laboratory:     Lab Results   Component Value Date/Time    Sodium 139 04/13/2017 02:50 PM    Sodium 140 04/13/2017 07:11 AM    Potassium 5.0 04/13/2017 02:50 PM    Potassium 5.0 04/13/2017 07:11 AM    Chloride 106 04/13/2017 02:50 PM    Chloride 105 04/13/2017 07:11 AM    CO2 21 04/13/2017 02:50 PM    CO2 24 04/13/2017 07:11 AM    Anion gap 12 04/13/2017 02:50 PM    Anion gap 11 04/13/2017 07:11 AM    Glucose 118 04/13/2017 02:50 PM    Glucose 93 04/13/2017 07:11 AM    BUN 41 04/13/2017 02:50 PM    BUN 40 04/13/2017 07:11 AM    Creatinine 1.54 04/13/2017 02:50 PM    Creatinine 1.39 04/13/2017 07:11 AM    GFR est AA 45 04/13/2017 02:50 PM    GFR est AA 50 04/13/2017 07:11 AM    GFR est non-AA 37 04/13/2017 02:50 PM    GFR est non-AA 42 04/13/2017 07:11 AM    Calcium 8.7 04/13/2017 02:50 PM    Calcium 9.3 04/13/2017 07:11 AM    Magnesium 2.0 Bronchitis, acute 12/12/2013    Cellulitis 7/26/2018    Cellulitis of groin 3/23/2016    Chronic rhinitis 4/11/2018    Contact dermatitis and other eczema, due to unspecified cause     Decrease in appetite 12/12/2013    Diabetes ([facility]     Dysphagia 10/22/2014    Fall at nursing home 7/24/2017    Fever in adult 9/29/2014    Hypertension     Inguinal bulge 4/11/2018    Mental retardation     Prediabetes 10/2/2014    Screening for glaucoma 12/3/2014    Seizure ([facility] [facility] 11/27/2017    Stool incontinence 4/11/2018     Past Surgical History:   Procedure Laterality Date    HX HEENT      dental surg. June 7, 2010     Family History   Family history unknown: Yes     Social History     Tobacco Use    Smoking status: Never Smoker    Smokeless tobacco: Never Used   Substance Use Topics    Alcohol use: No      Lab Results   Component Value Date/Time    WBC 3.8 02/13/2019 01:45 PM    HGB 12.4 (L) 02/13/2019 01:45 PM    HCT 37.6 02/13/2019 01:45 PM    PLATELET 275 (L) 49/91/7971 01:45 PM    MCV 91 02/13/2019 01:45 PM     Lab Results   Component Value Date/Time    GFR est non-AA 90 02/13/2019 01:45 PM    GFR est  02/13/2019 01:45 PM    Creatinine 1.00 02/13/2019 01:45 PM    BUN 18 02/13/2019 01:45 PM    Sodium 142 02/13/2019 01:45 PM    Potassium 4.0 02/13/2019 01:45 PM    Chloride 102 02/13/2019 01:45 PM    CO2 24 02/13/2019 01:45 PM        Review of Systems   Constitutional: Negative for chills and fever. HENT: Negative for ear pain and nosebleeds. Eyes: Negative for blurred vision, pain and discharge. Respiratory: Negative for shortness of breath. Cardiovascular: Negative for chest pain and leg swelling. Gastrointestinal: Negative for constipation, diarrhea, nausea and vomiting. Genitourinary: Negative for frequency. Musculoskeletal: Negative for joint pain. Skin: Negative for itching and rash. Neurological: Negative for headaches. Psychiatric/Behavioral: Negative for depression.  The 04/13/2017 02:50 PM    Magnesium 1.9 04/13/2017 07:11 AM    Phosphorus 4.3 04/13/2017 02:50 PM    Phosphorus 4.0 04/13/2017 07:11 AM    Albumin 2.6 04/13/2017 07:11 AM    Albumin 2.7 04/10/2017 07:41 AM    Protein, total 5.7 04/13/2017 07:11 AM    Protein, total 5.7 04/10/2017 07:41 AM    Globulin 3.1 04/13/2017 07:11 AM    Globulin 3.0 04/10/2017 07:41 AM    A-G Ratio 0.8 04/13/2017 07:11 AM    A-G Ratio 0.9 04/10/2017 07:41 AM    AST (SGOT) 30 04/13/2017 07:11 AM    AST (SGOT) 28 04/10/2017 07:41 AM    ALT (SGPT) 17 04/13/2017 07:11 AM    ALT (SGPT) 17 04/10/2017 07:41 AM     Lab Results   Component Value Date/Time    WBC 4.3 04/13/2017 07:11 AM    WBC 4.7 04/07/2017 11:18 AM    HGB 8.0 04/13/2017 07:11 AM    HGB 8.2 04/07/2017 11:18 AM    HCT 25.9 04/13/2017 07:11 AM    HCT 26.7 04/07/2017 11:18 AM    PLATELET 824 45/22/2039 07:11 AM    PLATELET 457 52/29/3646 11:18 AM     Lab Results   Component Value Date/Time    aPTT 23.4 04/13/2017 07:40 AM    aPTT 23.9 03/30/2017 03:25 PM    Prothrombin time 10.2 04/13/2017 07:40 AM    Prothrombin time 10.9 03/30/2017 03:25 PM    INR 1.0 04/13/2017 07:40 AM    INR 1.0 03/30/2017 03:25 PM       Assessment:     lymphoma    Plan:     Planned Procedure:  Port placement    Risks, benefits, and alternatives reviewed with patient and she agrees to proceed with the procedure.       Signed By: Corrine Ayala PA-C     April 18, 2017 patient is not nervous/anxious. Physical Exam   Constitutional: He appears well-developed and well-nourished. HENT:   Head: Normocephalic and atraumatic. Mouth/Throat: No oropharyngeal exudate. Eyes: Conjunctivae and EOM are normal.   Neck: Normal range of motion. Neck supple. Cardiovascular: Normal rate, regular rhythm and normal heart sounds. No murmur heard. Pulmonary/Chest: Effort normal and breath sounds normal. No respiratory distress. Abdominal: Soft. Bowel sounds are normal. He exhibits no distension and no mass. There is no rebound. Musculoskeletal: He exhibits no edema or tenderness. Neurological: He is alert. Skin: Skin is warm. Rash noted. There is erythema. Psychiatric: He has a normal mood and affect. His behavior is normal.   Nursing note and vitals reviewed. ASSESSMENT and PLAN  Diagnoses and all orders for this visit:    1. Cellulitis of groin  -     doxycycline (VIBRAMYCIN) 100 mg capsule; Take 1 Cap by mouth two (2) times a day for 10 days. 2. Abscess  -     doxycycline (VIBRAMYCIN) 100 mg capsule; Take 1 Cap by mouth two (2) times a day for 10 days.       Will start on different Abx high dose for the next 10 days, advise to wash bid with soaps and water, multiple hand washing, medications side effects was addressed, was told to RTC or call if not better

## 2019-07-18 ENCOUNTER — DOCUMENTATION ONLY (OUTPATIENT)
Dept: INTERNAL MEDICINE CLINIC | Age: 44
End: 2019-07-18

## 2019-07-25 ENCOUNTER — OFFICE VISIT (OUTPATIENT)
Dept: FAMILY MEDICINE CLINIC | Age: 44
End: 2019-07-25

## 2019-07-25 VITALS
HEIGHT: 64 IN | HEART RATE: 82 BPM | TEMPERATURE: 95.2 F | RESPIRATION RATE: 16 BRPM | DIASTOLIC BLOOD PRESSURE: 98 MMHG | OXYGEN SATURATION: 96 % | BODY MASS INDEX: 23.17 KG/M2 | SYSTOLIC BLOOD PRESSURE: 179 MMHG

## 2019-07-25 DIAGNOSIS — L03.314 CELLULITIS OF GROIN: Primary | ICD-10-CM

## 2019-07-25 NOTE — PATIENT INSTRUCTIONS
Skin Abscess: Care Instructions  Your Care Instructions    A skin abscess is a bacterial infection that forms a pocket of pus. A boil is a kind of skin abscess. The doctor may have cut an opening in the abscess so that the pus can drain out. You may have gauze in the cut so that the abscess will stay open and keep draining. You may need antibiotics. You will need to follow up with your doctor to make sure the infection has gone away. The doctor has checked you carefully, but problems can develop later. If you notice any problems or new symptoms, get medical treatment right away. Follow-up care is a key part of your treatment and safety. Be sure to make and go to all appointments, and call your doctor if you are having problems. It's also a good idea to know your test results and keep a list of the medicines you take. How can you care for yourself at home? · Apply warm and dry compresses, a heating pad set on low, or a hot water bottle 3 or 4 times a day for pain. Keep a cloth between the heat source and your skin. · If your doctor prescribed antibiotics, take them as directed. Do not stop taking them just because you feel better. You need to take the full course of antibiotics. · Take pain medicines exactly as directed. ? If the doctor gave you a prescription medicine for pain, take it as prescribed. ? If you are not taking a prescription pain medicine, ask your doctor if you can take an over-the-counter medicine. · Keep your bandage clean and dry. Change the bandage whenever it gets wet or dirty, or at least one time a day. · If the abscess was packed with gauze:  ? Keep follow-up appointments to have the gauze changed or removed. If the doctor instructed you to remove the gauze, follow the instructions you were given for how to remove it. ? After the gauze is removed, soak the area in warm water for 15 to 20 minutes 2 times a day, until the wound closes. When should you call for help?   Call your doctor now or seek immediate medical care if:    · You have signs of worsening infection, such as:  ? Increased pain, swelling, warmth, or redness. ? Red streaks leading from the infected skin. ? Pus draining from the wound. ? A fever.    Watch closely for changes in your health, and be sure to contact your doctor if:    · You do not get better as expected. Where can you learn more? Go to http://marie-angi.info/. Enter B989 in the search box to learn more about \"Skin Abscess: Care Instructions. \"  Current as of: April 1, 2019  Content Version: 12.1  © 7786-4498 [employer]. Care instructions adapted under license by [employer] (which disclaims liability or warranty for this information). If you have questions about a medical condition or this instruction, always ask your healthcare professional. [address] any warranty or liability for your use of this information.

## 2019-07-25 NOTE — PROGRESS NOTES
HISTORY OF PRESENT ILLNESS  Calli Orellana is a 53 y.o. male. HPI group home    Pt present for f/u the patient was started on Abx high dose for the next 10 days, in addition patient was advised to wash bid with soaps and water, multiple hand washing, medications side effects was addressed, also  was told to RTC in 10 days for the progress, or call if not better, today pt states that it did opened up drained sign and has been doing DSNG changes daily including today and so far happy with progress and has been compliant with meds and medical advised, unfortunately the infection site not all gone away at this time no drainage     Current Outpatient Medications   Medication Sig Dispense Refill    docusate sodium (COLACE) 100 mg capsule TAKE (1) CAPSULE BY MOUTH TWICE DAILY 60 Cap PRN    montelukast (SINGULAIR) 10 mg tablet TAKE 1 TABLET BY MOUTH DAILY ** OK TO CRUSH AND GIVE MEDS** 30 Tab PRN    furosemide (LASIX) 20 mg tablet One tab daily 30 Tab 0    magnesium hydroxide (GARIBAY MILK OF MAGNESIA) 400 mg/5 mL suspension Take 30 mL by mouth nightly as needed for Constipation. Give ONLY if patient has  no bowel movement in three days. 354 mL 1    polyethylene glycol (MIRALAX) 17 gram packet Take 1 Packet by mouth daily.  Hold for loose stool 30 Each 12    lisinopril (PRINIVIL, ZESTRIL) 40 mg tablet Take 1 tab daily, Hold meds if systolic DV<36,WW diastolic GR<56,& if YC<21KTU,UMBA MD if systolic OA>348,BD diastolic PD>35 & if YO>904 bpm 90 Tab 1    cloNIDine HCl (CATAPRES) 0.2 mg tablet TAKE ONE TABLET BY MOUTH AT BEDTIME  OK TO CRUSH AND GIVE MEDS    Hold bp meds if systolic bp <40,  if diastolic bp <45, and if HR <60bpm, call MD if systolic BP > 607,  if diastolic bp >30 and if HR >100 bpm  Indications: Attention Deficit Disorder with Hyperactivity, high blood pressure 90 Tab 2    nebivolol (BYSTOLIC) 10 mg tablet TAKE 1 TABLET BY MOUTH TWICE A DAY  OK TO CRUSH AND GIVE MEDS,  Hold bp meds if systolic bp <20,  if diastolic bp <08, and if HR <60bpm, call MD if systolic BP > 733,  if diastolic bp >90 and if HR >100 bpm 60 Tab 6    potassium chloride SR (KLOR-CON 10) 10 mEq tablet TAKE 1 TABLET BY MOUTH DAILY DO NOT HU MUST SWALLOW WHOLE 28 Tab PRN    cetirizine (ZYRTEC) 10 mg tablet TAKE 1 TABLET BY MOUTH DAILY FOR ALLERGIES ** OK TO CRUSH AND GIVE MEDS** 30 Tab PRN    omeprazole (PRILOSEC) 40 mg capsule TAKE 1 CAPSULE BY MOUTH EACH MORNING 30 MINUTES BEFORE BREAKFAST **OKTO OPEN CAPSULE AND GIVE CONTENTS** 30 Cap PRN    acetaminophen (TYLENOL) 325 mg tablet Take  by mouth every four (4) hours as needed for Pain.  fluticasone (FLONASE) 50 mcg/actuation nasal spray BLOW NOSE: 2 SPRAYS IN EACH NOSTRIL DAILY FOR ALLERGY. 16 g 11    OTHER Pardeep's baby shampoo. use as lid scrub to each eye once daily      ciclopirox (PENLAC) 8 % solution APPLY TO AFFECTED AREA TWICE DAILY EXTERNALLY AS DIRECTED. 6.6 mL 11    Miscellaneous Medical Supply (OCUSOFT EYELID CLEANSING PADS) pads by Does Not Apply route.  alcaftadine (LASTACAFT) 0.25 % drop Apply  to eye.  albuterol (PROVENTIL HFA, VENTOLIN HFA, PROAIR HFA) 90 mcg/actuation inhaler Take 1 puff by inhalation every four (4) hours as needed for Wheezing.  1 Inhaler 1     Allergies   Allergen Reactions    Zofran [Ondansetron Hcl] Itching     Arm turned red & itched    No Known Allergies Hives     Past Medical History:   Diagnosis Date    Acute pharyngitis 9/29/2014    Agitation requiring sedation protocol 4/11/2018    Bronchitis, acute 12/12/2013    Cellulitis 7/26/2018    Cellulitis of groin 3/23/2016    Chronic rhinitis 4/11/2018    Contact dermatitis and other eczema, due to unspecified cause     Decrease in appetite 12/12/2013    Diabetes ([facility]     Dysphagia 10/22/2014    Fall at nursing home 7/24/2017    Fever in adult 9/29/2014    Hypertension     Inguinal bulge 4/11/2018    Mental retardation     Prediabetes 10/2/2014    Screening for glaucoma 12/3/2014    Seizure ([address] 11/27/2017    Stool incontinence 4/11/2018     Past Surgical History:   Procedure Laterality Date    HX HEENT      dental surg. June 7, 2010     Family History   Family history unknown: Yes     Social History     Tobacco Use    Smoking status: Never Smoker    Smokeless tobacco: Never Used   Substance Use Topics    Alcohol use: No      Lab Results   Component Value Date/Time    Cholesterol, total 160 03/28/2018 03:18 PM    HDL Cholesterol 42 11/08/2018 02:12 PM    LDL, calculated 73 08/12/2013 10:55 AM    Triglyceride 95 08/12/2013 10:55 AM    CHOL/HDL Ratio 3.6 08/25/2010 12:03 PM        Review of Systems   Constitutional: Negative for chills and fever. HENT: Negative for ear pain and nosebleeds. Eyes: Negative for blurred vision, pain and discharge. Respiratory: Negative for shortness of breath. Cardiovascular: Negative for chest pain and leg swelling. Gastrointestinal: Negative for constipation, diarrhea, nausea and vomiting. Genitourinary: Negative for frequency. Musculoskeletal: Negative for joint pain. Skin: Negative for itching and rash. Neurological: Negative for headaches. Psychiatric/Behavioral: Negative for depression. The patient is not nervous/anxious. Physical Exam   Constitutional: He is oriented to person, place, and time. He appears well-developed and well-nourished. HENT:   Head: Normocephalic and atraumatic. Mouth/Throat: No oropharyngeal exudate. Eyes: Conjunctivae and EOM are normal.   Neck: Normal range of motion. Neck supple. Cardiovascular: Normal rate, regular rhythm and normal heart sounds. No murmur heard. Pulmonary/Chest: Effort normal and breath sounds normal. No respiratory distress. Abdominal: Soft. Bowel sounds are normal. He exhibits no distension. There is no rebound. Musculoskeletal: He exhibits no edema or tenderness. Neurological: He is alert and oriented to person, place, and time.    Skin: Skin is warm. No erythema. Psychiatric: He has a normal mood and affect. His behavior is normal.   Nursing note and vitals reviewed. ASSESSMENT and PLAN  Diagnoses and all orders for this visit:    1.  Cellulitis of groin     resolved nicely, Will stop the abx,  advise to wash bid with soaps and water, multiple hand washing, medications side effects was addressed, was told to RTC or call if not better

## 2019-07-25 NOTE — PROGRESS NOTES
Chief Complaint   Patient presents with    Skin Problem     left groin abscess follow up     1. Have you been to the ER, urgent care clinic since your last visit? Hospitalized since your last visit? No    2. Have you seen or consulted any other health care providers outside of the [address] [place] [address] since your last visit? Include any pap smears or colon screening. No      Came in today with Josefina, ,  Stated pts abscess is much better,  No drainage.

## 2019-11-04 ENCOUNTER — OFFICE VISIT (OUTPATIENT)
Dept: FAMILY MEDICINE CLINIC | Age: 44
End: 2019-11-04

## 2019-11-04 VITALS
WEIGHT: 141.9 LBS | HEART RATE: 68 BPM | HEIGHT: 64 IN | SYSTOLIC BLOOD PRESSURE: 183 MMHG | TEMPERATURE: 95.6 F | RESPIRATION RATE: 18 BRPM | BODY MASS INDEX: 24.22 KG/M2 | DIASTOLIC BLOOD PRESSURE: 93 MMHG

## 2019-11-04 DIAGNOSIS — L02.214 CUTANEOUS ABSCESS OF GROIN: Primary | ICD-10-CM

## 2019-11-04 DIAGNOSIS — Z23 ENCOUNTER FOR IMMUNIZATION: ICD-10-CM

## 2019-11-04 RX ORDER — CIPROFLOXACIN 500 MG/1
500 TABLET ORAL 2 TIMES DAILY
Qty: 20 TAB | Refills: 0 | Status: SHIPPED | OUTPATIENT
Start: 2019-11-04 | End: 2019-11-14

## 2019-11-04 RX ORDER — AMOXICILLIN AND CLAVULANATE POTASSIUM 875; 125 MG/1; MG/1
1 TABLET, FILM COATED ORAL 2 TIMES DAILY
Qty: 20 TAB | Refills: 0 | Status: SHIPPED | OUTPATIENT
Start: 2019-11-04 | End: 2019-11-14

## 2019-11-04 NOTE — PROGRESS NOTES
HISTORY OF PRESENT ILLNESS  Rupinder Bernabe is a 53 y.o. male. HPI   Rash on the rt groin  Started few days ago not better, the patient has tried alcohol washing and OTC antibiotic ointments,  no family member have the same problem, it does tingles and it is pain full, states that is expanding red, and is swelled up, like a lump      Current Outpatient Medications   Medication Sig Dispense Refill    ciprofloxacin HCl (CIPRO) 500 mg tablet Take 1 Tab by mouth two (2) times a day for 10 days. 20 Tab 0    amoxicillin-clavulanate (AUGMENTIN) 875-125 mg per tablet Take 1 Tab by mouth two (2) times a day for 10 days. 20 Tab 0    docusate sodium (COLACE) 100 mg capsule TAKE (1) CAPSULE BY MOUTH TWICE DAILY 60 Cap PRN    montelukast (SINGULAIR) 10 mg tablet TAKE 1 TABLET BY MOUTH DAILY ** OK TO CRUSH AND GIVE MEDS** 30 Tab PRN    furosemide (LASIX) 20 mg tablet One tab daily 30 Tab 0    magnesium hydroxide (GARIBAY MILK OF MAGNESIA) 400 mg/5 mL suspension Take 30 mL by mouth nightly as needed for Constipation. Give ONLY if patient has  no bowel movement in three days. 354 mL 1    polyethylene glycol (MIRALAX) 17 gram packet Take 1 Packet by mouth daily.  Hold for loose stool 30 Each 12    lisinopril (PRINIVIL, ZESTRIL) 40 mg tablet Take 1 tab daily, Hold meds if systolic YH<54,OD diastolic BH<05,& if VT<06VTA,DMPD MD if systolic IR>588,LE diastolic VQ>40 & if UO>707 bpm 90 Tab 1    cloNIDine HCl (CATAPRES) 0.2 mg tablet TAKE ONE TABLET BY MOUTH AT BEDTIME  OK TO CRUSH AND GIVE MEDS    Hold bp meds if systolic bp <12,  if diastolic bp <54, and if HR <60bpm, call MD if systolic BP > 764,  if diastolic bp >28 and if HR >100 bpm  Indications: Attention Deficit Disorder with Hyperactivity, high blood pressure 90 Tab 2    potassium chloride SR (KLOR-CON 10) 10 mEq tablet TAKE 1 TABLET BY MOUTH DAILY DO NOT HU MUST SWALLOW WHOLE 28 Tab PRN    cetirizine (ZYRTEC) 10 mg tablet TAKE 1 TABLET BY MOUTH DAILY FOR ALLERGIES ** OK TO CRUSH AND GIVE MEDS** 30 Tab PRN    omeprazole (PRILOSEC) 40 mg capsule TAKE 1 CAPSULE BY MOUTH EACH MORNING 30 MINUTES BEFORE BREAKFAST **OKTO OPEN CAPSULE AND GIVE CONTENTS** 30 Cap PRN    acetaminophen (TYLENOL) 325 mg tablet Take 2 tablets by mouth every 4 hours as needed for needed      fluticasone (FLONASE) 50 mcg/actuation nasal spray BLOW NOSE: 2 SPRAYS IN EACH NOSTRIL DAILY FOR ALLERGY. 16 g 11    OTHER Pardeep's baby shampoo. use as lid scrub to each eye once daily      nebivolol (BYSTOLIC) 10 mg tablet TAKE 1 TABLET BY MOUTH TWICE A DAY  OK TO CRUSH AND GIVE MEDS,  Hold bp meds if systolic bp <57,  if diastolic bp <81, and if HR <60bpm, call MD if systolic BP > 221,  if diastolic bp >85 and if HR >100 bpm 60 Tab 6    ciclopirox (PENLAC) 8 % solution APPLY TO AFFECTED AREA TWICE DAILY EXTERNALLY AS DIRECTED. 6.6 mL 11    Miscellaneous Medical Supply (OCUSOFT EYELID CLEANSING PADS) pads by Does Not Apply route.  alcaftadine (LASTACAFT) 0.25 % drop Apply  to eye.  albuterol (PROVENTIL HFA, VENTOLIN HFA, PROAIR HFA) 90 mcg/actuation inhaler Take 1 puff by inhalation every four (4) hours as needed for Wheezing.  1 Inhaler 1     Allergies   Allergen Reactions    Zofran [Ondansetron Hcl] Itching     Arm turned red & itched    No Known Allergies Hives     Past Medical History:   Diagnosis Date    Acute pharyngitis 9/29/2014    Agitation requiring sedation protocol 4/11/2018    Bronchitis, acute 12/12/2013    Cellulitis 7/26/2018    Cellulitis of groin 3/23/2016    Chronic rhinitis 4/11/2018    Contact dermatitis and other eczema, due to unspecified cause     Decrease in appetite 12/12/2013    Diabetes ([facility] [address]     Dysphagia 10/22/2014    Fall at nursing home 7/24/2017    Fever in adult 9/29/2014    Hypertension     Inguinal bulge 4/11/2018    Mental retardation     Prediabetes 10/2/2014    Screening for glaucoma 12/3/2014    Seizure ([facility] [address] 11/27/2017    Stool incontinence 4/11/2018     Past Surgical History:   Procedure Laterality Date    HX HEENT      dental surg. June 7, 2010     Family History   Family history unknown: Yes     Social History     Tobacco Use    Smoking status: Never Smoker    Smokeless tobacco: Never Used   Substance Use Topics    Alcohol use: No      Lab Results   Component Value Date/Time    WBC 3.8 02/13/2019 01:45 PM    HGB 12.4 (L) 02/13/2019 01:45 PM    HCT 37.6 02/13/2019 01:45 PM    PLATELET 811 (L) 97/80/3603 01:45 PM    MCV 91 02/13/2019 01:45 PM     Lab Results   Component Value Date/Time    TSH 1.560 02/13/2019 01:45 PM         Review of Systems   Unable to perform ROS: Mental acuity       Physical Exam   Constitutional: He is oriented to person, place, and time. He appears well-developed and well-nourished. HENT:   Head: Normocephalic and atraumatic. Mouth/Throat: No oropharyngeal exudate. Eyes: Conjunctivae and EOM are normal.   Neck: Normal range of motion. Neck supple. Cardiovascular: Normal rate, regular rhythm and normal heart sounds. No murmur heard. Pulmonary/Chest: Effort normal and breath sounds normal. No respiratory distress. Abdominal: Soft. Bowel sounds are normal. He exhibits no distension. There is no rebound. Musculoskeletal:         General: No tenderness or edema. Neurological: He is alert and oriented to person, place, and time. Skin: Skin is warm. Rash noted. There is erythema. Psychiatric: He has a normal mood and affect. His behavior is normal.   Nursing note and vitals reviewed. ASSESSMENT and PLAN  Diagnoses and all orders for this visit:    1. Cutaneous abscess of groin  -     ciprofloxacin HCl (CIPRO) 500 mg tablet; Take 1 Tab by mouth two (2) times a day for 10 days. -     amoxicillin-clavulanate (AUGMENTIN) 875-125 mg per tablet;  Take 1 Tab by mouth two (2) times a day for 10 days.  -     INFLUENZA VIRUS VACCINE, HIGH DOSE SEASONAL, PRESERVATIVE FREE  -     ADMIN INFLUENZA VIRUS VAC  - AEROBIC BACTERIAL CULTURE    2.  Encounter for immunization  -     INFLUENZA VIRUS VAC QUAD,SPLIT,PRESV FREE SYRINGE IM  -     WI IMMUNIZ ADMIN,1 SINGLE/COMB VAC/TOXOID

## 2019-11-04 NOTE — PROGRESS NOTES
Name and  verified      Chief Complaint   Patient presents with    Skin Problem     Abscess in [facility] reviewed-discussed with patient. 1. Have you been to the ER, urgent care clinic since your last visit? Hospitalized since your last visit? no    2. Have you seen or consulted any other health care providers outside of the [address] [place] [address] since your last visit? Include any pap smears or colon screening. No      Order placed for flu vaccine, per Verbal Order from Dr. Mendoza Mckeon on 2019 due to HM. After obtaining consent, and per orders of Dr Mendoza Mckeon, flu vaccine was given to  Left deltoid. Patient was instructed to remain in clinic for 15 minutes afterwards, and to report any adverse reaction to me immediately. Patient did not have any adverse reactions during this office visit.

## 2019-11-06 LAB — BACTERIA SPEC AEROBE CULT: NORMAL

## 2019-12-12 ENCOUNTER — OFFICE VISIT (OUTPATIENT)
Dept: FAMILY MEDICINE CLINIC | Age: 44
End: 2019-12-12

## 2019-12-12 VITALS
HEART RATE: 78 BPM | SYSTOLIC BLOOD PRESSURE: 122 MMHG | OXYGEN SATURATION: 93 % | TEMPERATURE: 97.4 F | DIASTOLIC BLOOD PRESSURE: 78 MMHG

## 2019-12-12 DIAGNOSIS — L02.214 CUTANEOUS ABSCESS OF GROIN: Primary | ICD-10-CM

## 2019-12-12 RX ORDER — SULFAMETHOXAZOLE AND TRIMETHOPRIM 800; 160 MG/1; MG/1
1 TABLET ORAL 2 TIMES DAILY
COMMUNITY
End: 2020-01-27 | Stop reason: ALTCHOICE

## 2019-12-12 RX ORDER — DOXYCYCLINE 100 MG/1
100 CAPSULE ORAL 2 TIMES DAILY
Qty: 20 CAP | Refills: 0 | Status: SHIPPED | OUTPATIENT
Start: 2019-12-12 | End: 2019-12-22

## 2019-12-12 NOTE — PROGRESS NOTES
Chief Complaint   Patient presents with   [facility] Follow Up     F/U from [facility] for cellulitis on farhan legs. 1. Have you been to the ER, urgent care clinic since your last visit? Hospitalized since your last visit? Yes, [facility] on 12-7-19.    2. Have you seen or consulted any other health care providers outside of the [address] [place] [address] since your last visit? Include any pap smears or colon screening.  No

## 2019-12-12 NOTE — PROGRESS NOTES
HISTORY OF PRESENT ILLNESS  Milton Sawant is a 53 y.o. male. HPI   Rash on the rt leg multiple pt givne bactrim but not getting better   Started few days ago not better, the patient has tried alcohol washing and OTC antibiotic ointments,  no family member have the same problem, it does tingles and it is pain full, states that is expanding red, and is swelled up, like a lump      Current Outpatient Medications   Medication Sig Dispense Refill    trimethoprim-sulfamethoxazole (BACTRIM DS) 160-800 mg per tablet Take 1 Tab by mouth two (2) times a day.  docusate sodium (COLACE) 100 mg capsule TAKE (1) CAPSULE BY MOUTH TWICE DAILY 60 Cap PRN    montelukast (SINGULAIR) 10 mg tablet TAKE 1 TABLET BY MOUTH DAILY ** OK TO CRUSH AND GIVE MEDS** 30 Tab PRN    furosemide (LASIX) 20 mg tablet One tab daily 30 Tab 0    magnesium hydroxide (GARIBAY MILK OF MAGNESIA) 400 mg/5 mL suspension Take 30 mL by mouth nightly as needed for Constipation. Give ONLY if patient has  no bowel movement in three days. 354 mL 1    polyethylene glycol (MIRALAX) 17 gram packet Take 1 Packet by mouth daily.  Hold for loose stool 30 Each 12    lisinopril (PRINIVIL, ZESTRIL) 40 mg tablet Take 1 tab daily, Hold meds if systolic YH<79,UT diastolic ML<31,& if CH<71TKH,UAEW MD if systolic YJ>216,NG diastolic KW>62 & if JG>704 bpm 90 Tab 1    cloNIDine HCl (CATAPRES) 0.2 mg tablet TAKE ONE TABLET BY MOUTH AT BEDTIME  OK TO CRUSH AND GIVE MEDS    Hold bp meds if systolic bp <51,  if diastolic bp <93, and if HR <60bpm, call MD if systolic BP > 693,  if diastolic bp >44 and if HR >100 bpm  Indications: Attention Deficit Disorder with Hyperactivity, high blood pressure 90 Tab 2    nebivolol (BYSTOLIC) 10 mg tablet TAKE 1 TABLET BY MOUTH TWICE A DAY  OK TO CRUSH AND GIVE MEDS,  Hold bp meds if systolic bp <89,  if diastolic bp <21, and if HR <60bpm, call MD if systolic BP > 809,  if diastolic bp >73 and if HR >100 bpm 60 Tab 6    potassium chloride SR (KLOR-CON 10) 10 mEq tablet TAKE 1 TABLET BY MOUTH DAILY DO NOT HU MUST SWALLOW WHOLE 28 Tab PRN    cetirizine (ZYRTEC) 10 mg tablet TAKE 1 TABLET BY MOUTH DAILY FOR ALLERGIES ** OK TO CRUSH AND GIVE MEDS** 30 Tab PRN    omeprazole (PRILOSEC) 40 mg capsule TAKE 1 CAPSULE BY MOUTH EACH MORNING 30 MINUTES BEFORE BREAKFAST **OKTO OPEN CAPSULE AND GIVE CONTENTS** 30 Cap PRN    acetaminophen (TYLENOL) 325 mg tablet Take 2 tablets by mouth every 4 hours as needed for needed      fluticasone (FLONASE) 50 mcg/actuation nasal spray BLOW NOSE: 2 SPRAYS IN EACH NOSTRIL DAILY FOR ALLERGY. 16 g 11    OTHER Pardeep's baby shampoo. use as lid scrub to each eye once daily      ciclopirox (PENLAC) 8 % solution APPLY TO AFFECTED AREA TWICE DAILY EXTERNALLY AS DIRECTED. 6.6 mL 11    Miscellaneous Medical Supply (OCUSOFT EYELID CLEANSING PADS) pads by Does Not Apply route.  alcaftadine (LASTACAFT) 0.25 % drop Apply  to eye.  albuterol (PROVENTIL HFA, VENTOLIN HFA, PROAIR HFA) 90 mcg/actuation inhaler Take 1 puff by inhalation every four (4) hours as needed for Wheezing.  1 Inhaler 1     Allergies   Allergen Reactions    Zofran [Ondansetron Hcl] Itching     Arm turned red & itched    No Known Allergies Hives     Past Medical History:   Diagnosis Date    Acute pharyngitis 9/29/2014    Agitation requiring sedation protocol 4/11/2018    Bronchitis, acute 12/12/2013    Cellulitis 7/26/2018    Cellulitis of groin 3/23/2016    Chronic rhinitis 4/11/2018    Contact dermatitis and other eczema, due to unspecified cause     Decrease in appetite 12/12/2013    Diabetes ([facility] [address]     Dysphagia 10/22/2014    Fall at nursing home 7/24/2017    Fever in adult 9/29/2014    Hypertension     Inguinal bulge 4/11/2018    Mental retardation     Prediabetes 10/2/2014    Screening for glaucoma 12/3/2014    Seizure ([facility] [address] 11/27/2017    Stool incontinence 4/11/2018     Past Surgical History:   Procedure Laterality Date    HX HEENT      dental surg. June 7, 2010     Family History   Family history unknown: Yes     Social History     Tobacco Use    Smoking status: Never Smoker    Smokeless tobacco: Never Used   Substance Use Topics    Alcohol use: No      Lab Results   Component Value Date/Time    WBC 3.8 02/13/2019 01:45 PM    HGB 12.4 (L) 02/13/2019 01:45 PM    HCT 37.6 02/13/2019 01:45 PM    PLATELET 471 (L) 83/66/2890 01:45 PM    MCV 91 02/13/2019 01:45 PM     Lab Results   Component Value Date/Time    Hemoglobin A1c 6.0 (H) 07/24/2017 01:13 PM    Hemoglobin A1c 6.0 (H) 12/01/2016 10:27 AM    Hemoglobin A1c 6.1 (H) 02/24/2014 04:08 PM    Glucose 140 (H) 02/13/2019 01:45 PM    Microalb/Creat ratio (ug/mg creat.) 7.6 07/25/2017 04:30 PM    LDL, calculated 73 08/12/2013 10:55 AM    Creatinine 1.00 02/13/2019 01:45 PM         Review of Systems   Unable to perform ROS: Medical condition   Skin: Positive for rash. Physical Exam  Vitals signs and nursing note reviewed. Constitutional:       Appearance: He is well-developed. HENT:      Head: Normocephalic and atraumatic. Mouth/Throat:      Pharynx: No oropharyngeal exudate. Eyes:      Conjunctiva/sclera: Conjunctivae normal.   Neck:      Musculoskeletal: Normal range of motion and neck supple. Cardiovascular:      Rate and Rhythm: Normal rate and regular rhythm. Heart sounds: Normal heart sounds. No murmur. Pulmonary:      Effort: Pulmonary effort is normal. No respiratory distress. Breath sounds: Normal breath sounds. Abdominal:      General: Bowel sounds are normal. There is no distension. Palpations: Abdomen is soft. Tenderness: There is no rebound. Musculoskeletal:         General: No tenderness. Skin:     General: Skin is warm. Findings: Erythema and lesion present. Neurological:      Mental Status: He is alert and oriented to person, place, and time.    Psychiatric:         Behavior: Behavior normal.         ASSESSMENT and PLAN  Diagnoses and all orders for this visit:    1. Cutaneous abscess of groin  -     doxycycline (VIBRAMYCIN) 100 mg capsule;  Take 1 Cap by mouth two (2) times a day for 10 days.  -     AEROBIC BACTERIAL CULTURE    Will start on Abx high dose for the next 10 days, advise to wash bid with soaps and water, multiple hand washing, medications side effects was addressed, was told to RTC or call if not better

## 2019-12-12 NOTE — PATIENT INSTRUCTIONS
Skin Abscess: Care Instructions  Your Care Instructions    A skin abscess is a bacterial infection that forms a pocket of pus. A boil is a kind of skin abscess. The doctor may have cut an opening in the abscess so that the pus can drain out. You may have gauze in the cut so that the abscess will stay open and keep draining. You may need antibiotics. You will need to follow up with your doctor to make sure the infection has gone away. The doctor has checked you carefully, but problems can develop later. If you notice any problems or new symptoms, get medical treatment right away. Follow-up care is a key part of your treatment and safety. Be sure to make and go to all appointments, and call your doctor if you are having problems. It's also a good idea to know your test results and keep a list of the medicines you take. How can you care for yourself at home? · Apply warm and dry compresses, a heating pad set on low, or a hot water bottle 3 or 4 times a day for pain. Keep a cloth between the heat source and your skin. · If your doctor prescribed antibiotics, take them as directed. Do not stop taking them just because you feel better. You need to take the full course of antibiotics. · Take pain medicines exactly as directed. ? If the doctor gave you a prescription medicine for pain, take it as prescribed. ? If you are not taking a prescription pain medicine, ask your doctor if you can take an over-the-counter medicine. · Keep your bandage clean and dry. Change the bandage whenever it gets wet or dirty, or at least one time a day. · If the abscess was packed with gauze:  ? Keep follow-up appointments to have the gauze changed or removed. If the doctor instructed you to remove the gauze, follow the instructions you were given for how to remove it. ? After the gauze is removed, soak the area in warm water for 15 to 20 minutes 2 times a day, until the wound closes. When should you call for help?   Call your doctor now or seek immediate medical care if:    · You have signs of worsening infection, such as:  ? Increased pain, swelling, warmth, or redness. ? Red streaks leading from the infected skin. ? Pus draining from the wound. ? A fever.    Watch closely for changes in your health, and be sure to contact your doctor if:    · You do not get better as expected. Where can you learn more? Go to http://marie-angi.info/. Enter Z046 in the search box to learn more about \"Skin Abscess: Care Instructions. \"  Current as of: April 1, 2019  Content Version: 12.2  © 7763-5566 [employer]. Care instructions adapted under license by [employer] (which disclaims liability or warranty for this information). If you have questions about a medical condition or this instruction, always ask your healthcare professional. [address] any warranty or liability for your use of this information. Cellulitis: Care Instructions  Your Care Instructions    Cellulitis is a skin infection caused by bacteria, most often strep or staph. It often occurs after a break in the skin from a scrape, cut, bite, or puncture, or after a rash. Cellulitis may be treated without doing tests to find out what caused it. But your doctor may do tests, if needed, to look for a specific bacteria, like methicillin-resistant Staphylococcus aureus (MRSA). The doctor has checked you carefully, but problems can develop later. If you notice any problems or new symptoms, get medical treatment right away. Follow-up care is a key part of your treatment and safety. Be sure to make and go to all appointments, and call your doctor if you are having problems. It's also a good idea to know your test results and keep a list of the medicines you take. How can you care for yourself at home? · Take your antibiotics as directed. Do not stop taking them just because you feel better.  You need to take the full course of antibiotics. · Prop up the infected area on pillows to reduce pain and swelling. Try to keep the area above the level of your heart as often as you can. · If your doctor told you how to care for your wound, follow your doctor's instructions. If you did not get instructions, follow this general advice:  ? Wash the wound with clean water 2 times a day. Don't use hydrogen peroxide or alcohol, which can slow healing. ? You may cover the wound with a thin layer of petroleum jelly, such as Vaseline, and a nonstick bandage. ? Apply more petroleum jelly and replace the bandage as needed. · Be safe with medicines. Take pain medicines exactly as directed. ? If the doctor gave you a prescription medicine for pain, take it as prescribed. ? If you are not taking a prescription pain medicine, ask your doctor if you can take an over-the-counter medicine. To prevent cellulitis in the future  · Try to prevent cuts, scrapes, or other injuries to your skin. Cellulitis most often occurs where there is a break in the skin. · If you get a scrape, cut, mild burn, or bite, wash the wound with clean water as soon as you can to help avoid infection. Don't use hydrogen peroxide or alcohol, which can slow healing. · If you have swelling in your legs (edema), support stockings and good skin care may help prevent leg sores and cellulitis. · Take care of your feet, especially if you have diabetes or other conditions that increase the risk of infection. Wear shoes and socks. Do not go barefoot. If you have athlete's foot or other skin problems on your feet, talk to your doctor about how to treat them. When should you call for help? Call your doctor now or seek immediate medical care if:    · You have signs that your infection is getting worse, such as:  ? Increased pain, swelling, warmth, or redness. ? Red streaks leading from the area. ? Pus draining from the area.   ? A fever.     · You get a rash.    Watch closely for changes in your health, and be sure to contact your doctor if:    · You do not get better as expected. Where can you learn more? Go to http://marie-angi.info/. Arian Bowels in the search box to learn more about \"Cellulitis: Care Instructions. \"  Current as of: April 1, 2019  Content Version: 12.2  © 9028-3943 [employer]. Care instructions adapted under license by [employer] (which disclaims liability or warranty for this information). If you have questions about a medical condition or this instruction, always ask your healthcare professional. [place] [address] any warranty or liability for your use of this information.

## 2019-12-17 LAB — BACTERIA SPEC AEROBE CULT: ABNORMAL

## 2020-01-27 ENCOUNTER — OFFICE VISIT (OUTPATIENT)
Dept: FAMILY MEDICINE CLINIC | Age: 45
End: 2020-01-27

## 2020-01-27 VITALS
BODY MASS INDEX: 25.4 KG/M2 | RESPIRATION RATE: 20 BRPM | SYSTOLIC BLOOD PRESSURE: 176 MMHG | HEIGHT: 64 IN | WEIGHT: 148.8 LBS | TEMPERATURE: 95.1 F | HEART RATE: 62 BPM | DIASTOLIC BLOOD PRESSURE: 106 MMHG

## 2020-01-27 DIAGNOSIS — E11.9 DIABETES MELLITUS TYPE 2, DIET-CONTROLLED (HCC): ICD-10-CM

## 2020-01-27 DIAGNOSIS — I10 HYPERTENSION, UNSPECIFIED TYPE: ICD-10-CM

## 2020-01-27 DIAGNOSIS — J21.9 ACUTE BRONCHIOLITIS DUE TO UNSPECIFIED ORGANISM: Primary | ICD-10-CM

## 2020-01-27 RX ORDER — AZITHROMYCIN 250 MG/1
TABLET, FILM COATED ORAL
Qty: 6 TAB | Refills: 0 | Status: SHIPPED | OUTPATIENT
Start: 2020-01-27 | End: 2021-02-09 | Stop reason: ALTCHOICE

## 2020-01-27 RX ORDER — ALBUTEROL SULFATE 90 UG/1
1 AEROSOL, METERED RESPIRATORY (INHALATION)
Qty: 1 INHALER | Refills: 1 | Status: SHIPPED | OUTPATIENT
Start: 2020-01-27 | End: 2021-02-09 | Stop reason: SDUPTHER

## 2020-01-27 NOTE — PROGRESS NOTES
Name and  verified      Chief Complaint   Patient presents with    Hypertension     6 month follow up    Nasal Congestion     for 1.5 weeks     Blood pressure elevated. Dr Corey Avendano notified. Will recheck blood pressure      Health Maintenance reviewed-discussed with patient. 1. Have you been to the ER, urgent care clinic since your last visit? Hospitalized since your last visit? no    2. Have you seen or consulted any other health care providers outside of the [address] [place] [address] since your last visit? Include any pap smears or colon screening.   no

## 2020-01-27 NOTE — PROGRESS NOTES
HISTORY OF PRESENT ILLNESS  Fabiano Mccord is a 53 y.o. male. HPI   Upper respiratory problem    Started >14 days ago not better,   otc not helping, have no Sore throat, with a lot of Cough which are Productive yellowish , no hx of asthm,, there has been a lot of decrease in the patient's sleep pattern, in addition there has been some muscle ache responding to OTC , no diarhea, no ear ache,also there has been a decrease in the appetite, has been had an exposure to sick person,    DMtype II diet controlled, pt is very challenging and not allowing the NS to approach him to obtain a nl bp,  Patient also present for diabetic check,  the patient has been compliancy w/ a diabetic diet, and eat less of carbohydrates, since last visit patient has been more active with daily walking, patient also states that there is no home monitoring glucose device    This time patient denies  tingling sensation, has no polyurea and polydipsia, last a1c was at target of5.9% in 2018 %       HTN  Today pt present for Bp check and pt has had Compliancy w/ the bp meds, having had the low salt diet , pt with mobility disorder and not been active, patien does not obtain the bp at assisted living, as per the 2 providers with the pt,  denies Chest Pain, has no legs swelling no lightheadedness,        Current Outpatient Medications   Medication Sig Dispense Refill    azithromycin (ZITHROMAX) 250 mg tablet 2 first day then one tab daily till finished 6 Tab 0    albuterol (PROVENTIL HFA, VENTOLIN HFA, PROAIR HFA) 90 mcg/actuation inhaler Take 1 Puff by inhalation every four (4) hours as needed for Wheezing.  1 Inhaler 1    docusate sodium (COLACE) 100 mg capsule TAKE (1) CAPSULE BY MOUTH TWICE DAILY 60 Cap PRN    montelukast (SINGULAIR) 10 mg tablet TAKE 1 TABLET BY MOUTH DAILY ** OK TO CRUSH AND GIVE MEDS** 30 Tab PRN    furosemide (LASIX) 20 mg tablet One tab daily 30 Tab 0    magnesium hydroxide (GARIBAY MILK OF MAGNESIA) 400 mg/5 mL suspension Take 30 mL by mouth nightly as needed for Constipation. Give ONLY if patient has  no bowel movement in three days. 354 mL 1    polyethylene glycol (MIRALAX) 17 gram packet Take 1 Packet by mouth daily. Hold for loose stool 30 Each 12    lisinopril (PRINIVIL, ZESTRIL) 40 mg tablet Take 1 tab daily, Hold meds if systolic ZZ<20,BD diastolic XA<29,& if ZM<77UAE,XVLW MD if systolic JR>999,UK diastolic YA>50 & if FK>934 bpm 90 Tab 1    cloNIDine HCl (CATAPRES) 0.2 mg tablet TAKE ONE TABLET BY MOUTH AT BEDTIME  OK TO CRUSH AND GIVE MEDS    Hold bp meds if systolic bp <85,  if diastolic bp <31, and if HR <60bpm, call MD if systolic BP > 361,  if diastolic bp >94 and if HR >100 bpm  Indications: Attention Deficit Disorder with Hyperactivity, high blood pressure 90 Tab 2    nebivolol (BYSTOLIC) 10 mg tablet TAKE 1 TABLET BY MOUTH TWICE A DAY  OK TO CRUSH AND GIVE MEDS,  Hold bp meds if systolic bp <43,  if diastolic bp <51, and if HR <60bpm, call MD if systolic BP > 366,  if diastolic bp >31 and if HR >100 bpm 60 Tab 6    potassium chloride SR (KLOR-CON 10) 10 mEq tablet TAKE 1 TABLET BY MOUTH DAILY DO NOT HU MUST SWALLOW WHOLE 28 Tab PRN    cetirizine (ZYRTEC) 10 mg tablet TAKE 1 TABLET BY MOUTH DAILY FOR ALLERGIES ** OK TO CRUSH AND GIVE MEDS** 30 Tab PRN    omeprazole (PRILOSEC) 40 mg capsule TAKE 1 CAPSULE BY MOUTH EACH MORNING 30 MINUTES BEFORE BREAKFAST **OKTO OPEN CAPSULE AND GIVE CONTENTS** 30 Cap PRN    acetaminophen (TYLENOL) 325 mg tablet Take 2 tablets by mouth every 4 hours as needed for needed      fluticasone (FLONASE) 50 mcg/actuation nasal spray BLOW NOSE: 2 SPRAYS IN EACH NOSTRIL DAILY FOR ALLERGY. 16 g 11    OTHER Pardeep's baby shampoo. use as lid scrub to each eye once daily      ciclopirox (PENLAC) 8 % solution APPLY TO AFFECTED AREA TWICE DAILY EXTERNALLY AS DIRECTED. 6.6 mL 11    Miscellaneous Medical Supply (OCUSOFT EYELID CLEANSING PADS) pads by Does Not Apply route.       alcaftadine (LASTACAFT) 0.25 % drop Apply  to eye.  albuterol (PROVENTIL HFA, VENTOLIN HFA, PROAIR HFA) 90 mcg/actuation inhaler Take 1 puff by inhalation every four (4) hours as needed for Wheezing. 1 Inhaler 1     Allergies   Allergen Reactions    Zofran [Ondansetron Hcl] Itching     Arm turned red & itched    No Known Allergies Hives     Past Medical History:   Diagnosis Date    Acute pharyngitis 9/29/2014    Agitation requiring sedation protocol 4/11/2018    Bronchitis, acute 12/12/2013    Cellulitis 7/26/2018    Cellulitis of groin 3/23/2016    Chronic rhinitis 4/11/2018    Contact dermatitis and other eczema, due to unspecified cause     Decrease in appetite 12/12/2013    Diabetes ([facility] [address]     Dysphagia 10/22/2014    Fall at nursing home 7/24/2017    Fever in adult 9/29/2014    Hypertension     Inguinal bulge 4/11/2018    Mental retardation     Prediabetes 10/2/2014    Screening for glaucoma 12/3/2014    Seizure ([facility] [address] 11/27/2017    Stool incontinence 4/11/2018     Past Surgical History:   Procedure Laterality Date    HX HEENT      dental surg.  June 7, 2010     Family History   Family history unknown: Yes     Social History     Tobacco Use    Smoking status: Never Smoker    Smokeless tobacco: Never Used   Substance Use Topics    Alcohol use: No      Lab Results   Component Value Date/Time    WBC 3.8 02/13/2019 01:45 PM    HGB 12.4 (L) 02/13/2019 01:45 PM    HCT 37.6 02/13/2019 01:45 PM    PLATELET 057 (L) 40/78/4400 01:45 PM    MCV 91 02/13/2019 01:45 PM     Lab Results   Component Value Date/Time    Hemoglobin A1c 6.0 (H) 07/24/2017 01:13 PM    Hemoglobin A1c 6.0 (H) 12/01/2016 10:27 AM    Hemoglobin A1c 6.1 (H) 02/24/2014 04:08 PM    Glucose 140 (H) 02/13/2019 01:45 PM    Microalb/Creat ratio (ug/mg creat.) 7.6 07/25/2017 04:30 PM    LDL, calculated 73 08/12/2013 10:55 AM    Creatinine 1.00 02/13/2019 01:45 PM      Lab Results   Component Value Date/Time    Cholesterol, total 160 2018 03:18 PM    HDL Cholesterol 42 2018 02:12 PM    LDL, calculated 73 2013 10:55 AM    Triglyceride 95 2013 10:55 AM    CHOL/HDL Ratio 3.6 2010 12:03 PM        Review of Systems   Unable to perform ROS: Mental acuity   Constitutional: Negative for chills and fever. HENT: Positive for congestion. Respiratory: Positive for cough and sputum production. Physical Exam  Vitals signs and nursing note reviewed. Constitutional:       Appearance: He is well-developed. He is obese. HENT:      Head: Normocephalic and atraumatic. Left Ear: Tenderness present. Nose: Mucosal edema, congestion and rhinorrhea present. Right Turbinates: Swollen. Left Turbinates: Swollen. Left Sinus: Maxillary sinus tenderness present. Mouth/Throat:      Mouth: Mucous membranes are dry. Pharynx: Posterior oropharyngeal erythema present. No oropharyngeal exudate. Tonsils: Swellin+ on the right. 2+ on the left. Eyes:      Conjunctiva/sclera: Conjunctivae normal.   Neck:      Musculoskeletal: Normal range of motion and neck supple. Cardiovascular:      Rate and Rhythm: Normal rate and regular rhythm. Heart sounds: Normal heart sounds. No murmur. Pulmonary:      Effort: Pulmonary effort is normal. No respiratory distress. Breath sounds: Normal breath sounds. Abdominal:      General: Bowel sounds are normal. There is no distension. Palpations: Abdomen is soft. There is mass. Tenderness: There is no rebound. Musculoskeletal:         General: No tenderness. Skin:     General: Skin is warm. Findings: No erythema. Neurological:      Mental Status: He is alert and oriented to person, place, and time. Psychiatric:         Behavior: Behavior normal.         ASSESSMENT and PLAN  Diagnoses and all orders for this visit:    1.  Acute bronchiolitis due to unspecified organism  -     azithromycin (ZITHROMAX) 250 mg tablet; 2 first day then one tab daily till finished  -     albuterol (PROVENTIL HFA, VENTOLIN HFA, PROAIR HFA) 90 mcg/actuation inhaler; Take 1 Puff by inhalation every four (4) hours as needed for Wheezing.  -     CBC W/O DIFF  -     HEMOGLOBIN A1C WITH EAG    2. [facility] type 2[facility]  -     REFERRAL TO PODIATRY  -     REFERRAL TO OPTOMETRY  -     MICROALBUMIN, UR, RAND W/ MICROALB/CREAT RATIO  -     CBC W/O DIFF  -     METABOLIC PANEL, COMPREHENSIVE  -     TSH 3RD GENERATION  -     HDL CHOLESTEROL  -     CHOLESTEROL, TOTAL  -     HEMOGLOBIN A1C WITH EAG    3.  Hypertension, unspecified type      HTN not controlled at this time secondary to the mental instability,  Will not change patient's Bp meds,  Discussed sodium restriction,  k rich diet,  maintaining ideal body weight and regular exercise program such as daily walking 30 min per day most days of the week,  medication compliance advised, was told to call back for rfs or of any concern, regardless patient blood pressure need to be checked in 7 to 14 days for better outcome patient was told to do home monitoring if blood pressure reading greater than 140/90 or less than 90/60 patient was told to call for further advice patient acknowledged understanding and agreed

## 2020-01-27 NOTE — PATIENT INSTRUCTIONS
Bronchitis: Care Instructions Your Care Instructions Bronchitis is inflammation of the bronchial tubes, which carry air to the lungs. The tubes swell and produce mucus, or phlegm. The mucus and inflamed bronchial tubes make you cough. You may have trouble breathing. Most cases of bronchitis are caused by viruses like those that cause colds. Antibiotics usually do not help and they may be harmful. Bronchitis usually develops rapidly and lasts about 2 to 3 weeks in otherwise healthy people. Follow-up care is a key part of your treatment and safety. Be sure to make and go to all appointments, and call your doctor if you are having problems. It's also a good idea to know your test results and keep a list of the medicines you take. How can you care for yourself at home? · Take all medicines exactly as prescribed. Call your doctor if you think you are having a problem with your medicine. · Get some extra rest. 
· Take an over-the-counter pain medicine, such as acetaminophen (Tylenol), ibuprofen (Advil, Motrin), or naproxen (Aleve) to reduce fever and relieve body aches. Read and follow all instructions on the label. · Do not take two or more pain medicines at the same time unless the doctor told you to. Many pain medicines have acetaminophen, which is Tylenol. Too much acetaminophen (Tylenol) can be harmful. · Take an over-the-counter cough medicine that contains dextromethorphan to help quiet a dry, hacking cough so that you can sleep. Avoid cough medicines that have more than one active ingredient. Read and follow all instructions on the label. · Breathe moist air from a humidifier, hot shower, or sink filled with hot water. The heat and moisture will thin mucus so you can cough it out. · Do not smoke. Smoking can make bronchitis worse. If you need help quitting, talk to your doctor about stop-smoking programs and medicines. These can increase your chances of quitting for good. When should you call for help? Call 911 anytime you think you may need emergency care. For example, call if: 
  · You have severe trouble breathing.  
 Call your doctor now or seek immediate medical care if: 
  · You have new or worse trouble breathing.  
  · You cough up dark brown or bloody mucus (sputum).  
  · You have a new or higher fever.  
  · You have a new rash.  
 Watch closely for changes in your health, and be sure to contact your doctor if: 
  · You cough more deeply or more often, especially if you notice more mucus or a change in the color of your mucus.  
  · You are not getting better as expected. Where can you learn more? Go to http://marie-angi.info/. Enter H333 in the search box to learn more about \"Bronchitis: Care Instructions. \" Current as of: June 9, 2019 Content Version: 12.2 © 8213-9655 [employer]. Care instructions adapted under license by [employer] (which disclaims liability or warranty for this information). If you have questions about a medical condition or this instruction, always ask your healthcare professional. [address] any warranty or liability for your use of this information. Learning About Meal Planning for Diabetes Why plan your meals? Meal planning can be a key part of managing diabetes. Planning meals and snacks with the right balance of carbohydrate, protein, and fat can help you keep your blood sugar at the target level you set with your doctor. You don't have to eat special foods. You can eat what your family eats, including sweets once in a while. But you do have to pay attention to how often you eat and how much you eat of certain foods. You may want to work with a dietitian or a certified diabetes educator. He or she can give you tips and meal ideas and can answer your questions about meal planning.  This health professional can also help you reach a healthy weight if that is one of your goals. What plan is right for you? Your dietitian or diabetes educator may suggest that you start with the plate format or carbohydrate counting. The plate format The plate format is a simple way to help you manage how you eat. You plan meals by learning how much space each food should take on a plate. Using the plate format helps you spread carbohydrate throughout the day. It can make it easier to keep your blood sugar level within your target range. It also helps you see if you're eating healthy portion sizes. To use the plate format, you put non-starchy vegetables on half your plate. Add meat or meat substitutes on one-quarter of the plate. Put a grain or starchy vegetable (such as brown rice or a potato) on the final quarter of the plate. You can add a small piece of fruit and some low-fat or fat-free milk or yogurt, depending on your carbohydrate goal for each meal. 
Here are some tips for using the plate format: · Make sure that you are not using an oversized plate. A 9-inch plate is best. Many restaurants use larger plates. · Get used to using the plate format at home. Then you can use it when you eat out. · Write down your questions about using the plate format. Talk to your doctor, a dietitian, or a diabetes educator about your concerns. Carbohydrate counting With carbohydrate counting, you plan meals based on the amount of carbohydrate in each food. Carbohydrate raises blood sugar higher and more quickly than any other nutrient. It is found in desserts, breads and cereals, and fruit. It's also found in starchy vegetables such as potatoes and corn, grains such as rice and pasta, and milk and yogurt. Spreading carbohydrate throughout the day helps keep your blood sugar levels within your target range.  
Your daily amount depends on several things, including your weight, how active you are, which diabetes medicines you take, and what your goals are for your blood sugar levels. A registered dietitian or diabetes educator can help you plan how much carbohydrate to include in each meal and snack. A guideline for your daily amount of carbohydrate is: · 45 to 60 grams at each meal. That's about the same as 3 to 4 carbohydrate servings. · 15 to 20 grams at each snack. That's about the same as 1 carbohydrate serving. The Nutrition Facts label on packaged foods tells you how much carbohydrate is in a serving of the food. First, look at the serving size on the food label. Is that the amount you eat in a serving? All of the nutrition information on a food label is based on that serving size. So if you eat more or less than that, you'll need to adjust the other numbers. Total carbohydrate is the next thing you need to look for on the label. If you count carbohydrate servings, one serving of carbohydrate is 15 grams. For foods that don't come with labels, such as fresh fruits and vegetables, you'll need a guide that lists carbohydrate in these foods. Ask your doctor, dietitian, or diabetes educator about books or other nutrition guides you can use. If you take insulin, you need to know how many grams of carbohydrate are in a meal. This lets you know how much rapid-acting insulin to take before you eat. If you use an insulin pump, you get a constant rate of insulin during the day. So the pump must be programmed at meals to give you extra insulin to cover the rise in blood sugar after meals. When you know how much carbohydrate you will eat, you can take the right amount of insulin. Or, if you always use the same amount of insulin, you need to make sure that you eat the same amount of carbohydrate at meals. If you need more help to understand carbohydrate counting and food labels, ask your doctor, dietitian, or diabetes educator. How do you get started with meal planning? Here are some tips to get started: · Plan your meals a week at a time. Don't forget to include snacks too. · Use cookbooks or online recipes to plan several main meals. Plan some quick meals for busy nights. You also can double some recipes that freeze well. Then you can save half for other busy nights when you don't have time to cook. · Make sure you have the ingredients you need for your recipes. If you're running low on basic items, put these items on your shopping list too. · List foods that you use to make breakfasts, lunches, and snacks. List plenty of fruits and vegetables. · Post this list on the refrigerator. Add to it as you think of more things you need. · Take the list to the store to do your weekly shopping. Follow-up care is a key part of your treatment and safety. Be sure to make and go to all appointments, and call your doctor if you are having problems. It's also a good idea to know your test results and keep a list of the medicines you take. Where can you learn more? Go to http://marie-angi.info/. Radha Lion in the search box to learn more about \"Learning About Meal Planning for Diabetes. \" Current as of: April 16, 2019 Content Version: 12.2 © 1897-8218 [employer]. Care instructions adapted under license by [employer] (which disclaims liability or warranty for this information). If you have questions about a medical condition or this instruction, always ask your healthcare professional. [place] [address] any warranty or liability for your use of this information.

## 2020-01-28 LAB
ALBUMIN SERPL-MCNC: 4.1 G/DL (ref 4–5)
ALBUMIN/GLOB SERPL: 1.2 {RATIO} (ref 1.2–2.2)
ALP SERPL-CCNC: 80 IU/L (ref 39–117)
ALT SERPL-CCNC: 19 IU/L (ref 0–44)
AST SERPL-CCNC: 10 IU/L (ref 0–40)
BILIRUB SERPL-MCNC: <0.2 MG/DL (ref 0–1.2)
BUN SERPL-MCNC: 13 MG/DL (ref 6–24)
BUN/CREAT SERPL: 14 (ref 9–20)
CALCIUM SERPL-MCNC: 9.6 MG/DL (ref 8.7–10.2)
CHLORIDE SERPL-SCNC: 99 MMOL/L (ref 96–106)
CHOLEST SERPL-MCNC: 178 MG/DL (ref 100–199)
CO2 SERPL-SCNC: 26 MMOL/L (ref 20–29)
CREAT SERPL-MCNC: 0.96 MG/DL (ref 0.76–1.27)
ERYTHROCYTE [DISTWIDTH] IN BLOOD BY AUTOMATED COUNT: 14.6 % (ref 11.6–15.4)
EST. AVERAGE GLUCOSE BLD GHB EST-MCNC: 143 MG/DL
GLOBULIN SER CALC-MCNC: 3.3 G/DL (ref 1.5–4.5)
GLUCOSE SERPL-MCNC: 128 MG/DL (ref 65–99)
HBA1C MFR BLD: 6.6 % (ref 4.8–5.6)
HCT VFR BLD AUTO: 39.1 % (ref 37.5–51)
HDLC SERPL-MCNC: 41 MG/DL
HGB BLD-MCNC: 12.9 G/DL (ref 13–17.7)
MCH RBC QN AUTO: 30.3 PG (ref 26.6–33)
MCHC RBC AUTO-ENTMCNC: 33 G/DL (ref 31.5–35.7)
MCV RBC AUTO: 92 FL (ref 79–97)
PLATELET # BLD AUTO: 162 X10E3/UL (ref 150–450)
POTASSIUM SERPL-SCNC: 4 MMOL/L (ref 3.5–5.2)
PROT SERPL-MCNC: 7.4 G/DL (ref 6–8.5)
RBC # BLD AUTO: 4.26 X10E6/UL (ref 4.14–5.8)
SODIUM SERPL-SCNC: 143 MMOL/L (ref 134–144)
TSH SERPL DL<=0.005 MIU/L-ACNC: 3.2 UIU/ML (ref 0.45–4.5)
WBC # BLD AUTO: 5.1 X10E3/UL (ref 3.4–10.8)

## 2020-02-25 ENCOUNTER — OFFICE VISIT (OUTPATIENT)
Dept: FAMILY MEDICINE CLINIC | Age: 45
End: 2020-02-25

## 2020-02-25 VITALS
WEIGHT: 147.4 LBS | RESPIRATION RATE: 20 BRPM | HEIGHT: 64 IN | BODY MASS INDEX: 25.16 KG/M2 | SYSTOLIC BLOOD PRESSURE: 206 MMHG | DIASTOLIC BLOOD PRESSURE: 148 MMHG | HEART RATE: 75 BPM | TEMPERATURE: 96.8 F

## 2020-02-25 DIAGNOSIS — Z00.00 MEDICARE ANNUAL WELLNESS VISIT, SUBSEQUENT: Primary | ICD-10-CM

## 2020-02-25 DIAGNOSIS — Z13.39 SCREENING FOR ALCOHOLISM: ICD-10-CM

## 2020-02-25 NOTE — PATIENT INSTRUCTIONS
Medicare Wellness Visit, Male The best way to live healthy is to have a lifestyle where you eat a well-balanced diet, exercise regularly, limit alcohol use, and quit all forms of tobacco/nicotine, if applicable. Regular preventive services are another way to keep healthy. Preventive services (vaccines, screening tests, monitoring & exams) can help personalize your care plan, which helps you manage your own care. Screening tests can find health problems at the earliest stages, when they are easiest to treat. [facility] follows the current, evidence-based guidelines published by the [facility] ([facility]) when recommending preventive services for our patients. Because we follow these guidelines, sometimes recommendations change over time as research supports it. (For example, a prostate screening blood test is no longer routinely recommended for men with no symptoms). Of course, you and your doctor may decide to screen more often for some diseases, based on your risk and co-morbidities (chronic disease you are already diagnosed with). Preventive services for you include: - Medicare offers their members a free annual wellness visit, which is time for you and your primary care provider to discuss and plan for your preventive service needs. Take advantage of this benefit every year! 
-All adults over age 72 should receive the recommended pneumonia vaccines. Current USPSTF guidelines recommend a series of two vaccines for the best pneumonia protection.  
-All adults should have a flu vaccine yearly and tetanus vaccine every 10 years. 
-All adults age 48 and older should receive the shingles vaccines (series of two vaccines).       
-All adults age 38-68 who are overweight should have a diabetes screening test once every three years.  
-Other screening tests & preventive services for persons with diabetes include: an eye exam to screen for diabetic retinopathy, a kidney function test, a foot exam, and stricter control over your cholesterol.  
-Cardiovascular screening for adults with routine risk involves an electrocardiogram (ECG) at intervals determined by the provider.  
-Colorectal cancer screening should be done for adults age 54-65 with no increased risk factors for colorectal cancer. There are a number of acceptable methods of screening for this type of cancer. Each test has its own benefits and drawbacks. Discuss with your provider what is most appropriate for you during your annual wellness visit. The different tests include: colonoscopy (considered the best screening method), a fecal occult blood test, a fecal DNA test, and sigmoidoscopy. 
-All adults born between [facility] should be screened once for Hepatitis C. 
-An Abdominal Aortic Aneurysm (AAA) Screening is recommended for men age 73-68 who has ever smoked in their lifetime. Here is a list of your current Health Maintenance items (your personalized list of preventive services) with a due date: 
Health Maintenance Due Topic Date Due  
 Diabetic Foot Care  03/23/2017 [facility] Eye Exam  07/09/2017  Albumin Urine Test  07/25/2018 Ruiz Feliciano Annual Well Visit  02/14/2020 Tuberculin Skin Test: Care Instructions Your Care Instructions Tuberculosis (TB) is a bacterial infection that can damage the lungs or other parts of the body. The TB skin test can tell if you have TB bacteria in your body. Many people are exposed to TB and test positive for TB bacteria in their bodies, but they don't get the disease. TB bacteria can stay in your body without making you sick. This is because your immune system can keep TB in check. Your doctor may want you to have a TB skin test if you have been in close contact with someone who has TB.  Or you may need the test if you have symptoms that might be caused by TB, such as a cough that does not go away, a fever, or weight loss. You also may get the test if you are a health care worker. During the skin test, part of a TB bacterium is injected under your skin. The test will feel like a skin prick. If you have TB bacteria in your body, a firm red bump will form at the shot site within 2 days. If the test shows that you are infected with TB (positive), your doctor probably will order more tests. A TB-positive skin test can't tell when you became infected with TB. And it can't tell whether the infection can be passed to others. Follow-up care is a key part of your treatment and safety. Be sure to make and go to all appointments, and call your doctor if you are having problems. It's also a good idea to know your test results and keep a list of the medicines you take. How can you care for yourself at home? · Do not scratch the test site. Scratching it may cause redness or swelling. This could affect the test results. · To ease itching, put a cold washcloth on the site. Then pat the site dry. · Do not cover the test site with a bandage or other dressing. · Go back to your doctor's office or hospital to have the test read on the follow-up date. This must be done between 48 and 72 hours after you get the shot. When should you call for help? Watch closely for changes in your health, and be sure to contact your doctor if you have any problems. Where can you learn more? Go to http://marie-angi.info/. Enter ([phone] in the search box to learn more about \"Tuberculin Skin Test: Care Instructions. \" Current as of: June 9, 2019 Content Version: 12.2 © 9958-2662 [employer]. Care instructions adapted under license by [employer] (which disclaims liability or warranty for this information).  If you have questions about a medical condition or this instruction, always ask your healthcare professional. [employer] disclaims any warranty or liability for your use of this information.

## 2020-02-25 NOTE — PROGRESS NOTES
This is the Subsequent Medicare Annual Wellness Exam, performed 12 months or more after the Initial AWV or the last Subsequent AWV    I have reviewed the patient's medical history in detail and updated the computerized patient record. History     Present for CPE, last Complete Physical exam was yrly in 2019 , He is Up todate w/ all vaccination, he has MR presented with care provider, no family member present with him at this time, has a couple of forms to be refilled, no sz activity for >2yrs,   Last psa exam was never,   last colonoscopy was never,  No past surgical hx,  last bone dexa scan was never,   unaware family hx of prostate cancer   Unaware family hx of colon cancer, parent unknown, not sexaully active , not physically active,  compliant w/ meds, +Rf needed for today for his meds.      Ros is as per the assisted living      DMtype II  Patient also present for diabetic check,  the patient has been compliancy w/  a diabetic diet, and eat less of carbohydrates, since last visit patient has been more active with daily walking,   This time patient denies  tingling sensation, has no polyurea and polydipsia, last a1c was at target of 6.6% %           Patient Active Problem List   Diagnosis Code    HTN (hypertension) I10    Environmental allergies Z91.09    Hypokalemia E87.6    Elevated BUN R79.9    Dyslipidemia E78.5    Profound mental retardation in adult F73    Torsion, testicular N44.00    HTN, goal below 130/80 I10    PPD screening test Z11.1    Otitis media H66.90    Decrease in appetite R63.0    Fever and chills R50.9    Bronchitis, acute J20.9    Platelets decreased ([address] D69.6    Injury of elbow, right S59.901A    Fever in adult R50.9    Acute pharyngitis J02.9    Prediabetes R73.03    Dysphagia R13.10    Cellulitis of groin L03.314    Fall at nursing home [address]    Seizure ([address] R56.9    Inguinal bulge R19.09    Chronic rhinitis J31.0    Agitation requiring sedation protocol R45.1    Stool incontinence R15.9    Functional diarrhea K59.1    Cellulitis L03.90     Past Medical History:   Diagnosis Date    Acute pharyngitis 9/29/2014    Agitation requiring sedation protocol 4/11/2018    Bronchitis, acute 12/12/2013    Cellulitis 7/26/2018    Cellulitis of groin 3/23/2016    Chronic rhinitis 4/11/2018    Contact dermatitis and other eczema, due to unspecified cause     Decrease in appetite 12/12/2013    Diabetes [facility]     Dysphagia 10/22/2014    Fall at nursing home 7/24/2017    Fever in adult 9/29/2014    Hypertension     Inguinal bulge 4/11/2018    Mental retardation     Prediabetes 10/2/2014    Screening for glaucoma 12/3/2014    Seizure ([facility] 11/27/2017    Stool incontinence 4/11/2018      Past Surgical History:   Procedure Laterality Date    HX HEENT      dental surg. June 7, 2010     Current Outpatient Medications   Medication Sig Dispense Refill    albuterol (PROVENTIL HFA, VENTOLIN HFA, PROAIR HFA) 90 mcg/actuation inhaler Take 1 Puff by inhalation every four (4) hours as needed for Wheezing. 1 Inhaler 1    docusate sodium (COLACE) 100 mg capsule TAKE (1) CAPSULE BY MOUTH TWICE DAILY 60 Cap PRN    montelukast (SINGULAIR) 10 mg tablet TAKE 1 TABLET BY MOUTH DAILY ** OK TO CRUSH AND GIVE MEDS** 30 Tab PRN    furosemide (LASIX) 20 mg tablet One tab daily 30 Tab 0    magnesium hydroxide (GARIBAY MILK OF MAGNESIA) 400 mg/5 mL suspension Take 30 mL by mouth nightly as needed for Constipation. Give ONLY if patient has  no bowel movement in three days. 354 mL 1    polyethylene glycol (MIRALAX) 17 gram packet Take 1 Packet by mouth daily.  Hold for loose stool 30 Each 12    lisinopril (PRINIVIL, ZESTRIL) 40 mg tablet Take 1 tab daily, Hold meds if systolic AU<21, diastolic AO<02,& if PE<70UKR,FMOR MD if systolic VG>192,JT diastolic PQ>87 & if IQ>554 bpm 90 Tab 1    cloNIDine HCl (CATAPRES) 0.2 mg tablet TAKE ONE TABLET BY MOUTH AT BEDTIME  OK TO CRUSH AND GIVE MEDS    Hold bp meds if systolic bp <77,  if diastolic bp <08, and if HR <60bpm, call MD if systolic BP > 431,  if diastolic bp >41 and if HR >100 bpm  Indications: Attention Deficit Disorder with Hyperactivity, high blood pressure 90 Tab 2    nebivolol (BYSTOLIC) 10 mg tablet TAKE 1 TABLET BY MOUTH TWICE A DAY  OK TO CRUSH AND GIVE MEDS,  Hold bp meds if systolic bp <11,  if diastolic bp <28, and if HR <60bpm, call MD if systolic BP > 915,  if diastolic bp >16 and if HR >100 bpm 60 Tab 6    potassium chloride SR (KLOR-CON 10) 10 mEq tablet TAKE 1 TABLET BY MOUTH DAILY DO NOT HU MUST SWALLOW WHOLE 28 Tab PRN    cetirizine (ZYRTEC) 10 mg tablet TAKE 1 TABLET BY MOUTH DAILY FOR ALLERGIES ** OK TO CRUSH AND GIVE MEDS** 30 Tab PRN    omeprazole (PRILOSEC) 40 mg capsule TAKE 1 CAPSULE BY MOUTH EACH MORNING 30 MINUTES BEFORE BREAKFAST **OKTO OPEN CAPSULE AND GIVE CONTENTS** 30 Cap PRN    acetaminophen (TYLENOL) 325 mg tablet Take 2 tablets by mouth every 4 hours as needed for needed      fluticasone (FLONASE) 50 mcg/actuation nasal spray BLOW NOSE: 2 SPRAYS IN EACH NOSTRIL DAILY FOR ALLERGY. 16 g 11    ciclopirox (PENLAC) 8 % solution APPLY TO AFFECTED AREA TWICE DAILY EXTERNALLY AS DIRECTED. (Patient taking differently: Apply on toenails at bedtime and wipe off with polish removal in the morning) 6.6 mL 11    albuterol (PROVENTIL HFA, VENTOLIN HFA, PROAIR HFA) 90 mcg/actuation inhaler Take 1 puff by inhalation every four (4) hours as needed for Wheezing. 1 Inhaler 1    azithromycin (ZITHROMAX) 250 mg tablet 2 first day then one tab daily till finished 6 Tab 0    OTHER Pardeep's baby shampoo. use as lid scrub to each eye once daily      Miscellaneous Medical Supply (OCUSOFT EYELID CLEANSING PADS) pads by Does Not Apply route.  alcaftadine (LASTACAFT) 0.25 % drop Apply  to eye.        Allergies   Allergen Reactions    Zofran [Ondansetron Hcl] Itching     Arm turned red & itched    No Known Allergies Hives       Family History   Family history unknown: Yes     Social History     Tobacco Use    Smoking status: Never Smoker    Smokeless tobacco: Never Used   Substance Use Topics    Alcohol use: No       Depression Risk Factor Screening:     3 most recent PHQ Screens 2/25/2020   Little interest or pleasure in doing things Not at all   Feeling down, depressed, irritable, or hopeless Not at all   Total Score PHQ 2 0       Alcohol Risk Factor Screening (MALE < 65): Do you average more than 2 drinks per night or 14 drinks a week: No    On any one occasion in the past three months have you have had more than 4 drinks containing alcohol:  No      Functional Ability and Level of Safety:   Hearing: Hearing is good. Activities of Daily Living: The home contains: handrails, grab bars and rugs  Patient needs help with:  phone, transportation, shopping, preparing meals, laundry, housework, managing medications, managing money, eating, dressing, bathing and hygiene    Ambulation: wheelchair bound    Fall Risk:  Fall Risk Assessment, last 12 mths 7/25/2019   Able to walk? No       Abuse Screen:  Patient is not abused    Cognitive Screening   Has your family/caregiver stated any concerns about your memory: yes - with MR since birth  Cognitive Screening: Abnormal - unable to asses    Patient Care Team   Patient Care Team:  Rose Prasad MD as PCP - General (Family Practice)  Rose Prasad MD as PCP - [facility] Provider  Natalie Bernardo RN as Nurse Navigator  Nav Benton MD as Physician (Cardiology)    Assessment/Plan   Education and counseling provided:  Are appropriate based on today's review and evaluation  Pneumococcal Vaccine  Influenza Vaccine    Diagnoses and all orders for this visit:    1.  Medicare annual wellness visit, subsequent  -     AMB POC TUBERCULOSIS, INTRADERMAL (SKIN TEST)    2. Screening for alcoholism  -     CA ANNUAL ALCOHOL SCREEN 15 MIN        [facility] education and counseling provided:  Age appropriate evidence-based preventive care recommendations based on today's review and evaluation; including relevant cancer screening guidelines, and vaccination recommendations. An After Visit Summary was printed and given to the patient with information about these guidelines, and a personalized schedule for health maintenance items. When appropriate and with patient agreement, orders noted below were placed to complete missing health maintenance items.       Health Maintenance Due   Topic Date Due    Foot Exam Q1  03/23/2017    Eye Exam Retinal or Dilated  07/09/2017    MICROALBUMIN Q1  07/25/2018    Medicare Yearly Exam  02/14/2020

## 2020-02-25 NOTE — PROGRESS NOTES
PPD Placement note  Duana Hodgkin, 53 y.o. male is here today for placement of PPD test  Reason for PPD test:  Day support  Pt taken PPD test before: yes  Verified in allergy area and with patient that they are not allergic to the products PPD is made of (Phenol or Tween). Yes  Is patient taking any oral or IV steroid medication now or have they taken it in the last month? no  Has the patient ever received the BCG vaccine?: no  Has the patient been in recent contact with anyone known or suspected of having active TB disease?: no       Date of exposure (if applicable): n/a       Name of person they were exposed to (if applicable): n/a  Patient's Country of origin?: [place]  O: Alert and oriented in NAD. P:  PPD placed on 2/25/2020. Patient advised to return for reading within 48-72 hours. After obtaining consent, and per orders of , tuberculin 0.1mg  given to  Left forearm ID . Patient instructed to remain in clinic for 15 minutes afterwards, and to report any adverse reaction to me immediately.  Patient did not have any adverse reactions during this office visit

## 2020-02-25 NOTE — PROGRESS NOTES
Name and  verified/caregiver. Chief Complaint   Patient presents with    Well Male         Health Maintenance reviewed-discussed with patient. 1. Have you been to the ER, urgent care clinic since your last visit? Hospitalized since your last visit? no    2. Have you seen or consulted any other health care providers outside of the [address] [place] [address] since your last visit? Include any pap smears or colon screening. no      Caregiver stated home blood pressure reading are in normal range.

## 2020-02-27 LAB
MM INDURATION POC: 0 MM (ref 0–5)
PPD POC: NEGATIVE NEGATIVE

## 2020-02-27 NOTE — PROGRESS NOTES
PPD Reading Note  PPD read and results [facility] Result:0  mm induration.   Interpretation: neg  If test not read within 48-72 hours of initial placement, patient advised to repeat in other arm 1-3 weeks after this test.  Allergic reaction: no

## 2020-03-12 NOTE — PROGRESS NOTES
HISTORY OF PRESENT ILLNESS Surekha Ridley is a 37 y.o. male. HPI Brought in by caregiver Keren Simental. Has been having a strong odor to urine. No fever, chills, vomiting, anorexia, abdominal or back pains. Was recently treated by Dr. Bobby Chaudhari for UTI. ROS Physical Exam  
Constitutional: He appears well-developed and well-nourished. HENT:  
Right Ear: External ear normal.  
Left Ear: External ear normal.  
Mouth/Throat: Oropharynx is clear and moist.  
Neck: No thyromegaly present. Cardiovascular: Normal rate, regular rhythm, normal heart sounds and intact distal pulses. Pulmonary/Chest: Effort normal and breath sounds normal. No respiratory distress. He has no wheezes. Abdominal: Soft. Bowel sounds are normal. He exhibits no distension and no mass. There is no tenderness. There is no guarding. Musculoskeletal: Normal range of motion. He exhibits no edema. Lymphadenopathy:  
  He has no cervical adenopathy. Nursing note and vitals reviewed. ASSESSMENT and PLAN Orders Placed This Encounter  CULTURE, URINE  AMB POC URINALYSIS DIP STICK AUTO W/ MICRO  nitrofurantoin (MACRODANTIN) 100 mg capsule  polyethylene glycol (MIRALAX) 17 gram packet Diagnoses and all orders for this visit: 
 
1. Bad odor of urine -     AMB POC URINALYSIS DIP STICK AUTO W/ MICRO  
-     CULTURE, URINE 
 
2. Urinary tract infection without hematuria, site unspecified Other orders 
-     nitrofurantoin (MACRODANTIN) 100 mg capsule; Take 1 Cap by mouth two (2) times a day for 7 days. -     polyethylene glycol (MIRALAX) 17 gram packet; Take 1 Packet by mouth daily. Hold for loose stool Follow-up Disposition: Not on File The patient is a 70y Male complaining of foot pain/injury.

## 2020-05-21 ENCOUNTER — TELEPHONE (OUTPATIENT)
Dept: FAMILY MEDICINE CLINIC | Age: 45
End: 2020-05-21

## 2020-05-21 NOTE — TELEPHONE ENCOUNTER
----- Message from Tatyana Dawson sent at 5/21/2020  3:10 PM EDT -----  Regarding: Dr. Venegas Counts: [phone] (if not patient): Lala  Relationship of caller (if not patient): [facility] [place] contact number(s): [phone] Fax: [phone]  Name of medication and dosage if known: unknown  Is patient out of this medication (yes/no):   Pharmacy name:   Pharmacy listed in chart? (yes/no):   Pharmacy phone number:   Date of last visit:   Details to clarify the request: checking status of  certificate of medical necessities for pts incontinence supplies faxed on 05/14/20. Would like a call back or fax to confirm receipt.

## 2021-02-09 ENCOUNTER — VIRTUAL VISIT (OUTPATIENT)
Dept: FAMILY MEDICINE CLINIC | Age: 46
End: 2021-02-09
Payer: MEDICARE

## 2021-02-09 DIAGNOSIS — J20.9 ACUTE BRONCHITIS, UNSPECIFIED ORGANISM: ICD-10-CM

## 2021-02-09 DIAGNOSIS — Z71.89 ADVICE GIVEN ABOUT COVID-19 VIRUS INFECTION: ICD-10-CM

## 2021-02-09 DIAGNOSIS — U07.1 COVID-19 VIRUS INFECTION: Primary | ICD-10-CM

## 2021-02-09 PROCEDURE — G8427 DOCREV CUR MEDS BY ELIG CLIN: HCPCS | Performed by: FAMILY MEDICINE

## 2021-02-09 PROCEDURE — G8756 NO BP MEASURE DOC: HCPCS | Performed by: FAMILY MEDICINE

## 2021-02-09 PROCEDURE — 99214 OFFICE O/P EST MOD 30 MIN: CPT | Performed by: FAMILY MEDICINE

## 2021-02-09 PROCEDURE — G8432 DEP SCR NOT DOC, RNG: HCPCS | Performed by: FAMILY MEDICINE

## 2021-02-09 RX ORDER — BENZONATATE 100 MG/1
CAPSULE ORAL
COMMUNITY
End: 2022-01-05 | Stop reason: ALTCHOICE

## 2021-02-09 RX ORDER — ERGOCALCIFEROL 1.25 MG/1
50000 CAPSULE ORAL
Qty: 4 CAP | Refills: 11 | Status: SHIPPED | OUTPATIENT
Start: 2021-02-09

## 2021-02-09 RX ORDER — DOXYCYCLINE 100 MG/1
CAPSULE ORAL
COMMUNITY
End: 2021-03-04 | Stop reason: ALTCHOICE

## 2021-02-09 NOTE — PROGRESS NOTES
HISTORY OF PRESENT ILLNESS  Fouzia Kern is a 50 y.o. male. Today's Pt main concerns were provided on virtual visit and Video telemed format,  Present on VV for the concern of the current medical conditions,  Pt got it from one of the staff, sincere at a resident fluid, having 4 pepole one was sent to ER, other has been tested today with pending results, as per the nursing staff there is acute yesterday and today  is contacting the health department for further advice,     pt is w/ comorbid history and  the pt has been exposed to covid-19 individual, and has been diagnose with ++test resultsPt Have been staying at home for couple of days pt has some low grade fever with dry cough no dyspnea, no ha, not dizzy, nl smell nl taste, no N/V/D, no body ache. over all stating that things are getting better    Current patient was doxycycline and Tessalon,   Current Outpatient Medications   Medication Sig Dispense Refill    benzonatate (Tessalon Perles) 100 mg capsule Tessalon Perles 100 mg capsule   Take 1 capsule 3 times a day by oral route.  doxycycline (VIBRAMYCIN) 100 mg capsule doxycycline hyclate 100 mg capsule   Take 1 capsule twice a day by oral route for 10 days.  ergocalciferol (ERGOCALCIFEROL) 1,250 mcg (50,000 unit) capsule Take 1 Cap by mouth every seven (7) days. 4 Cap 11    vit b comp & c-fa-copper-zinc (FOLBEE PLUS CZ) 5-1.5-25 mg tab Take 1 Tab by mouth daily. 30 Tab 1    docusate sodium (COLACE) 100 mg capsule TAKE (1) CAPSULE BY MOUTH TWICE DAILY 60 Cap PRN    montelukast (SINGULAIR) 10 mg tablet TAKE 1 TABLET BY MOUTH DAILY ** OK TO CRUSH AND GIVE MEDS** 30 Tab PRN    furosemide (LASIX) 20 mg tablet One tab daily 30 Tab 0    magnesium hydroxide (GARIBAY MILK OF MAGNESIA) 400 mg/5 mL suspension Take 30 mL by mouth nightly as needed for Constipation. Give ONLY if patient has  no bowel movement in three days.  354 mL 1    polyethylene glycol (MIRALAX) 17 gram packet Take 1 Packet by mouth daily. Hold for loose stool 30 Each 12    lisinopril (PRINIVIL, ZESTRIL) 40 mg tablet Take 1 tab daily, Hold meds if systolic JJ<25,YB diastolic TO<55,& if ZU<45YFJ,RSBO MD if systolic SR>466,ZA diastolic VC>31 & if VS>628 bpm 90 Tab 1    cloNIDine HCl (CATAPRES) 0.2 mg tablet TAKE ONE TABLET BY MOUTH AT BEDTIME  OK TO CRUSH AND GIVE MEDS    Hold bp meds if systolic bp <66,  if diastolic bp <93, and if HR <60bpm, call MD if systolic BP > 036,  if diastolic bp >53 and if HR >100 bpm  Indications: Attention Deficit Disorder with Hyperactivity, high blood pressure 90 Tab 2    nebivolol (BYSTOLIC) 10 mg tablet TAKE 1 TABLET BY MOUTH TWICE A DAY  OK TO CRUSH AND GIVE MEDS,  Hold bp meds if systolic bp <75,  if diastolic bp <79, and if HR <60bpm, call MD if systolic BP > 599,  if diastolic bp >09 and if HR >100 bpm 60 Tab 6    potassium chloride SR (KLOR-CON 10) 10 mEq tablet TAKE 1 TABLET BY MOUTH DAILY DO NOT HU MUST SWALLOW WHOLE 28 Tab PRN    cetirizine (ZYRTEC) 10 mg tablet TAKE 1 TABLET BY MOUTH DAILY FOR ALLERGIES ** OK TO CRUSH AND GIVE MEDS** 30 Tab PRN    omeprazole (PRILOSEC) 40 mg capsule TAKE 1 CAPSULE BY MOUTH EACH MORNING 30 MINUTES BEFORE BREAKFAST **OKTO OPEN CAPSULE AND GIVE CONTENTS** 30 Cap PRN    acetaminophen (TYLENOL) 325 mg tablet Take 2 tablets by mouth every 4 hours as needed for needed      fluticasone (FLONASE) 50 mcg/actuation nasal spray BLOW NOSE: 2 SPRAYS IN EACH NOSTRIL DAILY FOR ALLERGY. 16 g 11    OTHER Pardeep's baby shampoo. use as lid scrub to each eye once daily      ciclopirox (PENLAC) 8 % solution APPLY TO AFFECTED AREA TWICE DAILY EXTERNALLY AS DIRECTED. (Patient taking differently: Apply on toenails at bedtime and wipe off with polish removal in the morning) 6.6 mL 11    Miscellaneous Medical Supply (OCUSOFT EYELID CLEANSING PADS) pads by Does Not Apply route.  alcaftadine (LASTACAFT) 0.25 % drop Apply  to eye.       albuterol (PROVENTIL HFA, VENTOLIN HFA, PROAIR HFA) 90 mcg/actuation inhaler Take 1 puff by inhalation every four (4) hours as needed for Wheezing. 1 Inhaler 1     Allergies   Allergen Reactions    Zofran [Ondansetron Hcl] Itching     Arm turned red & itched    No Known Allergies Hives     Past Medical History:   Diagnosis Date    Acute pharyngitis 9/29/2014    Agitation requiring sedation protocol 4/11/2018    Bronchitis, acute 12/12/2013    Cellulitis 7/26/2018    Cellulitis of groin 3/23/2016    Chronic rhinitis 4/11/2018    Contact dermatitis and other eczema, due to unspecified cause     Decrease in appetite 12/12/2013    [facility] ([facility]     Dysphagia 10/22/2014    Fall at nursing home 7/24/2017    Fever in adult 9/29/2014    Hypertension     Inguinal bulge 4/11/2018    Mental retardation     Prediabetes 10/2/2014    Screening for glaucoma 12/3/2014    Seizure ([facility] 11/27/2017    Stool incontinence 4/11/2018     Past Surgical History:   Procedure Laterality Date    HX HEENT      dental surg. June 7, 2010     Family History   Family history unknown: Yes     Social History     Tobacco Use    Smoking status: Never Smoker    Smokeless tobacco: Never Used   Substance Use Topics    Alcohol use: No      Lab Results   Component Value Date/Time    WBC 5.1 01/27/2020 03:48 PM    HGB 12.9 (L) 01/27/2020 03:48 PM    HCT 39.1 01/27/2020 03:48 PM    PLATELET 643 57/49/8203 03:48 PM    MCV 92 01/27/2020 03:48 PM     Lab Results   Component Value Date/Time    TSH 3.200 01/27/2020 03:48 PM         Review of Systems   Unable to perform ROS: Patient nonverbal       Physical Exam  Constitutional:       Appearance: Normal appearance. HENT:      Head: Normocephalic and atraumatic. Nose: Nose normal. No congestion. Neurological:      Mental Status: He is alert and oriented to person, place, and time. Psychiatric:         Mood and Affect: Mood normal.         Behavior: Behavior normal.         Thought Content:  Thought content normal.         Judgment: Judgment normal.         ASSESSMENT and PLAN  Diagnoses and all orders for this visit:    1. COVID-19 virus infection    2. Advice given about COVID-19 virus infection    Other orders  -     ergocalciferol (ERGOCALCIFEROL) 1,250 mcg (50,000 unit) capsule; Take 1 Cap by mouth every seven (7) days. -     vit b comp & c-fa-copper-zinc (FOLBEE PLUS CZ) 5-1.5-25 mg tab; Take 1 Tab by mouth daily.            At this time the nursing staff was told to immediately notify health department about the COVID-19 confirmed resident and the personnel any individuals that develop severe respiratory infection resolved need to be called and sent to emergency room, today's main recommendation was infection prevention and control measures ,    At this time the nursing staff was told to rapidly identify isolate and test others who may be infected, facility need to inform family member and allow no resident visits at this time, emphasized social and physical distancing hand hygiene and respiratory hygiene source control and cough etiquette all addressed today to the nursing staff and the facility, staff were also told to use a liquid cleansing disinfectant routinely clean and disinfect surfaces door handle faucets toilette handles light switches handrail handicap access doors chairs tables remote control share electronic equipment shared exercise equipment, and objects in the common area, the residents need to be isolated in the room for the duration of their illnesses meal has to be delivered regularly checked by the staff, home health dispatch on and home health agency need to be contacted on an as-needed basis with the resident condition worsens 911 need to be called immediately, appropriate PPE should be used by all personal in contact with the resident, assisted living facility were told that they can contact PCP and [facility] 24 hours a 7 days a week for any further assistance, at this time the staff were told to not relax restriction, was also told that the staff may be asxs because of their condition staff were told to do source control for all persons double facemask, visitor restriction no group activity the goal is to prevent the spread and protect the resident and personnel from severe infection hospitalization and death,

## 2021-02-09 NOTE — PROGRESS NOTES
Chief Complaint   Patient presents with    Cough     1. Have you been to the ER, urgent care clinic since your last visit? Hospitalized since your last visit? Yes When: 2/8/21 Where: [facility] Reason for visit: cough    2. Have you seen or consulted any other health care providers outside of the [address] [place] [address] since your last visit? Include any pap smears or colon screening. No    Call placed to pt. Spoke with care giver [address] [place] [address] , [facility] , Verified patient with two type of identifiers.   c/o cough, fever,  Decreased appetite, seen by [facility] on 2/7/2021  covid test positive

## 2021-02-10 RX ORDER — GLYCERIN 0.25 %
1 DROPS OPHTHALMIC (EYE)
Qty: 1 BOTTLE | Refills: 1 | Status: SHIPPED | OUTPATIENT
Start: 2021-02-10 | End: 2022-02-11 | Stop reason: ALTCHOICE

## 2021-02-17 ENCOUNTER — VIRTUAL VISIT (OUTPATIENT)
Dept: FAMILY MEDICINE CLINIC | Age: 46
End: 2021-02-17
Payer: MEDICARE

## 2021-02-17 DIAGNOSIS — U07.1 COVID-19: Primary | ICD-10-CM

## 2021-02-17 DIAGNOSIS — D69.6 PLATELETS DECREASED (HCC): ICD-10-CM

## 2021-02-17 DIAGNOSIS — J20.8 ACUTE BRONCHITIS DUE TO 2019 NOVEL CORONAVIRUS: ICD-10-CM

## 2021-02-17 DIAGNOSIS — R56.9 SEIZURE (HCC): ICD-10-CM

## 2021-02-17 DIAGNOSIS — U07.1 ACUTE BRONCHITIS DUE TO 2019 NOVEL CORONAVIRUS: ICD-10-CM

## 2021-02-17 DIAGNOSIS — E11.9 DIABETES MELLITUS TYPE 2, DIET-CONTROLLED (HCC): ICD-10-CM

## 2021-02-17 PROCEDURE — 2022F DILAT RTA XM EVC RTNOPTHY: CPT | Performed by: FAMILY MEDICINE

## 2021-02-17 PROCEDURE — G8427 DOCREV CUR MEDS BY ELIG CLIN: HCPCS | Performed by: FAMILY MEDICINE

## 2021-02-17 PROCEDURE — 99213 OFFICE O/P EST LOW 20 MIN: CPT | Performed by: FAMILY MEDICINE

## 2021-02-17 PROCEDURE — G8432 DEP SCR NOT DOC, RNG: HCPCS | Performed by: FAMILY MEDICINE

## 2021-02-17 PROCEDURE — 3046F HEMOGLOBIN A1C LEVEL >9.0%: CPT | Performed by: FAMILY MEDICINE

## 2021-02-17 PROCEDURE — G8756 NO BP MEASURE DOC: HCPCS | Performed by: FAMILY MEDICINE

## 2021-02-17 RX ORDER — PROMETHAZINE HYDROCHLORIDE AND CODEINE PHOSPHATE 6.25; 1 MG/5ML; MG/5ML
5 SOLUTION ORAL
Qty: 75 ML | Refills: 0 | Status: SHIPPED | OUTPATIENT
Start: 2021-02-17 | End: 2021-02-22

## 2021-02-17 RX ORDER — IPRATROPIUM BROMIDE AND ALBUTEROL SULFATE 2.5; .5 MG/3ML; MG/3ML
3 SOLUTION RESPIRATORY (INHALATION)
Qty: 30 NEBULE | Refills: 2 | Status: SHIPPED | OUTPATIENT
Start: 2021-02-17

## 2021-02-17 NOTE — PROGRESS NOTES
Chief Complaint   Patient presents with    Concern For BESHP-24 (Coronavirus)     1. Have you been to the ER, urgent care clinic since your last visit? Hospitalized since your last visit? No    2. Have you seen or consulted any other health care providers outside of the [address] [place] [address] since your last visit? Include any pap smears or colon screening. No    Call placed to pt. Spoke  With care giver, Deanna Thacker,  Verified patient with two type of identifiers.

## 2021-02-25 RX ORDER — FOLIC ACID/VIT BCOMP,C/CU/ZINC 5-1.5-25MG
1 TABLET ORAL DAILY
Qty: 90 TAB | Refills: 3 | Status: SHIPPED | OUTPATIENT
Start: 2021-02-25 | End: 2022-01-05 | Stop reason: ALTCHOICE

## 2021-03-04 ENCOUNTER — OFFICE VISIT (OUTPATIENT)
Dept: FAMILY MEDICINE CLINIC | Age: 46
End: 2021-03-04
Payer: MEDICARE

## 2021-03-04 VITALS
DIASTOLIC BLOOD PRESSURE: 96 MMHG | SYSTOLIC BLOOD PRESSURE: 131 MMHG | OXYGEN SATURATION: 94 % | RESPIRATION RATE: 22 BRPM | TEMPERATURE: 97.8 F | HEART RATE: 84 BPM

## 2021-03-04 DIAGNOSIS — Z23 ENCOUNTER FOR IMMUNIZATION: ICD-10-CM

## 2021-03-04 DIAGNOSIS — Z02.89 ENCOUNTER FOR COMPLETION OF FORM WITH PATIENT: ICD-10-CM

## 2021-03-04 DIAGNOSIS — Z00.00 MEDICARE ANNUAL WELLNESS VISIT, SUBSEQUENT: Primary | ICD-10-CM

## 2021-03-04 PROCEDURE — G8752 SYS BP LESS 140: HCPCS | Performed by: FAMILY MEDICINE

## 2021-03-04 PROCEDURE — G8510 SCR DEP NEG, NO PLAN REQD: HCPCS | Performed by: FAMILY MEDICINE

## 2021-03-04 PROCEDURE — G8427 DOCREV CUR MEDS BY ELIG CLIN: HCPCS | Performed by: FAMILY MEDICINE

## 2021-03-04 PROCEDURE — G8755 DIAS BP > OR = 90: HCPCS | Performed by: FAMILY MEDICINE

## 2021-03-04 PROCEDURE — G8421 BMI NOT CALCULATED: HCPCS | Performed by: FAMILY MEDICINE

## 2021-03-04 PROCEDURE — G0439 PPPS, SUBSEQ VISIT: HCPCS | Performed by: FAMILY MEDICINE

## 2021-03-04 NOTE — PATIENT INSTRUCTIONS
Medicare Wellness Visit, Male The best way to live healthy is to have a lifestyle where you eat a well-balanced diet, exercise regularly, limit alcohol use, and quit all forms of tobacco/nicotine, if applicable. Regular preventive services are another way to keep healthy. Preventive services (vaccines, screening tests, monitoring & exams) can help personalize your care plan, which helps you manage your own care. Screening tests can find health problems at the earliest stages, when they are easiest to treat. [facility] follows the current, evidence-based guidelines published by the [facility] ([facility]) when recommending preventive services for our patients. Because we follow these guidelines, sometimes recommendations change over time as research supports it. (For example, a prostate screening blood test is no longer routinely recommended for men with no symptoms). Of course, you and your doctor may decide to screen more often for some diseases, based on your risk and co-morbidities (chronic disease you are already diagnosed with). Preventive services for you include: - Medicare offers their members a free annual wellness visit, which is time for you and your primary care provider to discuss and plan for your preventive service needs. Take advantage of this benefit every year! 
-All adults over age 72 should receive the recommended pneumonia vaccines. Current USPSTF guidelines recommend a series of two vaccines for the best pneumonia protection.  
-All adults should have a flu vaccine yearly and tetanus vaccine every 10 years. 
-All adults age 48 and older should receive the shingles vaccines (series of two vaccines). -All adults age 38-68 who are overweight should have a diabetes screening test once every three years. -Other screening tests & preventive services for persons with diabetes include: an eye exam to screen for diabetic retinopathy, a kidney function test, a foot exam, and stricter control over your cholesterol.  
-Cardiovascular screening for adults with routine risk involves an electrocardiogram (ECG) at intervals determined by the provider.  
-Colorectal cancer screening should be done for adults age 54-65 with no increased risk factors for colorectal cancer. There are a number of acceptable methods of screening for this type of cancer. Each test has its own benefits and drawbacks. Discuss with your provider what is most appropriate for you during your annual wellness visit. The different tests include: colonoscopy (considered the best screening method), a fecal occult blood test, a fecal DNA test, and sigmoidoscopy. 
-All adults born between [facility] should be screened once for Hepatitis C. 
-An Abdominal Aortic Aneurysm (AAA) Screening is recommended for men age 73-68 who has ever smoked in their lifetime. Here is a list of your current Health Maintenance items (your personalized list of preventive services) with a due date: 
Health Maintenance Due Topic Date Due  
 Hepatitis C Test  Never done [facility] Diabetic Foot Care  03/23/2017 [facility] Eye Exam  07/09/2017  Albumin Urine Test  07/25/2018  Yearly Flu Vaccine (1) 09/01/2020  Hemoglobin A1C    01/27/2021  Cholesterol Test   01/27/2021 Preventing Falls: Care Instructions Your Care Instructions Getting around your home safely can be a challenge if you have injuries or health problems that make it easy for you to fall. Loose rugs and furniture in walkways are among the dangers for many older people who have problems walking or who have poor eyesight. People who have conditions such as arthritis, osteoporosis, or dementia also have to be careful not to fall. You can make your home safer with a few simple measures. Follow-up care is a key part of your treatment and safety. Be sure to make and go to all appointments, and call your doctor if you are having problems. It's also a good idea to know your test results and keep a list of the medicines you take. How can you care for yourself at home? Taking care of yourself · You may get dizzy if you do not drink enough water. To prevent dehydration, drink plenty of fluids, enough so that your urine is light yellow or clear like water. Choose water and other caffeine-free clear liquids. If you have kidney, heart, or liver disease and have to limit fluids, talk with your doctor before you increase the amount of fluids you drink. · Exercise regularly to improve your strength, muscle tone, and balance. Walk if you can. Swimming may be a good choice if you cannot walk easily. · Have your vision and hearing checked each year or any time you notice a change. If you have trouble seeing and hearing, you might not be able to avoid objects and could lose your balance. · Know the side effects of the medicines you take. Ask your doctor or pharmacist whether the medicines you take can affect your balance. Sleeping pills or sedatives can affect your balance. · Limit the amount of alcohol you drink. Alcohol can impair your balance and other senses. · Ask your doctor whether calluses or corns on your feet need to be removed. If you wear loose-fitting shoes because of calluses or corns, you can lose your balance and fall. · Talk to your doctor if you have numbness in your feet. Preventing falls at home · Remove raised doorway thresholds, throw rugs, and clutter. Repair loose carpet or raised areas in the floor. · Move furniture and electrical cords to keep them out of walking paths. · Use nonskid floor wax, and wipe up spills right away, especially on ceramic tile floors. · If you use a walker or cane, put rubber tips on it. If you use crutches, clean the bottoms of them regularly with an abrasive pad, such as steel wool. · Keep your house well lit, especially [place], and outside walkways. Use night-lights in areas such as hallways and bathrooms. Add extra light switches or use remote switches (such as switches that go on or off when you clap your hands) to make it easier to turn lights on if you have to get up during the night. · Install sturdy handrails on stairways. · Move items in your cabinets so that the things you use a lot are on the lower shelves (about waist level). · Keep a cordless phone and a flashlight with new batteries by your bed. If possible, put a phone in each of the main rooms of your house, or carry a cell phone in case you fall and cannot reach a phone. Or, you can wear a device around your neck or wrist. You push a button that sends a signal for help. · Wear low-heeled shoes that fit well and give your feet good support. Use footwear with nonskid soles. Check the heels and soles of your shoes for wear. Repair or replace worn heels or soles. · Do not wear socks without shoes on wood floors. · Walk on the grass when the sidewalks are slippery. If you live in an area that gets snow and ice in the winter, sprinkle salt on slippery steps and sidewalks. Preventing falls in the bath · Install grab bars and nonskid mats inside and outside your shower or tub and near the toilet and sinks. · Use shower chairs and bath benches. · Use a hand-held shower head that will allow you to sit while showering. · Get into a tub or shower by putting the weaker leg in first. Get out of a tub or shower with your strong side first. 
· Repair loose toilet seats and consider installing a raised toilet seat to make getting on and off the toilet easier. · Keep your bathroom door unlocked while you are in the shower. Where can you learn more? Go to http://www.gray.com/. Enter [phone] [id number] [phone] in the search box to learn more about \"Preventing Falls: Care Instructions. \" Current as of: March 16, 2018 Content Version: 11.8 © 6147-4595 [employer], [employer]. Care instructions adapted under license by [employer] (which disclaims liability or warranty for this information). If you have questions about a medical condition or this instruction, always ask your healthcare professional. [address] any warranty or liability for your use of this information.

## 2021-03-04 NOTE — LETTER
NOTIFICATION  
 
3/4/2021 9:39 AM 
 
Mr. Naveed Chong [address] [facility] [id number] To Whom It May Concern: 
 
Naveed Chong is currently under the care of [address] [place] [address] [facility]. He will be ok to receive the second Covid-19 vaccination. If there are questions or concerns please have the patient contact our office.  
 
 
 
Sincerely, 
 
 
Brea Castellanos MD

## 2021-03-04 NOTE — PROGRESS NOTES
Chief Complaint   Patient presents with    Complete Physical     1. Have you been to the ER, urgent care clinic since your last visit? Hospitalized since your last visit? Yes When: 2/28/21 [facility]  Reason for visit: covid retest     2. Have you seen or consulted any other health care providers outside of the [address] [place] [address] since your last visit? Include any pap smears or colon screening.  No     Verified patient with two type of identifiers , care giver, Josefina,  at bedside,   Requesting letter stating its ok to receive covid vaccine

## 2021-03-04 NOTE — PROGRESS NOTES
This is the Subsequent Medicare Annual Wellness Exam, performed 12 months or more after the Initial AWV or the last Subsequent AWV    I have reviewed the patient's medical history in detail and updated the computerized patient record. Present for CPE, in addition patient needs a letter stating that he is okay to get second inoculation for COVID-19 vaccination meanwhile as per the care provider and the nursing staff from assisted living patient needs few medication and hard scripts for other concerns which will be given and there would be a copy in the chart for further correspondence last Complete Physical exam was yrly in 2020, He is Up todate w/ all vaccination, he has severe MR presented with care provider lives in assisted living, currently wheelchair dependent no family member present with him at this time, has a couple of forms to be refilled, no sz activity for >2yrs,   Last psa exam was never,   last colonoscopy was never,  No past surgical hx,  last bone dexa scan was never,   unaware family hx of prostate cancer   Unaware family hx of colon cancer, parent unknown, not sexaully active , not physically active,  compliant w/ meds, +Rf needed for today for his meds.      Small pea sized skin abraisin from the injury healing well was told to watch/observe for worsening,   covid 19 + result on 2/6/2021, next yossi was at [facility] 2/23/2021 for which he was neg, house mate all neg, pt nicole huynh, nl pulse ox, no coughing, no fever      Depression Risk Factor Screening:     3 most recent PHQ Screens 3/4/2021   Little interest or pleasure in doing things Not at all   Feeling down, depressed, irritable, or hopeless Not at all   Total Score PHQ 2 0       Alcohol Risk Screen    Do you average more than 2 drinks per night or 14 drinks a week: No    On any one occasion in the past three months have you have had more than 4 drinks containing alcohol:  No        Functional Ability and Level of Safety:    Hearing: Hearing is good. Activities of Daily Living: The home contains: handrails, grab bars and rugs  Patient needs help with:  phone, transportation, shopping, preparing meals, laundry, housework, managing medications, managing money, eating, dressing, bathing, hygiene, bathroom needs and walking      Ambulation: wheelchair bound     Fall Risk:  Fall Risk Assessment, last 12 mths 3/4/2021   Able to walk? Yes   Fall in past 12 months? 0   Do you feel unsteady? 0   Are you worried about falling 0      Abuse Screen:  Patient is not abused       Cognitive Screening    Has your family/caregiver stated any concerns about your memory: no     Cognitive Screening: Abnormal - Patient with MR since birth    Assessment/Plan   Education and counseling provided:  Are appropriate based on today's review and evaluation  Influenza Vaccine  Diabetes outpatient self-management training services    Diagnoses and all orders for this visit:    1. Medicare annual wellness visit, subsequent    2. Encounter for immunization  -     FLU (FLUAD QUAD INFLUENZA VACCINE,QUAD,ADJUVANTED)  -     ADMIN INFLUENZA VIRUS VAC    3.  Encounter for completion of form with patient      Secondary to the patient chronic medical conditions,   Forms were completed, w/ with multiple questions answered to the best knowledge will keep a copy in the chart for further correspondence patient was given signed completed patient was informed about the safety and  regulations regarding this condition      Health Maintenance Due     Health Maintenance Due   Topic Date Due    Hepatitis C Screening  Never done    Foot Exam Q1  03/23/2017    Eye Exam Retinal or Dilated  07/09/2017    MICROALBUMIN Q1  07/25/2018    Flu Vaccine (1) 09/01/2020    A1C test (Diabetic or Prediabetic)  01/27/2021    Lipid Screen  01/27/2021       Patient Care Team   Patient Care Team:  Tito Skinner MD as PCP - General (Family Medicine)  Tito Skinner MD as PCP - [facility] Provider  Faisal Snow, STEPHANE as Nurse Navigator  Rd Gallegos MD as Physician (Cardiology)    History     Patient Active Problem List   Diagnosis Code    HTN (hypertension) I10    Environmental allergies Z91.09    Hypokalemia E87.6    Elevated BUN R79.9    Dyslipidemia E78.5    Profound mental retardation in adult F73    Torsion, testicular N44.00    HTN, goal below 130/80 I10    PPD screening test Z11.1    Otitis media H66.90    Decrease in appetite R63.0    Fever and chills R50.9    Bronchitis, acute J20.9    Platelets decreased ([address] D69.6    Injury of elbow, right S59.901A    Fever in adult R50.9    Acute pharyngitis J02.9    Prediabetes R73.03    Dysphagia R13.10    Cellulitis of groin L03.314    Fall at nursing home [address] [facility]    Seizure ([facility] R56.9    Inguinal bulge R19.09    Chronic rhinitis J31.0    Agitation requiring sedation protocol R45.1    Stool incontinence R15.9    Functional diarrhea K59.1    Cellulitis L03.90     Past Medical History:   Diagnosis Date    Acute pharyngitis 9/29/2014    Agitation requiring sedation protocol 4/11/2018    Bronchitis, acute 12/12/2013    Cellulitis 7/26/2018    Cellulitis of groin 3/23/2016    Chronic rhinitis 4/11/2018    Contact dermatitis and other eczema, due to unspecified cause     Decrease in appetite 12/12/2013    Diabetes ([address]     Dysphagia 10/22/2014    Fall at nursing home 7/24/2017    Fever in adult 9/29/2014    Hypertension     Inguinal bulge 4/11/2018    Mental retardation     Prediabetes 10/2/2014    Screening for glaucoma 12/3/2014    Seizure ([facility] 11/27/2017    Stool incontinence 4/11/2018      Past Surgical History:   Procedure Laterality Date    HX HEENT      dental surg. June 7, 2010     Current Outpatient Medications   Medication Sig Dispense Refill    vit b comp & c-fa-copper-zinc (Folbee Plus) 5-1.5-25 mg tab Take 1 Tab by mouth daily.  90 Tab 3    albuterol-ipratropium (DUO-NEB) 2.5 mg-0.5 mg/3 ml nebu 3 mL by Nebulization route four (4) times daily as needed for Wheezing. 30 Nebule 2    benzonatate (Tessalon Perles) 100 mg capsule Tessalon Perles 100 mg capsule   Take 1 capsule 3 times a day by oral route.  ergocalciferol (ERGOCALCIFEROL) 1,250 mcg (50,000 unit) capsule Take 1 Cap by mouth every seven (7) days. 4 Cap 11    docusate sodium (COLACE) 100 mg capsule TAKE (1) CAPSULE BY MOUTH TWICE DAILY 60 Cap PRN    montelukast (SINGULAIR) 10 mg tablet TAKE 1 TABLET BY MOUTH DAILY ** OK TO CRUSH AND GIVE MEDS** 30 Tab PRN    furosemide (LASIX) 20 mg tablet One tab daily 30 Tab 0    magnesium hydroxide (GRAIBAY MILK OF MAGNESIA) 400 mg/5 mL suspension Take 30 mL by mouth nightly as needed for Constipation. Give ONLY if patient has  no bowel movement in three days. 354 mL 1    polyethylene glycol (MIRALAX) 17 gram packet Take 1 Packet by mouth daily.  Hold for loose stool 30 Each 12    lisinopril (PRINIVIL, ZESTRIL) 40 mg tablet Take 1 tab daily, Hold meds if systolic AV<50,ZI diastolic EN<75,& if TS<90SNP,QZAC MD if systolic ZE>973,DV diastolic QU>30 & if VN>562 bpm 90 Tab 1    cloNIDine HCl (CATAPRES) 0.2 mg tablet TAKE ONE TABLET BY MOUTH AT BEDTIME  OK TO CRUSH AND GIVE MEDS    Hold bp meds if systolic bp <77,  if diastolic bp <31, and if HR <60bpm, call MD if systolic BP > 086,  if diastolic bp >94 and if HR >100 bpm  Indications: Attention Deficit Disorder with Hyperactivity, high blood pressure 90 Tab 2    nebivolol (BYSTOLIC) 10 mg tablet TAKE 1 TABLET BY MOUTH TWICE A DAY  OK TO CRUSH AND GIVE MEDS,  Hold bp meds if systolic bp <67,  if diastolic bp <78, and if HR <60bpm, call MD if systolic BP > 022,  if diastolic bp >02 and if HR >100 bpm 60 Tab 6    potassium chloride SR (KLOR-CON 10) 10 mEq tablet TAKE 1 TABLET BY MOUTH DAILY DO NOT HU MUST SWALLOW WHOLE 28 Tab PRN    cetirizine (ZYRTEC) 10 mg tablet TAKE 1 TABLET BY MOUTH DAILY FOR ALLERGIES ** OK TO CRUSH AND GIVE MEDS** 30 Tab PRN    omeprazole (PRILOSEC) 40 mg capsule TAKE 1 CAPSULE BY MOUTH EACH MORNING 30 MINUTES BEFORE BREAKFAST **OKTO OPEN CAPSULE AND GIVE CONTENTS** 30 Cap PRN    acetaminophen (TYLENOL) 325 mg tablet Take 2 tablets by mouth every 4 hours as needed for needed      fluticasone (FLONASE) 50 mcg/actuation nasal spray BLOW NOSE: 2 SPRAYS IN EACH NOSTRIL DAILY FOR ALLERGY. 16 g 11    OTHER Pardeep's baby shampoo. use as lid scrub to each eye once daily      ciclopirox (PENLAC) 8 % solution APPLY TO AFFECTED AREA TWICE DAILY EXTERNALLY AS DIRECTED. (Patient taking differently: Apply on toenails at bedtime and wipe off with polish removal in the morning) 6.6 mL 11    Miscellaneous Medical Supply (OCUSOFT EYELID CLEANSING PADS) pads by Does Not Apply route.  alcaftadine (LASTACAFT) 0.25 % drop Apply  to eye.  albuterol (PROVENTIL HFA, VENTOLIN HFA, PROAIR HFA) 90 mcg/actuation inhaler Take 1 puff by inhalation every four (4) hours as needed for Wheezing. 1 Inhaler 1    phenol-glycerin (Chloraseptic Max Sore Throat) 1.5-33 % spry 1 Actuation(s) by Mucous Membrane route four (4) times daily as needed for Cough.  1 Bottle 1     Allergies   Allergen Reactions    Zofran [Ondansetron Hcl] Itching     Arm turned red & itched    No Known Allergies Hives       Family History   Family history unknown: Yes     Social History     Tobacco Use    Smoking status: Never Smoker    Smokeless tobacco: Never Used   Substance Use Topics    Alcohol use: No   Have a copy in the chart

## 2021-04-21 ENCOUNTER — OFFICE VISIT (OUTPATIENT)
Dept: FAMILY MEDICINE CLINIC | Age: 46
End: 2021-04-21
Payer: MEDICARE

## 2021-04-21 DIAGNOSIS — R82.90 FOUL SMELLING URINE: Primary | ICD-10-CM

## 2021-04-21 LAB
BILIRUB UR QL STRIP: NEGATIVE
GLUCOSE UR-MCNC: NORMAL MG/DL
KETONES P FAST UR STRIP-MCNC: NEGATIVE MG/DL
PH UR STRIP: 5.5 [PH] (ref 4.6–8)
PROT UR QL STRIP: NORMAL
SP GR UR STRIP: 1.03 (ref 1–1.03)
UA UROBILINOGEN AMB POC: NORMAL (ref 0.2–1)
URINALYSIS CLARITY POC: CLEAR
URINALYSIS COLOR POC: YELLOW
URINE BLOOD POC: NEGATIVE
URINE LEUKOCYTES POC: NORMAL
URINE NITRITES POC: NEGATIVE

## 2021-04-21 PROCEDURE — 81003 URINALYSIS AUTO W/O SCOPE: CPT | Performed by: FAMILY MEDICINE

## 2021-04-23 ENCOUNTER — VIRTUAL VISIT (OUTPATIENT)
Dept: FAMILY MEDICINE CLINIC | Age: 46
End: 2021-04-23
Payer: MEDICARE

## 2021-04-23 DIAGNOSIS — Z71.89 ADVICE GIVEN ABOUT COVID-19 VIRUS INFECTION: ICD-10-CM

## 2021-04-23 DIAGNOSIS — E11.9 DIABETES MELLITUS TYPE 2, DIET-CONTROLLED (HCC): ICD-10-CM

## 2021-04-23 DIAGNOSIS — R80.8 OTHER PROTEINURIA: Primary | ICD-10-CM

## 2021-04-23 PROCEDURE — G8756 NO BP MEASURE DOC: HCPCS | Performed by: FAMILY MEDICINE

## 2021-04-23 PROCEDURE — 99213 OFFICE O/P EST LOW 20 MIN: CPT | Performed by: FAMILY MEDICINE

## 2021-04-23 PROCEDURE — 3046F HEMOGLOBIN A1C LEVEL >9.0%: CPT | Performed by: FAMILY MEDICINE

## 2021-04-23 PROCEDURE — G8427 DOCREV CUR MEDS BY ELIG CLIN: HCPCS | Performed by: FAMILY MEDICINE

## 2021-04-23 PROCEDURE — 2022F DILAT RTA XM EVC RTNOPTHY: CPT | Performed by: FAMILY MEDICINE

## 2021-04-23 PROCEDURE — G8432 DEP SCR NOT DOC, RNG: HCPCS | Performed by: FAMILY MEDICINE

## 2021-04-23 RX ORDER — NITROFURANTOIN (MACROCRYSTALS) 100 MG/1
100 CAPSULE ORAL
Qty: 10 CAP | Refills: 0 | Status: SHIPPED | OUTPATIENT
Start: 2021-04-23 | End: 2021-05-03

## 2021-04-23 NOTE — PROGRESS NOTES
HISTORY OF PRESENT ILLNESS  Arlene Freeman is a 50 y.o. male. Urinary Frequency   current history is provided by the patient, stating that this is a new but recurrent problem. The current episode started more than 1 week ago. The problem occurs every urination. The problem has been gradually worsening. The quality of the pain is described as burning. The pain is at a severity of 2/10. There has been no fever. is not sexually active. There is no history of pyelonephritis. Associated symptoms include frequency, hesitancy and urgency. Pertinent negatives include no chills, no sweats, no nausea, no vomiting, no discharge, no flank pain, , no abdominal pain and no back pain. has not tried acetaminophen and NSAIDs for the symptoms. The treatment provided no relief. past medical history does not include kidney stones, single kidney, urological procedure, recurrent UTIs or urinary stasis. Current Outpatient Medications   Medication Sig Dispense Refill    vit b comp & c-fa-copper-zinc (Folbee Plus) 5-1.5-25 mg tab Take 1 Tab by mouth daily. 90 Tab 3    albuterol-ipratropium (DUO-NEB) 2.5 mg-0.5 mg/3 ml nebu 3 mL by Nebulization route four (4) times daily as needed for Wheezing. 30 Nebule 2    phenol-glycerin (Chloraseptic Max Sore Throat) 1.5-33 % spry 1 Actuation(s) by Mucous Membrane route four (4) times daily as needed for Cough. 1 Bottle 1    ergocalciferol (ERGOCALCIFEROL) 1,250 mcg (50,000 unit) capsule Take 1 Cap by mouth every seven (7) days. 4 Cap 11    docusate sodium (COLACE) 100 mg capsule TAKE (1) CAPSULE BY MOUTH TWICE DAILY 60 Cap PRN    montelukast (SINGULAIR) 10 mg tablet TAKE 1 TABLET BY MOUTH DAILY ** OK TO CRUSH AND GIVE MEDS** 30 Tab PRN    furosemide (LASIX) 20 mg tablet One tab daily 30 Tab 0    magnesium hydroxide (GARIBAY MILK OF MAGNESIA) 400 mg/5 mL suspension Take 30 mL by mouth nightly as needed for Constipation. Give ONLY if patient has  no bowel movement in three days.  354 mL 1  polyethylene glycol (MIRALAX) 17 gram packet Take 1 Packet by mouth daily. Hold for loose stool 30 Each 12    lisinopril (PRINIVIL, ZESTRIL) 40 mg tablet Take 1 tab daily, Hold meds if systolic MI<13,TU diastolic OW<51,& if DV<23GXF,YKSQ MD if systolic PD>208,AH diastolic LESLIE>73 & if GB>572 bpm 90 Tab 1    cloNIDine HCl (CATAPRES) 0.2 mg tablet TAKE ONE TABLET BY MOUTH AT BEDTIME  OK TO CRUSH AND GIVE MEDS    Hold bp meds if systolic bp <13,  if diastolic bp <28, and if HR <60bpm, call MD if systolic BP > 334,  if diastolic bp >91 and if HR >100 bpm  Indications: Attention Deficit Disorder with Hyperactivity, high blood pressure 90 Tab 2    nebivolol (BYSTOLIC) 10 mg tablet TAKE 1 TABLET BY MOUTH TWICE A DAY  OK TO CRUSH AND GIVE MEDS,  Hold bp meds if systolic bp <23,  if diastolic bp <62, and if HR <60bpm, call MD if systolic BP > 894,  if diastolic bp >61 and if HR >100 bpm 60 Tab 6    potassium chloride SR (KLOR-CON 10) 10 mEq tablet TAKE 1 TABLET BY MOUTH DAILY DO NOT HU MUST SWALLOW WHOLE 28 Tab PRN    cetirizine (ZYRTEC) 10 mg tablet TAKE 1 TABLET BY MOUTH DAILY FOR ALLERGIES ** OK TO CRUSH AND GIVE MEDS** 30 Tab PRN    omeprazole (PRILOSEC) 40 mg capsule TAKE 1 CAPSULE BY MOUTH EACH MORNING 30 MINUTES BEFORE BREAKFAST **OKTO OPEN CAPSULE AND GIVE CONTENTS** 30 Cap PRN    acetaminophen (TYLENOL) 325 mg tablet Take 2 tablets by mouth every 4 hours as needed for needed      OTHER Pardeep's baby shampoo. use as lid scrub to each eye once daily      Miscellaneous Medical Supply (OCUSOFT EYELID CLEANSING PADS) pads by Does Not Apply route.  alcaftadine (LASTACAFT) 0.25 % drop Apply  to eye.  albuterol (PROVENTIL HFA, VENTOLIN HFA, PROAIR HFA) 90 mcg/actuation inhaler Take 1 puff by inhalation every four (4) hours as needed for Wheezing. 1 Inhaler 1    benzonatate (Tessalon Perles) 100 mg capsule Tessalon Perles 100 mg capsule   Take 1 capsule 3 times a day by oral route.       fluticasone (FLONASE) 50 mcg/actuation nasal spray BLOW NOSE: 2 SPRAYS IN EACH NOSTRIL DAILY FOR ALLERGY. 16 g 11    ciclopirox (PENLAC) 8 % solution APPLY TO AFFECTED AREA TWICE DAILY EXTERNALLY AS DIRECTED. (Patient taking differently: Apply on toenails at bedtime and wipe off with polish removal in the morning) 6.6 mL 11     Allergies   Allergen Reactions    Zofran [Ondansetron Hcl] Itching     Arm turned red & itched    No Known Allergies Hives     Past Medical History:   Diagnosis Date    Acute pharyngitis 9/29/2014    Agitation requiring sedation protocol 4/11/2018    Bronchitis, acute 12/12/2013    Cellulitis 7/26/2018    Cellulitis of groin 3/23/2016    Chronic rhinitis 4/11/2018    Contact dermatitis and other eczema, due to unspecified cause     Decrease in appetite 12/12/2013    Diabetes ([facility]     Dysphagia 10/22/2014    Fall at nursing home 7/24/2017    Fever in adult 9/29/2014    Hypertension     Inguinal bulge 4/11/2018    Mental retardation     Prediabetes 10/2/2014    Screening for glaucoma 12/3/2014    Seizure ([facility] [address] 11/27/2017    Stool incontinence 4/11/2018     Past Surgical History:   Procedure Laterality Date    HX HEENT      dental surg. June 7, 2010     Family History   Family history unknown: Yes     Social History     Tobacco Use    Smoking status: Never Smoker    Smokeless tobacco: Never Used   Substance Use Topics    Alcohol use: No      Lab Results   Component Value Date/Time    WBC 5.1 01/27/2020 03:48 PM    HGB 12.9 (L) 01/27/2020 03:48 PM    HCT 39.1 01/27/2020 03:48 PM    PLATELET 891 95/29/3751 03:48 PM    MCV 92 01/27/2020 03:48 PM     Lab Results   Component Value Date/Time    TSH 3.200 01/27/2020 03:48 PM         Review of Systems   Unable to perform ROS: Mental acuity       Physical Exam  HENT:      Head: Normocephalic and atraumatic. Nose: Nose normal. No congestion. Neurological:      Mental Status: He is alert. Mental status is at baseline.    Psychiatric: Behavior: Behavior normal.         ASSESSMENT and PLAN  Diagnoses and all orders for this visit:    1. Other proteinuria  -     HEMOGLOBIN A1C WITH EAG; Future  -     URINALYSIS W/ RFLX MICROSCOPIC; Future  -     nitrofurantoin (MACRODANTIN) 100 mg capsule; Take 1 Cap by mouth nightly for 10 days. 2. Advice given about COVID-19 virus infection  -     HEMOGLOBIN A1C WITH EAG; Future  -     URINALYSIS W/ RFLX MICROSCOPIC; Future  -     nitrofurantoin (MACRODANTIN) 100 mg capsule; Take 1 Cap by mouth nightly for 10 days. 3. Diabetes mellitus type 2, diet-controlled ([facility] [address]  -     HEMOGLOBIN A1C WITH EAG; Future  -     URINALYSIS W/ RFLX MICROSCOPIC; Future  -     nitrofurantoin (MACRODANTIN) 100 mg capsule; Take 1 Cap by mouth nightly for 10 days. Patient advised to have the mask on most of the time, social distance and handwashing avoid crowded area pursuant to the emergency declaration under the [address] and [place] [address] waiver authority and the [employer] Act, this Virtual Visit was conducted, with patient's consent, to reduce the patient's risk of exposure to COVID-19 and provide continuity of care for an established patient  Services were provided through a Video synchronous discussion virtually to substitute for in-person appointment.

## 2021-04-23 NOTE — PROGRESS NOTES
Chief Complaint   Patient presents with    Urinary Odor     1. Have you been to the ER, urgent care clinic since your last visit? Hospitalized since your last visit? No    2. Have you seen or consulted any other health care providers outside of the [address] [place] [address] since your last visit? Include any pap smears or colon screening. No    Call placed to pt. Verified patient with two type of identifiers. spoke with care giver, Yeimi Navas,   Stated pts urine very strong and foul x 1 week .

## 2021-04-23 NOTE — LETTER
NOTIFICATION RETURN TO WORK / SCHOOL 
 
4/23/2021 1:15 PM 
 
Mr. Deanna Barriga [address] [id number] To Whom It May Concern: 
 
Deanna Barriga is currently under the care of [address] [place] [address] OFFICE-[facility]. He will needs Below the knees Bilaterally 20-30 mmhg to be worn daily and off nightly If there are questions or concerns please have the patient contact our office.  
 
 
 
Sincerely, 
 
 
Ana Lilia Cole MD

## 2021-07-16 ENCOUNTER — OFFICE VISIT (OUTPATIENT)
Dept: FAMILY MEDICINE CLINIC | Age: 46
End: 2021-07-16
Payer: MEDICARE

## 2021-07-16 VITALS — DIASTOLIC BLOOD PRESSURE: 110 MMHG | SYSTOLIC BLOOD PRESSURE: 176 MMHG

## 2021-07-16 DIAGNOSIS — R79.9 ABNORMAL FINDING OF BLOOD CHEMISTRY, UNSPECIFIED: ICD-10-CM

## 2021-07-16 DIAGNOSIS — R79.89 OTHER SPECIFIED ABNORMAL FINDINGS OF BLOOD CHEMISTRY: ICD-10-CM

## 2021-07-16 DIAGNOSIS — R60.0 LOCALIZED EDEMA: ICD-10-CM

## 2021-07-16 DIAGNOSIS — Z02.89 ENCOUNTER FOR COMPLETION OF FORM WITH PATIENT: ICD-10-CM

## 2021-07-16 DIAGNOSIS — D69.6 PLATELETS DECREASED (HCC): ICD-10-CM

## 2021-07-16 DIAGNOSIS — L03.119 CELLULITIS OF AXILLA, UNSPECIFIED LATERALITY: ICD-10-CM

## 2021-07-16 DIAGNOSIS — R56.9 SEIZURE (HCC): Primary | ICD-10-CM

## 2021-07-16 PROCEDURE — G8753 SYS BP > OR = 140: HCPCS | Performed by: FAMILY MEDICINE

## 2021-07-16 PROCEDURE — G8432 DEP SCR NOT DOC, RNG: HCPCS | Performed by: FAMILY MEDICINE

## 2021-07-16 PROCEDURE — G8427 DOCREV CUR MEDS BY ELIG CLIN: HCPCS | Performed by: FAMILY MEDICINE

## 2021-07-16 PROCEDURE — G8755 DIAS BP > OR = 90: HCPCS | Performed by: FAMILY MEDICINE

## 2021-07-16 PROCEDURE — G8421 BMI NOT CALCULATED: HCPCS | Performed by: FAMILY MEDICINE

## 2021-07-16 PROCEDURE — 99214 OFFICE O/P EST MOD 30 MIN: CPT | Performed by: FAMILY MEDICINE

## 2021-07-16 RX ORDER — POTASSIUM CHLORIDE 750 MG/1
TABLET, FILM COATED, EXTENDED RELEASE ORAL
Qty: 28 TABLET | Refills: 2 | Status: SHIPPED | OUTPATIENT
Start: 2021-07-16 | End: 2022-02-11 | Stop reason: ALTCHOICE

## 2021-07-16 RX ORDER — FUROSEMIDE 40 MG/1
TABLET ORAL
Qty: 30 TABLET | Refills: 2 | Status: SHIPPED | OUTPATIENT
Start: 2021-07-16 | End: 2022-02-11 | Stop reason: ALTCHOICE

## 2021-07-16 NOTE — PROGRESS NOTES
HISTORY OF PRESENT ILLNESS  Mini Chen is a 48 y.o. male, pt present from a [facility]idential [facility] living, for which has few forms,   Patient currently with mobility limitation secondary to the disablity since birth, is a wheelchair dependent patient, unable to accomplish activities of daily living patient mobility limitation cannot be resolved with cane or walker patient has adequate space at home to maneuver wheelchair so that patient activity of daily living can be improved patient is unable to use manual wheelchair secondary to physical disability in addition patient has 24hrs caregiver able to provide assistance with a wheelchair at home, pt's case care provider stating that his WC currently broken,   Patient patient with history of recurrent axillary hidradenitis for which has been given multiple antibiotic therapy unfortunately not responding, secondary to his disability limitation condition not gone away and is swelled up, like a lump    Patient complains of edema. The location of the edema is lower leg(s) bilateral, ankle(s) bilateral, feet bilateral.  The edema has been moderate. Onset of symptoms was a few weeks ago, gradually worsening since that time. The edema is present all day. The patient states the problem is long-standing. on the pt last visits, the patient did not have much of the legs swelling, the weight was also better, Today the patient denies leg pain, denies rash, and legs lesion,and the denial of dizziness,   the wt went up ,     As per the nursing provider patient has not had any recent seizure    And has been in a stable state    Current Outpatient Medications   Medication Sig Dispense Refill    furosemide (LASIX) 40 mg tablet One tab daily 30 Tablet 2    potassium chloride SR (KLOR-CON 10) 10 mEq tablet TAKE 1 TABLET BY MOUTH DAILY DO NOT HU MUST SWALLOW WHOLE 28 Tablet 2    vit b comp & c-fa-copper-zinc (Folbee Plus) 5-1.5-25 mg tab Take 1 Tab by mouth daily.  90 Tab 3    albuterol-ipratropium (DUO-NEB) 2.5 mg-0.5 mg/3 ml nebu 3 mL by Nebulization route four (4) times daily as needed for Wheezing. 30 Nebule 2    phenol-glycerin (Chloraseptic Max Sore Throat) 1.5-33 % spry 1 Actuation(s) by Mucous Membrane route four (4) times daily as needed for Cough. 1 Bottle 1    ergocalciferol (ERGOCALCIFEROL) 1,250 mcg (50,000 unit) capsule Take 1 Cap by mouth every seven (7) days. 4 Cap 11    docusate sodium (COLACE) 100 mg capsule TAKE (1) CAPSULE BY MOUTH TWICE DAILY 60 Cap PRN    montelukast (SINGULAIR) 10 mg tablet TAKE 1 TABLET BY MOUTH DAILY ** OK TO CRUSH AND GIVE MEDS** 30 Tab PRN    magnesium hydroxide (GARIBAY MILK OF MAGNESIA) 400 mg/5 mL suspension Take 30 mL by mouth nightly as needed for Constipation. Give ONLY if patient has  no bowel movement in three days. 354 mL 1    polyethylene glycol (MIRALAX) 17 gram packet Take 1 Packet by mouth daily.  Hold for loose stool 30 Each 12    lisinopril (PRINIVIL, ZESTRIL) 40 mg tablet Take 1 tab daily, Hold meds if systolic NI<99,HG diastolic GZ<43,& if GS<30EDD,OWDQ MD if systolic ED>345,VZ diastolic SF>31 & if CC>162 bpm 90 Tab 1    cloNIDine HCl (CATAPRES) 0.2 mg tablet TAKE ONE TABLET BY MOUTH AT BEDTIME  OK TO CRUSH AND GIVE MEDS    Hold bp meds if systolic bp <32,  if diastolic bp <99, and if HR <60bpm, call MD if systolic BP > 512,  if diastolic bp >29 and if HR >100 bpm  Indications: Attention Deficit Disorder with Hyperactivity, high blood pressure 90 Tab 2    nebivolol (BYSTOLIC) 10 mg tablet TAKE 1 TABLET BY MOUTH TWICE A DAY  OK TO CRUSH AND GIVE MEDS,  Hold bp meds if systolic bp <57,  if diastolic bp <88, and if HR <60bpm, call MD if systolic BP > 302,  if diastolic bp >57 and if HR >100 bpm 60 Tab 6    cetirizine (ZYRTEC) 10 mg tablet TAKE 1 TABLET BY MOUTH DAILY FOR ALLERGIES ** OK TO CRUSH AND GIVE MEDS** 30 Tab PRN    omeprazole (PRILOSEC) 40 mg capsule TAKE 1 CAPSULE BY MOUTH EACH MORNING 30 MINUTES BEFORE BREAKFAST **OKTO OPEN CAPSULE AND GIVE CONTENTS** 30 Cap PRN    fluticasone (FLONASE) 50 mcg/actuation nasal spray BLOW NOSE: 2 SPRAYS IN EACH NOSTRIL DAILY FOR ALLERGY. 16 g 11    OTHER Pardeep's baby shampoo. use as lid scrub to each eye once daily      ciclopirox (PENLAC) 8 % solution APPLY TO AFFECTED AREA TWICE DAILY EXTERNALLY AS DIRECTED. (Patient taking differently: Apply on toenails at bedtime and wipe off with polish removal in the morning) 6.6 mL 11    Miscellaneous Medical Supply (OCUSOFT EYELID CLEANSING PADS) pads by Does Not Apply route.  alcaftadine (LASTACAFT) 0.25 % drop Apply  to eye.  albuterol (PROVENTIL HFA, VENTOLIN HFA, PROAIR HFA) 90 mcg/actuation inhaler Take 1 puff by inhalation every four (4) hours as needed for Wheezing. 1 Inhaler 1    benzonatate (Tessalon Perles) 100 mg capsule Tessalon Perles 100 mg capsule   Take 1 capsule 3 times a day by oral route.  (Patient not taking: Reported on 7/16/2021)      acetaminophen (TYLENOL) 325 mg tablet Take 2 tablets by mouth every 4 hours as needed for needed (Patient not taking: Reported on 7/16/2021)       Allergies   Allergen Reactions    Zofran [Ondansetron Hcl] Itching     Arm turned red & itched    No Known Allergies Hives     Past Medical History:   Diagnosis Date    Acute pharyngitis 9/29/2014    Agitation requiring sedation protocol 4/11/2018    Bronchitis, acute 12/12/2013    Cellulitis 7/26/2018    Cellulitis of groin 3/23/2016    Chronic rhinitis 4/11/2018    Contact dermatitis and other eczema, due to unspecified cause     Decrease in appetite 12/12/2013    Diabetes ([address]     Dysphagia 10/22/2014    Fall at nursing home 7/24/2017    Fever in adult 9/29/2014    Hypertension     Inguinal bulge 4/11/2018    Mental retardation     Prediabetes 10/2/2014    Screening for glaucoma 12/3/2014    Seizure ([address] 11/27/2017    Stool incontinence 4/11/2018     Past Surgical History:   Procedure Laterality Date  HX HEENT      dental surg. June 7, 2010     Family History   Family history unknown: Yes     Social History     Tobacco Use    Smoking status: Never Smoker    Smokeless tobacco: Never Used   Substance Use Topics    Alcohol use: No      Lab Results   Component Value Date/Time    WBC 5.1 01/27/2020 03:48 PM    HGB 12.9 (L) 01/27/2020 03:48 PM    HCT 39.1 01/27/2020 03:48 PM    PLATELET 025 98/22/3238 03:48 PM    MCV 92 01/27/2020 03:48 PM     Lab Results   Component Value Date/Time    TSH 3.200 01/27/2020 03:48 PM         Review of Systems   Constitutional: Negative for chills, fever and malaise/fatigue. Respiratory: Negative for cough and wheezing. Cardiovascular: Positive for leg swelling. Gastrointestinal: Negative for constipation and diarrhea. Skin: Positive for rash. Neurological: Negative for loss of consciousness. Endo/Heme/Allergies: Does not bruise/bleed easily. Psychiatric/Behavioral: Negative for depression. The patient is not nervous/anxious and does not have insomnia. All other systems reviewed and are negative. Physical Exam  Vitals and nursing note reviewed. Constitutional:       Appearance: He is well-developed. HENT:      Head: Normocephalic and atraumatic. Mouth/Throat:      Pharynx: No oropharyngeal exudate. Eyes:      Conjunctiva/sclera: Conjunctivae normal.      Pupils: Pupils are equal, round, and reactive to light. Neck:      Thyroid: No thyromegaly. Vascular: No JVD. Cardiovascular:      Rate and Rhythm: Normal rate and regular rhythm. Heart sounds: Normal heart sounds. No murmur heard. No friction rub. Pulmonary:      Effort: Pulmonary effort is normal. No respiratory distress. Breath sounds: Normal breath sounds. No wheezing or rales. Abdominal:      General: Bowel sounds are normal. There is no distension. Palpations: Abdomen is soft. Tenderness: There is no abdominal tenderness.    Musculoskeletal:         General: No tenderness. Cervical back: Normal range of motion and neck supple. Right lower leg: Edema present. Left lower leg: Edema present. Lymphadenopathy:      Cervical: No cervical adenopathy. Skin:     General: Skin is warm. Findings: No erythema or rash. Neurological:      Mental Status: He is alert and oriented to person, place, and time. Deep Tendon Reflexes: Reflexes are normal and symmetric. Psychiatric:         Behavior: Behavior normal.         ASSESSMENT and PLAN  Diagnoses and all orders for this visit:    1. Seizure ([address]  -     potassium chloride SR (KLOR-CON 10) 10 mEq tablet; TAKE 1 TABLET BY MOUTH DAILY DO NOT HU MUST SWALLOW WHOLE  -     CBC W/O DIFF; Future  -     LIPID PANEL; Future  -     HEMOGLOBIN A1C WITH EAG; Future  -     METABOLIC PANEL, COMPREHENSIVE; Future    2. Cellulitis of axilla, unspecified laterality  -     REFERRAL TO DERMATOLOGY  -     CBC W/O DIFF; Future  -     LIPID PANEL; Future  -     HEMOGLOBIN A1C WITH EAG; Future  -     METABOLIC PANEL, COMPREHENSIVE; Future    3. Platelets decreased ([facility])  -     potassium chloride SR (KLOR-CON 10) 10 mEq tablet; TAKE 1 TABLET BY MOUTH DAILY DO NOT HU MUST SWALLOW WHOLE  -     CBC W/O DIFF; Future  -     LIPID PANEL; Future  -     HEMOGLOBIN A1C WITH EAG; Future  -     METABOLIC PANEL, COMPREHENSIVE; Future    4. Localized edema  -     furosemide (LASIX) 40 mg tablet; One tab daily  -     CBC W/O DIFF; Future  -     LIPID PANEL; Future  -     HEMOGLOBIN A1C WITH EAG; Future  -     METABOLIC PANEL, COMPREHENSIVE; Future    5. Abnormal finding of blood chemistry, unspecified   -     LIPID PANEL; Future    6. Other specified abnormal findings of blood chemistry   -     HEMOGLOBIN A1C WITH EAG; Future    7.  Encounter for completion of form with patient        Secondary to the patient chronic medical conditions,   Forms completed, w/ with multiple questions answered to the best knowledge will keep a copy in the chart for further correspondence patient was given signed completed    pt was advised about not to have an  increased salt intake, avoid long flying or long car trips, used the diuretics as needed, and do the support stockings daily and off night, elevation of involved area, if any shortness of breath, high weight gain call for advise, avoid smoking/ tobacco exposure,and  sedentary life style, Leg elevation at all times or most of the times, use of two pillows at nights while in bed, ambulation as possible, pt agreed

## 2021-07-16 NOTE — PROGRESS NOTES
1. Have you been to the ER, urgent care clinic since your last visit? Hospitalized since your last visit? No    2. Have you seen or consulted any other health care providers outside of the [address] [place] [address] since your last visit? Include any pap smears or colon screening. No     Chief Complaint   Patient presents with    Leg Swelling     Bilateral leg swelling from fluid. Patient unable to provide vital signs. BP obtained.

## 2021-07-17 LAB
ALBUMIN SERPL-MCNC: 3.4 G/DL (ref 3.5–5)
ALBUMIN/GLOB SERPL: 0.8 {RATIO} (ref 1.1–2.2)
ALP SERPL-CCNC: 73 U/L (ref 45–117)
ALT SERPL-CCNC: 28 U/L (ref 12–78)
ANION GAP SERPL CALC-SCNC: 6 MMOL/L (ref 5–15)
AST SERPL-CCNC: 8 U/L (ref 15–37)
BILIRUB SERPL-MCNC: 0.2 MG/DL (ref 0.2–1)
BUN SERPL-MCNC: 20 MG/DL (ref 6–20)
BUN/CREAT SERPL: 20 (ref 12–20)
CALCIUM SERPL-MCNC: 8.9 MG/DL (ref 8.5–10.1)
CHLORIDE SERPL-SCNC: 105 MMOL/L (ref 97–108)
CHOLEST SERPL-MCNC: 200 MG/DL
CO2 SERPL-SCNC: 31 MMOL/L (ref 21–32)
CREAT SERPL-MCNC: 0.98 MG/DL (ref 0.7–1.3)
ERYTHROCYTE [DISTWIDTH] IN BLOOD BY AUTOMATED COUNT: 13.6 % (ref 11.5–14.5)
EST. AVERAGE GLUCOSE BLD GHB EST-MCNC: 189 MG/DL
GLOBULIN SER CALC-MCNC: 4.2 G/DL (ref 2–4)
GLUCOSE SERPL-MCNC: 159 MG/DL (ref 65–100)
HBA1C MFR BLD: 8.2 % (ref 4–5.6)
HCT VFR BLD AUTO: 40.9 % (ref 36.6–50.3)
HDLC SERPL-MCNC: 47 MG/DL
HDLC SERPL: 4.3 {RATIO} (ref 0–5)
HGB BLD-MCNC: 13.4 G/DL (ref 12.1–17)
LDLC SERPL CALC-MCNC: 118.8 MG/DL (ref 0–100)
MCH RBC QN AUTO: 32.1 PG (ref 26–34)
MCHC RBC AUTO-ENTMCNC: 32.8 G/DL (ref 30–36.5)
MCV RBC AUTO: 98.1 FL (ref 80–99)
NRBC # BLD: 0 K/UL (ref 0–0.01)
NRBC BLD-RTO: 0 PER 100 WBC
PLATELET # BLD AUTO: 166 K/UL (ref 150–400)
PMV BLD AUTO: 10.1 FL (ref 8.9–12.9)
POTASSIUM SERPL-SCNC: 4.1 MMOL/L (ref 3.5–5.1)
PROT SERPL-MCNC: 7.6 G/DL (ref 6.4–8.2)
RBC # BLD AUTO: 4.17 M/UL (ref 4.1–5.7)
SODIUM SERPL-SCNC: 142 MMOL/L (ref 136–145)
TRIGL SERPL-MCNC: 171 MG/DL (ref ?–150)
VLDLC SERPL CALC-MCNC: 34.2 MG/DL
WBC # BLD AUTO: 5.4 K/UL (ref 4.1–11.1)

## 2021-08-18 ENCOUNTER — OFFICE VISIT (OUTPATIENT)
Dept: FAMILY MEDICINE CLINIC | Age: 46
End: 2021-08-18
Payer: MEDICARE

## 2021-08-18 VITALS — RESPIRATION RATE: 18 BRPM

## 2021-08-18 DIAGNOSIS — Z71.89 ADVICE GIVEN ABOUT COVID-19 VIRUS INFECTION: ICD-10-CM

## 2021-08-18 DIAGNOSIS — L23.1 ALLERGIC CONTACT DERMATITIS DUE TO ADHESIVES: ICD-10-CM

## 2021-08-18 DIAGNOSIS — R36.9 ABNORMAL PENILE DISCHARGE: ICD-10-CM

## 2021-08-18 DIAGNOSIS — R53.2 COMPLETE IMMOBILITY DUE TO SEVERE PHYSICAL DISABILITY OR FRAILTY (HCC): ICD-10-CM

## 2021-08-18 DIAGNOSIS — R56.9 SEIZURE (HCC): ICD-10-CM

## 2021-08-18 DIAGNOSIS — B37.49 GENITAL CANDIDIASIS IN MALE: Primary | ICD-10-CM

## 2021-08-18 PROCEDURE — 99214 OFFICE O/P EST MOD 30 MIN: CPT | Performed by: FAMILY MEDICINE

## 2021-08-18 PROCEDURE — G8510 SCR DEP NEG, NO PLAN REQD: HCPCS | Performed by: FAMILY MEDICINE

## 2021-08-18 PROCEDURE — G8427 DOCREV CUR MEDS BY ELIG CLIN: HCPCS | Performed by: FAMILY MEDICINE

## 2021-08-18 PROCEDURE — G8421 BMI NOT CALCULATED: HCPCS | Performed by: FAMILY MEDICINE

## 2021-08-18 PROCEDURE — G8756 NO BP MEASURE DOC: HCPCS | Performed by: FAMILY MEDICINE

## 2021-08-18 RX ORDER — TRIAMCINOLONE ACETONIDE 1 MG/G
CREAM TOPICAL 2 TIMES DAILY
Qty: 15 G | Refills: 0 | Status: SHIPPED | OUTPATIENT
Start: 2021-08-18

## 2021-08-18 RX ORDER — FLUCONAZOLE 150 MG/1
150 TABLET ORAL EVERY OTHER DAY
Qty: 6 TABLET | Refills: 0 | Status: SHIPPED | OUTPATIENT
Start: 2021-08-18 | End: 2021-08-24

## 2021-08-18 RX ORDER — DOXYCYCLINE 100 MG/1
100 CAPSULE ORAL 2 TIMES DAILY
Qty: 20 CAPSULE | Refills: 0 | Status: SHIPPED | OUTPATIENT
Start: 2021-08-18 | End: 2021-08-28

## 2021-08-18 NOTE — PROGRESS NOTES
HISTORY OF PRESENT ILLNESS  Catrachita Bolden is a 48 y.o. male. penile Discharge  With the skin rash  The history is provided by the patient. This is a new problem. The current episode started more than 1 week ago. The problem occurs daily. The problem has not changed since onset. The discharge occurs spontaneously. The discharge was milky, but not cottage cheese like. he has no dyspareunia, frequency, genital burning, genital itching and finally no odor, no dysuria and no genital lesions. his past medical history does not include STD. Patient with advanced MR state with ms dysttrophy with Nursing providers for currently with sever mobility limitations secondary to the disabling MD pt is  Currently on the loaner wheelchair dependent patient, unable to accomplish activities of daily living patient mobility limitation cannot be resolved with cane or walker patient has adequate space at home to maneuver wheelchair so that patient activity of daily living can be improved patient is unable to use manual cane secondary to physical disability and muscular atrophy,  in addition patient has caregivers at home which are able to provide assistance with a wheelchair at home. Current Outpatient Medications   Medication Sig Dispense Refill    furosemide (LASIX) 40 mg tablet One tab daily 30 Tablet 2    potassium chloride SR (KLOR-CON 10) 10 mEq tablet TAKE 1 TABLET BY MOUTH DAILY DO NOT HU MUST SWALLOW WHOLE 28 Tablet 2    vit b comp & c-fa-copper-zinc (Folbee Plus) 5-1.5-25 mg tab Take 1 Tab by mouth daily. 90 Tab 3    albuterol-ipratropium (DUO-NEB) 2.5 mg-0.5 mg/3 ml nebu 3 mL by Nebulization route four (4) times daily as needed for Wheezing. 30 Nebule 2    phenol-glycerin (Chloraseptic Max Sore Throat) 1.5-33 % spry 1 Actuation(s) by Mucous Membrane route four (4) times daily as needed for Cough.  1 Bottle 1    ergocalciferol (ERGOCALCIFEROL) 1,250 mcg (50,000 unit) capsule Take 1 Cap by mouth every seven (7) days. 4 Cap 11    docusate sodium (COLACE) 100 mg capsule TAKE (1) CAPSULE BY MOUTH TWICE DAILY 60 Cap PRN    montelukast (SINGULAIR) 10 mg tablet TAKE 1 TABLET BY MOUTH DAILY ** OK TO CRUSH AND GIVE MEDS** 30 Tab PRN    magnesium hydroxide (GARIBAY MILK OF MAGNESIA) 400 mg/5 mL suspension Take 30 mL by mouth nightly as needed for Constipation. Give ONLY if patient has  no bowel movement in three days. 354 mL 1    polyethylene glycol (MIRALAX) 17 gram packet Take 1 Packet by mouth daily. Hold for loose stool 30 Each 12    lisinopril (PRINIVIL, ZESTRIL) 40 mg tablet Take 1 tab daily, Hold meds if systolic CV<45,QW diastolic ZM<18,& if MP<[id number],PFBN MD if systolic TG>302,NC diastolic SY>68 & if LL>378 bpm 90 Tab 1    cloNIDine HCl (CATAPRES) 0.2 mg tablet TAKE ONE TABLET BY MOUTH AT BEDTIME  OK TO CRUSH AND GIVE MEDS    Hold bp meds if systolic bp <61,  if diastolic bp <54, and if HR <60bpm, call MD if systolic BP > 097,  if diastolic bp >77 and if HR >100 bpm  Indications: Attention Deficit Disorder with Hyperactivity, high blood pressure 90 Tab 2    nebivolol (BYSTOLIC) 10 mg tablet TAKE 1 TABLET BY MOUTH TWICE A DAY  OK TO CRUSH AND GIVE MEDS,  Hold bp meds if systolic bp <90,  if diastolic bp <39, and if HR <60bpm, call MD if systolic BP > 049,  if diastolic bp >94 and if HR >100 bpm 60 Tab 6    cetirizine (ZYRTEC) 10 mg tablet TAKE 1 TABLET BY MOUTH DAILY FOR ALLERGIES ** OK TO CRUSH AND GIVE MEDS** 30 Tab PRN    omeprazole (PRILOSEC) 40 mg capsule TAKE 1 CAPSULE BY MOUTH EACH MORNING 30 MINUTES BEFORE BREAKFAST **OKTO OPEN CAPSULE AND GIVE CONTENTS** 30 Cap PRN    fluticasone (FLONASE) 50 mcg/actuation nasal spray BLOW NOSE: 2 SPRAYS IN EACH NOSTRIL DAILY FOR ALLERGY. 16 g 11    OTHER Pardeep's baby shampoo. use as lid scrub to each eye once daily      Miscellaneous Medical Supply (OCUSOFT EYELID CLEANSING PADS) pads by Does Not Apply route.  alcaftadine (LASTACAFT) 0.25 % drop Apply  to eye.  albuterol (PROVENTIL HFA, VENTOLIN HFA, PROAIR HFA) 90 mcg/actuation inhaler Take 1 puff by inhalation every four (4) hours as needed for Wheezing. 1 Inhaler 1    benzonatate (Tessalon Perles) 100 mg capsule Tessalon Perles 100 mg capsule   Take 1 capsule 3 times a day by oral route. (Patient not taking: Reported on 7/16/2021)      acetaminophen (TYLENOL) 325 mg tablet Take 2 tablets by mouth every 4 hours as needed for needed (Patient not taking: Reported on 7/16/2021)      ciclopirox (PENLAC) 8 % solution APPLY TO AFFECTED AREA TWICE DAILY EXTERNALLY AS DIRECTED. (Patient taking differently: Apply on toenails at bedtime and wipe off with polish removal in the morning) 6.6 mL 11     Allergies   Allergen Reactions    Zofran [Ondansetron Hcl] Itching     Arm turned red & itched    No Known Allergies Hives     Past Medical History:   Diagnosis Date    Acute pharyngitis 9/29/2014    Agitation requiring sedation protocol 4/11/2018    Bronchitis, acute 12/12/2013    Cellulitis 7/26/2018    Cellulitis of groin 3/23/2016    Chronic rhinitis 4/11/2018    Contact dermatitis and other eczema, due to unspecified cause     Decrease in appetite 12/12/2013    Diabetes ([facility] [address]     Dysphagia 10/22/2014    Fall at nursing home 7/24/2017    Fever in adult 9/29/2014    Hypertension     Inguinal bulge 4/11/2018    Mental retardation     Prediabetes 10/2/2014    Screening for glaucoma 12/3/2014    Seizure ([address] 11/27/2017    Stool incontinence 4/11/2018     Past Surgical History:   Procedure Laterality Date    HX HEENT      dental surg.  June 7, 2010     Family History   Family history unknown: Yes     Social History     Tobacco Use    Smoking status: Never Smoker    Smokeless tobacco: Never Used   Substance Use Topics    Alcohol use: No      Lab Results   Component Value Date/Time    Hemoglobin A1c 8.2 (H) 07/16/2021 05:24 PM    Hemoglobin A1c 6.6 (H) 01/27/2020 03:48 PM    Hemoglobin A1c 6.0 (H) 07/24/2017 01:13 PM    Glucose 159 (H) 07/16/2021 05:24 PM    Microalb/Creat ratio (ug/mg creat.) 7.6 07/25/2017 04:30 PM    LDL, calculated 118.8 (H) 07/16/2021 05:24 PM    Creatinine 0.98 07/16/2021 05:24 PM      Lab Results   Component Value Date/Time    Cholesterol, total 200 (H) 07/16/2021 05:24 PM    HDL Cholesterol 47 07/16/2021 05:24 PM    LDL, calculated 118.8 (H) 07/16/2021 05:24 PM    Triglyceride 171 (H) 07/16/2021 05:24 PM    CHOL/HDL Ratio 4.3 07/16/2021 05:24 PM        Review of Systems   Unable to perform ROS: Patient nonverbal       Physical Exam  HENT:      Nose: Nose normal.      Mouth/Throat:      Pharynx: Posterior oropharyngeal erythema present. Eyes:      Conjunctiva/sclera: Conjunctivae normal.      Pupils: Pupils are equal, round, and reactive to light. Cardiovascular:      Rate and Rhythm: Regular rhythm. Pulses: Normal pulses. Heart sounds: Normal heart sounds. Pulmonary:      Effort: Pulmonary effort is normal.      Breath sounds: Normal breath sounds. Abdominal:      General: There is no distension. Palpations: Abdomen is soft. Musculoskeletal:         General: Tenderness present. Cervical back: Normal range of motion and neck supple. Skin:     General: Skin is dry. Findings: Erythema, lesion and rash present. Neurological:      Mental Status: He is alert. Mental status is at baseline. ASSESSMENT and PLAN  Diagnoses and all orders for this visit:    1. Genital candidiasis in male  -     fluconazole (DIFLUCAN) 150 mg tablet; Take 1 Tablet by mouth every other day for 6 days. FDA advises cautious prescribing of oral fluconazole in pregnancy. -     doxycycline (VIBRAMYCIN) 100 mg capsule; Take 1 Capsule by mouth two (2) times a day for 10 days. -     CULTURE, GENITAL; Future    2. Abnormal penile discharge  -     fluconazole (DIFLUCAN) 150 mg tablet; Take 1 Tablet by mouth every other day for 6 days.  FDA advises cautious prescribing of oral fluconazole in pregnancy. -     doxycycline (VIBRAMYCIN) 100 mg capsule; Take 1 Capsule by mouth two (2) times a day for 10 days. -     CULTURE, GENITAL; Future    3. Allergic contact dermatitis due to adhesives  -     triamcinolone acetonide (KENALOG) 0.1 % topical cream; Apply  to affected area two (2) times a day. use thin layer    4. Seizure ([address]    5. Complete immobility due to severe physical disability or frailty ([address]    6.  Advice given about COVID-19 virus infection        Secondary to the patient chronic medical conditions,   form was completed, w/ with multiple questions answered to the best knowledge will keep a copy in the chart for further correspondence patient was given signed completed patient was informed about the safety and  regulations regarding this condition patient was also advised to follow-up with SS for further guidelines patient acknowledged understanding and agreed with today's recommendations

## 2021-08-18 NOTE — PROGRESS NOTES
Chief Complaint   Patient presents with    Penile Discharge     white     1. Have you been to the ER, urgent care clinic since your last visit? Hospitalized since your last visit? No    2. Have you seen or consulted any other health care providers outside of the [address] [place] [address] since your last visit? Include any pap smears or colon screening. No     Verified patient with two type of identifiers. caregiver present,  Stated pt has sore to top of penis, with white discharge since Monday,

## 2021-08-22 LAB
BACTERIA SPEC CULT: ABNORMAL
BACTERIA SPEC CULT: ABNORMAL
SERVICE CMNT-IMP: ABNORMAL

## 2021-09-07 ENCOUNTER — OFFICE VISIT (OUTPATIENT)
Dept: FAMILY MEDICINE CLINIC | Age: 46
End: 2021-09-07
Payer: MEDICARE

## 2021-09-07 VITALS
TEMPERATURE: 98.3 F | DIASTOLIC BLOOD PRESSURE: 98 MMHG | BODY MASS INDEX: 25.68 KG/M2 | HEIGHT: 64 IN | SYSTOLIC BLOOD PRESSURE: 147 MMHG | RESPIRATION RATE: 16 BRPM | WEIGHT: 150.4 LBS

## 2021-09-07 DIAGNOSIS — Z71.89 ADVICE GIVEN ABOUT COVID-19 VIRUS INFECTION: ICD-10-CM

## 2021-09-07 DIAGNOSIS — F51.01 PRIMARY INSOMNIA: ICD-10-CM

## 2021-09-07 DIAGNOSIS — E11.65 TYPE 2 DIABETES MELLITUS WITH HYPERGLYCEMIA, WITHOUT LONG-TERM CURRENT USE OF INSULIN (HCC): Primary | ICD-10-CM

## 2021-09-07 DIAGNOSIS — Z23 NEEDS FLU SHOT: ICD-10-CM

## 2021-09-07 DIAGNOSIS — R06.83 PRIMARY SNORING: ICD-10-CM

## 2021-09-07 PROCEDURE — G8753 SYS BP > OR = 140: HCPCS | Performed by: FAMILY MEDICINE

## 2021-09-07 PROCEDURE — 2022F DILAT RTA XM EVC RTNOPTHY: CPT | Performed by: FAMILY MEDICINE

## 2021-09-07 PROCEDURE — 90686 IIV4 VACC NO PRSV 0.5 ML IM: CPT | Performed by: FAMILY MEDICINE

## 2021-09-07 PROCEDURE — G0008 ADMIN INFLUENZA VIRUS VAC: HCPCS | Performed by: FAMILY MEDICINE

## 2021-09-07 PROCEDURE — 3052F HG A1C>EQUAL 8.0%<EQUAL 9.0%: CPT | Performed by: FAMILY MEDICINE

## 2021-09-07 PROCEDURE — G8427 DOCREV CUR MEDS BY ELIG CLIN: HCPCS | Performed by: FAMILY MEDICINE

## 2021-09-07 PROCEDURE — G8755 DIAS BP > OR = 90: HCPCS | Performed by: FAMILY MEDICINE

## 2021-09-07 PROCEDURE — G8419 CALC BMI OUT NRM PARAM NOF/U: HCPCS | Performed by: FAMILY MEDICINE

## 2021-09-07 PROCEDURE — 99214 OFFICE O/P EST MOD 30 MIN: CPT | Performed by: FAMILY MEDICINE

## 2021-09-07 PROCEDURE — G8432 DEP SCR NOT DOC, RNG: HCPCS | Performed by: FAMILY MEDICINE

## 2021-09-07 RX ORDER — METFORMIN HYDROCHLORIDE 500 MG/1
500 TABLET ORAL 2 TIMES DAILY WITH MEALS
Qty: 180 TABLET | Refills: 3 | Status: SHIPPED | OUTPATIENT
Start: 2021-09-07 | End: 2021-09-23 | Stop reason: SDUPTHER

## 2021-09-07 RX ORDER — FLASH GLUCOSE SENSOR
KIT MISCELLANEOUS
Qty: 1 KIT | Refills: 3 | Status: SHIPPED | OUTPATIENT
Start: 2021-09-07 | End: 2022-02-11 | Stop reason: ALTCHOICE

## 2021-09-07 RX ORDER — FLASH GLUCOSE SCANNING READER
EACH MISCELLANEOUS
Qty: 1 EACH | Refills: 2 | Status: SHIPPED | OUTPATIENT
Start: 2021-09-07 | End: 2022-02-11 | Stop reason: ALTCHOICE

## 2021-09-07 NOTE — PROGRESS NOTES
Chief Complaint   Patient presents with    Follow-up     6 month fu        1. Have you been to the ER, urgent care clinic since your last visit? Hospitalized since your last visit? No    2. Have you seen or consulted any other health care providers outside of the [address] [place] [address] since your last visit? Include any pap smears or colon screening.  No

## 2021-09-07 NOTE — PROGRESS NOTES
HISTORY OF PRESENT ILLNESS  Lizz Long is a 48 y.o. male. Special Requests: NO SPECIAL REQUESTS Culture result: NO NEISSERIA GONORRHOEAE ISOLATED Culture result: LIGHT YEAST, (APPARENT CANDIDA ALBICANS)   today patient present for f/u regarding the diabetic state, who has been compliancy w/ meds, who is also trying to have a less of starchy diabetic diet, and eat  diet, since last visit, patient has been more active , patient has a home monitoring glucose device  usually averaging around 130 , +++ Rf needed for today. patient  Currently denies tingling sensation, has no polyurea and polydipsia, last a1c was not at target of 8.2% %, Last urine microalbumin 2021 and was normal, patient currently on ACE inhibitor. Hemoglobin A1c 4.0 - 5.6 % 8.2  Today pt also present for Bp check, for which the pt has been a compliant w/ the bp meds, in addition pt trying to the best to have a low salt diet and to be as active as possible,   pt sometimes obtain the bp with the average of  <140/90,  At this time, pt denies  Having Chest Pain, has no legs swelling and not feeling lightheadedness, in addition, the pat has not been feeling anxious, and  Has not been stressed out,       Currently on no cpap, having had daily fatigue for many months, a lot of kicking at night,+multiple awakening, was never tested for sleep apnea, no Cigs, no tobacco chewing,   + snoring otherwise doing well  Has a nighttime snoring continues has been witnessed by the family member daily fatigue and some kicking never had any sleep study in the past feeling tired during the day    Current Outpatient Medications   Medication Sig Dispense Refill    metFORMIN (GLUCOPHAGE) 500 mg tablet Take 1 Tablet by mouth two (2) times daily (with meals).  180 Tablet 3    flash glucose scanning reader (FreeStyle Saranya 2 [place]) misc Tid prn 1 Each 2    flash glucose sensor (FreeStyle Saranya 2 Sensor) kit Tid prn 1 Kit 3    furosemide (LASIX) 40 mg tablet One tab daily 30 Tablet 2    potassium chloride SR (KLOR-CON 10) 10 mEq tablet TAKE 1 TABLET BY MOUTH DAILY DO NOT HU MUST SWALLOW WHOLE 28 Tablet 2    vit b comp & c-fa-copper-zinc (Folbee Plus) 5-1.5-25 mg tab Take 1 Tab by mouth daily. 90 Tab 3    albuterol-ipratropium (DUO-NEB) 2.5 mg-0.5 mg/3 ml nebu 3 mL by Nebulization route four (4) times daily as needed for Wheezing. 30 Nebule 2    ergocalciferol (ERGOCALCIFEROL) 1,250 mcg (50,000 unit) capsule Take 1 Cap by mouth every seven (7) days. 4 Cap 11    docusate sodium (COLACE) 100 mg capsule TAKE (1) CAPSULE BY MOUTH TWICE DAILY 60 Cap PRN    montelukast (SINGULAIR) 10 mg tablet TAKE 1 TABLET BY MOUTH DAILY ** OK TO CRUSH AND GIVE MEDS** 30 Tab PRN    magnesium hydroxide (GARIBAY MILK OF MAGNESIA) 400 mg/5 mL suspension Take 30 mL by mouth nightly as needed for Constipation. Give ONLY if patient has  no bowel movement in three days. 354 mL 1    polyethylene glycol (MIRALAX) 17 gram packet Take 1 Packet by mouth daily.  Hold for loose stool 30 Each 12    lisinopril (PRINIVIL, ZESTRIL) 40 mg tablet Take 1 tab daily, Hold meds if systolic CN<77,OJ diastolic GI<98,& if NT<22DLW,NRFD MD if systolic ZD>969,HJ diastolic BV>56 & if YO>108 bpm 90 Tab 1    cloNIDine HCl (CATAPRES) 0.2 mg tablet TAKE ONE TABLET BY MOUTH AT BEDTIME  OK TO CRUSH AND GIVE MEDS    Hold bp meds if systolic bp <98,  if diastolic bp <91, and if HR <60bpm, call MD if systolic BP > 714,  if diastolic bp >53 and if HR >100 bpm  Indications: Attention Deficit Disorder with Hyperactivity, high blood pressure 90 Tab 2    nebivolol (BYSTOLIC) 10 mg tablet TAKE 1 TABLET BY MOUTH TWICE A DAY  OK TO CRUSH AND GIVE MEDS,  Hold bp meds if systolic bp <43,  if diastolic bp <61, and if HR <60bpm, call MD if systolic BP > 782,  if diastolic bp >25 and if HR >100 bpm 60 Tab 6    cetirizine (ZYRTEC) 10 mg tablet TAKE 1 TABLET BY MOUTH DAILY FOR ALLERGIES ** OK TO CRUSH AND GIVE MEDS** 30 Tab PRN    omeprazole (PRILOSEC) 40 mg capsule TAKE 1 CAPSULE BY MOUTH EACH MORNING 30 MINUTES BEFORE BREAKFAST **OKTO OPEN CAPSULE AND GIVE CONTENTS** 30 Cap PRN    fluticasone (FLONASE) 50 mcg/actuation nasal spray BLOW NOSE: 2 SPRAYS IN EACH NOSTRIL DAILY FOR ALLERGY. 16 g 11    OTHER Pardeep's baby shampoo. use as lid scrub to each eye once daily      ciclopirox (PENLAC) 8 % solution APPLY TO AFFECTED AREA TWICE DAILY EXTERNALLY AS DIRECTED. (Patient taking differently: Apply on toenails at bedtime and wipe off with polish removal in the morning) 6.6 mL 11    Miscellaneous Medical Supply (OCUSOFT EYELID CLEANSING PADS) pads by Does Not Apply route.  albuterol (PROVENTIL HFA, VENTOLIN HFA, PROAIR HFA) 90 mcg/actuation inhaler Take 1 puff by inhalation every four (4) hours as needed for Wheezing. 1 Inhaler 1    triamcinolone acetonide (KENALOG) 0.1 % topical cream Apply  to affected area two (2) times a day. use thin layer (Patient not taking: Reported on 9/7/2021) 15 g 0    phenol-glycerin (Chloraseptic Max Sore Throat) 1.5-33 % spry 1 Actuation(s) by Mucous Membrane route four (4) times daily as needed for Cough. (Patient not taking: Reported on 9/7/2021) 1 Bottle 1    benzonatate (Tessalon Perles) 100 mg capsule Tessalon Perles 100 mg capsule   Take 1 capsule 3 times a day by oral route. (Patient not taking: Reported on 7/16/2021)      acetaminophen (TYLENOL) 325 mg tablet Take 2 tablets by mouth every 4 hours as needed for needed (Patient not taking: Reported on 7/16/2021)      alcaftadine (LASTACAFT) 0.25 % drop Apply  to eye.  (Patient not taking: Reported on 9/7/2021)       Allergies   Allergen Reactions    Zofran [Ondansetron Hcl] Itching     Arm turned red & itched    No Known Allergies Hives     Past Medical History:   Diagnosis Date    Acute pharyngitis 9/29/2014    Agitation requiring sedation protocol 4/11/2018    Bronchitis, acute 12/12/2013    Cellulitis 7/26/2018    Cellulitis of groin 3/23/2016    Chronic rhinitis 4/11/2018    Contact dermatitis and other eczema, due to unspecified cause     Decrease in appetite 12/12/2013    [facility] ([facility] [address]     Dysphagia 10/22/2014    Fall at nursing home 7/24/2017    Fever in adult 9/29/2014    Hypertension     Inguinal bulge 4/11/2018    Mental retardation     Prediabetes 10/2/2014    Screening for glaucoma 12/3/2014    Seizure ([facility] [address] 11/27/2017    Stool incontinence 4/11/2018     Past Surgical History:   Procedure Laterality Date    HX HEENT      dental surg. June 7, 2010     Family History   Family history unknown: Yes     Social History     Tobacco Use    Smoking status: Never Smoker    Smokeless tobacco: Never Used   Substance Use Topics    Alcohol use: No      Lab Results   Component Value Date/Time    Hemoglobin A1c 8.2 (H) 07/16/2021 05:24 PM    Hemoglobin A1c 6.6 (H) 01/27/2020 03:48 PM    Hemoglobin A1c 6.0 (H) 07/24/2017 01:13 PM    Glucose 159 (H) 07/16/2021 05:24 PM    Microalb/Creat ratio (ug/mg creat.) 7.6 07/25/2017 04:30 PM    LDL, calculated 118.8 (H) 07/16/2021 05:24 PM    Creatinine 0.98 07/16/2021 05:24 PM      Lab Results   Component Value Date/Time    Cholesterol, total 200 (H) 07/16/2021 05:24 PM    HDL Cholesterol 47 07/16/2021 05:24 PM    LDL, calculated 118.8 (H) 07/16/2021 05:24 PM    Triglyceride 171 (H) 07/16/2021 05:24 PM    CHOL/HDL Ratio 4.3 07/16/2021 05:24 PM        Review of Systems   Unable to perform ROS: Mental acuity       Physical Exam  Vitals and nursing note reviewed. Constitutional:       Appearance: He is well-developed. HENT:      Head: Normocephalic and atraumatic. Nose: No congestion or rhinorrhea. Mouth/Throat:      Pharynx: No oropharyngeal exudate. Eyes:      Conjunctiva/sclera: Conjunctivae normal.   Neck:      Thyroid: No thyromegaly. Vascular: No JVD. Cardiovascular:      Rate and Rhythm: Normal rate and regular rhythm. Heart sounds: Normal heart sounds. No murmur heard.    No friction rub. Pulmonary:      Effort: Pulmonary effort is normal. No respiratory distress. Breath sounds: Normal breath sounds. No wheezing or rales. Abdominal:      General: Bowel sounds are normal. There is no distension. Palpations: Abdomen is soft. Tenderness: There is no abdominal tenderness. Musculoskeletal:         General: Deformity present. No tenderness. Cervical back: Normal range of motion and neck supple. Lymphadenopathy:      Cervical: No cervical adenopathy. Skin:     General: Skin is warm. Findings: No erythema or rash. Neurological:      Mental Status: He is alert. Mental status is at baseline. Gait: Gait abnormal.      Deep Tendon Reflexes: Reflexes are normal and symmetric. Psychiatric:         Behavior: Behavior normal.         ASSESSMENT and PLAN  Diagnoses and all orders for this visit:    1. Type 2 diabetes mellitus with hyperglycemia, without long-term current use of insulin (HCC)  -     metFORMIN (GLUCOPHAGE) 500 mg tablet; Take 1 Tablet by mouth two (2) times daily (with meals). -     flash glucose scanning reader (FreeStyle Saranya 2 [place]) misc; Tid prn  -     flash glucose sensor (FreeStyle Saranya 2 Sensor) kit; Tid prn    2. Primary insomnia  -     SLEEP MEDICINE REFERRAL    3. Needs flu shot  -     INFLUENZA VIRUS VAC QUAD,SPLIT,PRESV FREE SYRINGE IM    4. Primary snoring  -     SLEEP MEDICINE REFERRAL    5.  Advice given about COVID-19 virus infection     Concern abdout COVID-19 addressed and detailed, pt was told that the best way to prevent illness is by protection with vaccination, and to Wear a facemask , having social distance, and to get tested if possible,   Pt was also told if develop dyspnea needs to call 911 or go to er, call for maykel advise, pt agreed with todays recommendations,

## 2021-09-07 NOTE — PATIENT INSTRUCTIONS
Yeast Skin Infection: Care Instructions  Your Care Instructions     Yeast normally lives on your skin. Sometimes too much yeast can overgrow in certain areas of the skin and cause an infection. The infection causes red, scaly, moist patches on your skin that may itch. Common areas for skin yeast infections are skin folds under the breasts or belly area. The warm and moist areas in the skin folds can make it easier for yeast to overgrow. Yeast infections also can be found on other parts of the body such as the groin or armpits. You will probably get a cream or ointment that contains an antifungal medicine. Examples of these are miconazole and clotrimazole. You put it on your skin to treat the infection. Your doctor may give you a prescription for the cream or ointment. Or you may be able to buy it without a prescription at most drugstores. If the infection is severe, the doctor will prescribe antifungal pills. A yeast infection usually goes away after about a week of treatment. But it's important to use the medicine for as long as your doctor tells you to. Follow-up care is a key part of your treatment and safety. Be sure to make and go to all appointments, and call your doctor if you are having problems. It's also a good idea to know your test results and keep a list of the medicines you take. How can you care for yourself at home? · Be safe with medicines. Take your medicines exactly as prescribed. Call your doctor if you think you are having a problem with your medicine. · Keep your skin clean and dry. Your doctor may suggest using powder that contains an antifungal medicine in the skin folds. · Wear loose clothing. When should you call for help? Call your doctor now or seek immediate medical care if:    · You have symptoms of infection, such as:  ? Increased pain, swelling, warmth, or redness. ? Red streaks leading from the area. ? Pus draining from the area. ? A fever.    Watch closely for changes in your health, and be sure to contact your doctor if:    · You do not get better as expected. Where can you learn more? Go to http://www.gray.com/  Enter A142 in the search box to learn more about \"Yeast Skin Infection: Care Instructions. \"  Current as of: July 2, 2020               Content Version: 12.8  © 2006-2021 [employer]. Care instructions adapted under license by [employer] (which disclaims liability or warranty for this information). If you have questions about a medical condition or this instruction, always ask your healthcare professional. [place] [address] any warranty or liability for your use of this information. Learning About Meal Planning for Diabetes  Why plan your meals? Meal planning can be a key part of managing diabetes. Planning meals and snacks with the right balance of carbohydrate, protein, and fat can help you keep your blood sugar at the target level you set with your doctor. You don't have to eat special foods. You can eat what your family eats, including sweets once in a while. But you do have to pay attention to how often you eat and how much you eat of certain foods. You may want to work with a dietitian or a certified diabetes educator. He or she can give you tips and meal ideas and can answer your questions about meal planning. This health professional can also help you reach a healthy weight if that is one of your goals. What plan is right for you? Your dietitian or diabetes educator may suggest that you start with the plate format or carbohydrate counting. The plate format  The plate format is a simple way to help you manage how you eat. You plan meals by learning how much space each food should take on a plate. Using the plate format helps you spread carbohydrate throughout the day. It can make it easier to keep your blood sugar level within your target range.  It also helps you see if you're eating healthy portion sizes. To use the plate format, you put non-starchy vegetables on half your plate. Add meat or meat substitutes on one-quarter of the plate. Put a grain or starchy vegetable (such as brown rice or a potato) on the final quarter of the plate. You can add a small piece of fruit and some low-fat or fat-free milk or yogurt, depending on your carbohydrate goal for each meal.  Here are some tips for using the plate format:  · Make sure that you are not using an oversized plate. A 9-inch plate is best. Many restaurants use larger plates. · Get used to using the plate format at home. Then you can use it when you eat out. · Write down your questions about using the plate format. Talk to your doctor, a dietitian, or a diabetes educator about your concerns. Carbohydrate counting  With carbohydrate counting, you plan meals based on the amount of carbohydrate in each food. Carbohydrate raises blood sugar higher and more quickly than any other nutrient. It is found in desserts, breads and cereals, and fruit. It's also found in starchy vegetables such as potatoes and corn, grains such as rice and pasta, and milk and yogurt. Spreading carbohydrate throughout the day helps keep your blood sugar levels within your target range. Your daily amount depends on several things, including your weight, how active you are, which diabetes medicines you take, and what your goals are for your blood sugar levels. A registered dietitian or diabetes educator can help you plan how much carbohydrate to include in each meal and snack. A guideline for your daily amount of carbohydrate is:  · 45 to 60 grams at each meal. That's about the same as 3 to 4 carbohydrate servings. · 15 to 20 grams at each snack. That's about the same as 1 carbohydrate serving. The Nutrition Facts label on packaged foods tells you how much carbohydrate is in a serving of the food. First, look at the serving size on the food label.  Is that the amount you eat in a serving? All of the nutrition information on a food label is based on that serving size. So if you eat more or less than that, you'll need to adjust the other numbers. Total carbohydrate is the next thing you need to look for on the label. If you count carbohydrate servings, one serving of carbohydrate is 15 grams. For foods that don't come with labels, such as fresh fruits and vegetables, you'll need a guide that lists carbohydrate in these foods. Ask your doctor, dietitian, or diabetes educator about books or other nutrition guides you can use. If you take insulin, you need to know how many grams of carbohydrate are in a meal. This lets you know how much rapid-acting insulin to take before you eat. If you use an insulin pump, you get a constant rate of insulin during the day. So the pump must be programmed at meals to give you extra insulin to cover the rise in blood sugar after meals. When you know how much carbohydrate you will eat, you can take the right amount of insulin. Or, if you always use the same amount of insulin, you need to make sure that you eat the same amount of carbohydrate at meals. If you need more help to understand carbohydrate counting and food labels, ask your doctor, dietitian, or diabetes educator. How can you plan healthy meals? Here are some tips to get started:  · Plan your meals a week at a time. Don't forget to include snacks too. · Use cookbooks or online recipes to plan several main meals. Plan some quick meals for busy nights. You also can double some recipes that freeze well. Then you can save half for other busy nights when you don't have time to cook. · Make sure you have the ingredients you need for your recipes. If you're running low on basic items, put these items on your shopping list too. · List foods that you use to make breakfasts, lunches, and snacks. List plenty of fruits and vegetables. · Post this list on the refrigerator.  Add to it as you think of more things you need. · Take the list to the store to do your weekly shopping. Follow-up care is a key part of your treatment and safety. Be sure to make and go to all appointments, and call your doctor if you are having problems. It's also a good idea to know your test results and keep a list of the medicines you take. Where can you learn more? Go to http://www.gray.com/  Enter Y152 in the search box to learn more about \"Learning About Meal Planning for Diabetes. \"  Current as of: August 31, 2020               Content Version: 12.8  © 7898-4451 [employer], [employer]. Care instructions adapted under license by [employer] (which disclaims liability or warranty for this information). If you have questions about a medical condition or this instruction, always ask your healthcare professional. [address] any warranty or liability for your use of this information.

## 2021-09-07 NOTE — LETTER
9/7/2021    Mr. Tima Ravi  [address] [facility]      Dear Tima Ravi:    Please find your most recent results below. Resulted Orders   METABOLIC PANEL, COMPREHENSIVE   Result Value Ref Range    Sodium 142 136 - 145 mmol/L    Potassium 4.1 3.5 - 5.1 mmol/L    Chloride 105 97 - 108 mmol/L    CO2 31 21 - 32 mmol/L    Anion gap 6 5 - 15 mmol/L    Glucose 159 (H) 65 - 100 mg/dL    BUN 20 6 - 20 MG/DL    Creatinine 0.98 0.70 - 1.30 MG/DL    BUN/Creatinine ratio 20 12 - 20      GFR est AA >60 >60 ml/min/1.73m2    GFR est non-AA >60 >60 ml/min/1.73m2    Calcium 8.9 8.5 - 10.1 MG/DL    Bilirubin, total 0.2 0.2 - 1.0 MG/DL    ALT (SGPT) 28 12 - 78 U/L    AST (SGOT) 8 (L) 15 - 37 U/L    Alk. phosphatase 73 45 - 117 U/L    Protein, total 7.6 6.4 - 8.2 g/dL    Albumin 3.4 (L) 3.5 - 5.0 g/dL    Globulin 4.2 (H) 2.0 - 4.0 g/dL    A-G Ratio 0.8 (L) 1.1 - 2.2     HEMOGLOBIN A1C WITH EAG   Result Value Ref Range    Hemoglobin A1c 8.2 (H) 4.0 - 5.6 %    Est. average glucose 189 mg/dL   LIPID PANEL   Result Value Ref Range    Cholesterol, total 200 (H) <200 MG/DL    Triglyceride 171 (H) <150 MG/DL    HDL Cholesterol 47 MG/DL    LDL, calculated 118.8 (H) 0 - 100 MG/DL    VLDL, calculated 34.2 MG/DL    CHOL/HDL Ratio 4.3 0.0 - 5.0     CBC W/O DIFF   Result Value Ref Range    WBC 5.4 4.1 - 11.1 K/uL    RBC 4.17 4.10 - 5.70 M/uL    HGB 13.4 12.1 - 17.0 g/dL    HCT 40.9 36.6 - 50.3 %    MCV 98.1 80.0 - 99.0 FL    MCH 32.1 26.0 - 34.0 PG    MCHC 32.8 30.0 - 36.5 g/dL    RDW 13.6 11.5 - 14.5 %    PLATELET 288 555 - 563 K/uL    MPV 10.1 8.9 - 12.9 FL    NRBC 0.0 0  WBC    ABSOLUTE NRBC 0.00 0.00 - 0.01 K/uL     Culture result:   LIGHT YEAST, (APPARENT CANDIDA ALBICANS)Abnormal            RECOMMENDATIONS:    1.  Normal test results except for sugar level into the UNcontrolled diabetic state please be compliant with the following steps for improving diabetic care and outcome for further care to prevent further devastating complications of a uncontrolled diabetic state: increase Diabetic Education by more readings, have a great diabetic diet , low cholesterol diet, weight control and daily exercise 20- 30 min most days of the week, home glucose monitoring if possible, and daily foot care and yearly foot  Doctor  and  annual eye examinations at Ophthalmologist,  will cont with your average sugar reading check every 3months. 2. Cont with antifungal meds and the cream till the rash is gone away    Keep up the good work! Work on diet and exercise. Continue with current  diet and medications.       Please call me if you have any questions: [phone]    Sincerely,      Princess Celeste MD

## 2021-09-08 NOTE — PROGRESS NOTES
Angy Miller is a 48 y.o. male who presents for routine immunizations. He denies any symptoms , reactions or allergies that would exclude them from being immunized today. Risks and adverse reactions were discussed and the VIS was given to them. All questions were addressed. He was observed for 15 min post injection. There were no reactions observed.     Zuly Wiley

## 2021-09-16 ENCOUNTER — TELEPHONE (OUTPATIENT)
Dept: SLEEP MEDICINE | Age: 46
End: 2021-09-16

## 2021-09-23 ENCOUNTER — VIRTUAL VISIT (OUTPATIENT)
Dept: FAMILY MEDICINE CLINIC | Age: 46
End: 2021-09-23
Payer: MEDICARE

## 2021-09-23 DIAGNOSIS — Z71.89 ADVICE GIVEN ABOUT COVID-19 VIRUS INFECTION: ICD-10-CM

## 2021-09-23 DIAGNOSIS — E11.65 TYPE 2 DIABETES MELLITUS WITH HYPERGLYCEMIA, WITHOUT LONG-TERM CURRENT USE OF INSULIN (HCC): Primary | ICD-10-CM

## 2021-09-23 PROCEDURE — G8432 DEP SCR NOT DOC, RNG: HCPCS | Performed by: FAMILY MEDICINE

## 2021-09-23 PROCEDURE — 3052F HG A1C>EQUAL 8.0%<EQUAL 9.0%: CPT | Performed by: FAMILY MEDICINE

## 2021-09-23 PROCEDURE — 99213 OFFICE O/P EST LOW 20 MIN: CPT | Performed by: FAMILY MEDICINE

## 2021-09-23 PROCEDURE — 2022F DILAT RTA XM EVC RTNOPTHY: CPT | Performed by: FAMILY MEDICINE

## 2021-09-23 PROCEDURE — G8427 DOCREV CUR MEDS BY ELIG CLIN: HCPCS | Performed by: FAMILY MEDICINE

## 2021-09-23 PROCEDURE — G8756 NO BP MEASURE DOC: HCPCS | Performed by: FAMILY MEDICINE

## 2021-09-23 RX ORDER — METFORMIN HYDROCHLORIDE 1000 MG/1
1000 TABLET ORAL 2 TIMES DAILY WITH MEALS
Qty: 60 TABLET | Refills: 5 | Status: SHIPPED | OUTPATIENT
Start: 2021-09-23 | End: 2022-05-23 | Stop reason: SDUPTHER

## 2021-09-23 NOTE — PROGRESS NOTES
HISTORY OF PRESENT ILLNESS  Bautista Cost is a 48 y.o. male. Pt's main concerns were provided on virtual visit, a telemed format, COVID-19 vaccinated pt is w/ comorbid medical history and ,  pt has no fever no cough no dyspnea, no ha, not dizzy, nl smell nl taste, no N/V/D, no body ache     today patient present for f/u regarding the diabetic state, who has been compliancy w/ meds, who is also trying to have a less of starchy diabetic diet, and eat  diet, since last visit, patient has been more active , patient has a home monitoring glucose device  usually averaging around 200, +++ Rf needed for today. patient  has no polyurea and polydipsia, last a1c was not at target of 8.2% %, Last urine microalbumin unable to obtain         Current Outpatient Medications   Medication Sig Dispense Refill    metFORMIN (GLUCOPHAGE) 1,000 mg tablet Take 1 Tablet by mouth two (2) times daily (with meals). 60 Tablet 5    flash glucose scanning reader (FreeStyle Saranya 2 Melber) misc Tid prn 1 Each 2    flash glucose sensor (FreeStyle Saranya 2 Sensor) kit Tid prn 1 Kit 3    triamcinolone acetonide (KENALOG) 0.1 % topical cream Apply  to affected area two (2) times a day. use thin layer (Patient not taking: Reported on 9/7/2021) 15 g 0    furosemide (LASIX) 40 mg tablet One tab daily 30 Tablet 2    potassium chloride SR (KLOR-CON 10) 10 mEq tablet TAKE 1 TABLET BY MOUTH DAILY DO NOT HU MUST SWALLOW WHOLE 28 Tablet 2    vit b comp & c-fa-copper-zinc (Folbee Plus) 5-1.5-25 mg tab Take 1 Tab by mouth daily. 90 Tab 3    albuterol-ipratropium (DUO-NEB) 2.5 mg-0.5 mg/3 ml nebu 3 mL by Nebulization route four (4) times daily as needed for Wheezing. 30 Nebule 2    phenol-glycerin (Chloraseptic Max Sore Throat) 1.5-33 % spry 1 Actuation(s) by Mucous Membrane route four (4) times daily as needed for Cough.  (Patient not taking: Reported on 9/7/2021) 1 Bottle 1    benzonatate (Tessalon Perles) 100 mg capsule Tessalon Perles 100 mg capsule   Take 1 capsule 3 times a day by oral route. (Patient not taking: Reported on 7/16/2021)      ergocalciferol (ERGOCALCIFEROL) 1,250 mcg (50,000 unit) capsule Take 1 Cap by mouth every seven (7) days. 4 Cap 11    docusate sodium (COLACE) 100 mg capsule TAKE (1) CAPSULE BY MOUTH TWICE DAILY 60 Cap PRN    montelukast (SINGULAIR) 10 mg tablet TAKE 1 TABLET BY MOUTH DAILY ** OK TO CRUSH AND GIVE MEDS** 30 Tab PRN    magnesium hydroxide (GARIBAY MILK OF MAGNESIA) 400 mg/5 mL suspension Take 30 mL by mouth nightly as needed for Constipation. Give ONLY if patient has  no bowel movement in three days. 354 mL 1    polyethylene glycol (MIRALAX) 17 gram packet Take 1 Packet by mouth daily.  Hold for loose stool 30 Each 12    lisinopril (PRINIVIL, ZESTRIL) 40 mg tablet Take 1 tab daily, Hold meds if systolic DU<41,JJ diastolic BM<49,& if GI<09NAPOLEON PINA MD if systolic DR>671,OX diastolic CE>54 & if FG>606 bpm 90 Tab 1    cloNIDine HCl (CATAPRES) 0.2 mg tablet TAKE ONE TABLET BY MOUTH AT BEDTIME  OK TO CRUSH AND GIVE MEDS    Hold bp meds if systolic bp <05,  if diastolic bp <34, and if HR <60bpm, call MD if systolic BP > 616,  if diastolic bp >83 and if HR >100 bpm  Indications: Attention Deficit Disorder with Hyperactivity, high blood pressure 90 Tab 2    nebivolol (BYSTOLIC) 10 mg tablet TAKE 1 TABLET BY MOUTH TWICE A DAY  OK TO CRUSH AND GIVE MEDS,  Hold bp meds if systolic bp <96,  if diastolic bp <99, and if HR <60bpm, call MD if systolic BP > 036,  if diastolic bp >69 and if HR >100 bpm 60 Tab 6    cetirizine (ZYRTEC) 10 mg tablet TAKE 1 TABLET BY MOUTH DAILY FOR ALLERGIES ** OK TO CRUSH AND GIVE MEDS** 30 Tab PRN    omeprazole (PRILOSEC) 40 mg capsule TAKE 1 CAPSULE BY MOUTH EACH MORNING 30 MINUTES BEFORE BREAKFAST **OKTO OPEN CAPSULE AND GIVE CONTENTS** 30 Cap PRN    acetaminophen (TYLENOL) 325 mg tablet Take 2 tablets by mouth every 4 hours as needed for needed (Patient not taking: Reported on 7/16/2021)      fluticasone (FLONASE) 50 mcg/actuation nasal spray BLOW NOSE: 2 SPRAYS IN EACH NOSTRIL DAILY FOR ALLERGY. 16 g 11    OTHER Pardeep's baby shampoo. use as lid scrub to each eye once daily      ciclopirox (PENLAC) 8 % solution APPLY TO AFFECTED AREA TWICE DAILY EXTERNALLY AS DIRECTED. (Patient taking differently: Apply on toenails at bedtime and wipe off with polish removal in the morning) 6.6 mL 11    Miscellaneous Medical Supply (OCUSOFT EYELID CLEANSING PADS) pads by Does Not Apply route.  alcaftadine (LASTACAFT) 0.25 % drop Apply  to eye. (Patient not taking: Reported on 9/7/2021)      albuterol (PROVENTIL HFA, VENTOLIN HFA, PROAIR HFA) 90 mcg/actuation inhaler Take 1 puff by inhalation every four (4) hours as needed for Wheezing. 1 Inhaler 1     Allergies   Allergen Reactions    Zofran [Ondansetron Hcl] Itching     Arm turned red & itched    No Known Allergies Hives     Past Medical History:   Diagnosis Date    Acute pharyngitis 9/29/2014    Agitation requiring sedation protocol 4/11/2018    Bronchitis, acute 12/12/2013    Cellulitis 7/26/2018    Cellulitis of groin 3/23/2016    Chronic rhinitis 4/11/2018    Contact dermatitis and other eczema, due to unspecified cause     Decrease in appetite 12/12/2013    Diabetes ([facility] [address]     Dysphagia 10/22/2014    Fall at nursing home 7/24/2017    Fever in adult 9/29/2014    Hypertension     Inguinal bulge 4/11/2018    Mental retardation     Prediabetes 10/2/2014    Screening for glaucoma 12/3/2014    Seizure ([address] 11/27/2017    Stool incontinence 4/11/2018     Past Surgical History:   Procedure Laterality Date    HX HEENT      dental surg.  June 7, 2010     Family History   Family history unknown: Yes     Social History     Tobacco Use    Smoking status: Never Smoker    Smokeless tobacco: Never Used   Substance Use Topics    Alcohol use: No      Lab Results   Component Value Date/Time    Hemoglobin A1c 8.2 (H) 07/16/2021 05:24 PM    Hemoglobin A1c 6.6 (H) 01/27/2020 03:48 PM    Hemoglobin A1c 6.0 (H) 07/24/2017 01:13 PM    Glucose 159 (H) 07/16/2021 05:24 PM    Microalb/Creat ratio (ug/mg creat.) 7.6 07/25/2017 04:30 PM    LDL, calculated 118.8 (H) 07/16/2021 05:24 PM    Creatinine 0.98 07/16/2021 05:24 PM      Lab Results   Component Value Date/Time    Cholesterol, total 200 (H) 07/16/2021 05:24 PM    HDL Cholesterol 47 07/16/2021 05:24 PM    LDL, calculated 118.8 (H) 07/16/2021 05:24 PM    Triglyceride 171 (H) 07/16/2021 05:24 PM    CHOL/HDL Ratio 4.3 07/16/2021 05:24 PM        Review of Systems   Unable to perform ROS: Medical condition   All other systems reviewed and are negative. Physical Exam  HENT:      Head: Normocephalic and atraumatic. Nose: No congestion. Neurological:      Mental Status: He is alert. Mental status is at baseline. Psychiatric:         Behavior: Behavior normal.         ASSESSMENT and PLAN  Diagnoses and all orders for this visit:    1. Type 2 diabetes mellitus with hyperglycemia, without long-term current use of insulin (HCC)  -     REFERRAL TO NUTRITION  -     metFORMIN (GLUCOPHAGE) 1,000 mg tablet; Take 1 Tablet by mouth two (2) times daily (with meals). 2. Advice given about COVID-19 virus infection          Patient advised to have the mask on most of the time, social distance and handwashing avoid crowded area pursuant to the emergency declaration under the [employer] [facility], 1135 waiver authority and the [employer] and [employer] Act, this Virtual Visit was conducted, with patient's consent, to reduce the patient's risk of exposure to COVID-19 and provide continuity of care for an established patient  Services were provided through a Video synchronous discussion virtually to substitute for in-person appointment.

## 2021-09-23 NOTE — PROGRESS NOTES
Chief Complaint   Patient presents with    Blood sugar problem     1. Have you been to the ER, urgent care clinic since your last visit? Hospitalized since your last visit? No    2. Have you seen or consulted any other health care providers outside of the [address] [place] [address] since your last visit? Include any pap smears or colon screening. No     Verified patient with two type of identifiers. caregiver Hernandonaina present, stated pts blood sugars have been between 180-200 prior to meals,  Needs protocol in place for group home.

## 2021-09-29 ENCOUNTER — OFFICE VISIT (OUTPATIENT)
Dept: SLEEP MEDICINE | Age: 46
End: 2021-09-29
Payer: MEDICARE

## 2021-09-29 DIAGNOSIS — G93.49 ENCEPHALOPATHY CHRONIC: ICD-10-CM

## 2021-09-29 DIAGNOSIS — G47.33 OSA (OBSTRUCTIVE SLEEP APNEA): Primary | ICD-10-CM

## 2021-09-29 PROCEDURE — G8417 CALC BMI ABV UP PARAM F/U: HCPCS | Performed by: SPECIALIST

## 2021-09-29 PROCEDURE — 99204 OFFICE O/P NEW MOD 45 MIN: CPT | Performed by: SPECIALIST

## 2021-09-29 PROCEDURE — G8756 NO BP MEASURE DOC: HCPCS | Performed by: SPECIALIST

## 2021-09-29 PROCEDURE — G8428 CUR MEDS NOT DOCUMENT: HCPCS | Performed by: SPECIALIST

## 2021-09-29 PROCEDURE — G8432 DEP SCR NOT DOC, RNG: HCPCS | Performed by: SPECIALIST

## 2021-09-29 NOTE — PROGRESS NOTES
[address] [facility] [place]. [place], [address]  Tel.  [phone]  Fax. 100 [facility] 60  [place], [address] [facility]  Tel.  [phone]  Fax. [phone] [address] [facility] [place]   Tel.  [phone]  Fax. [phone]       Chief Complaint       Chief Complaint   Patient presents with    Sleep Problem     snoring       HPI      Kevin Gomez is 48 y.o. male seen for evaluation of a sleep disorder. He is a resident at [address]; personnel from that institution accompany him. He is nonverbal.  Did not respond to specific directions. They note prominent snoring and apparent apnea. They note that he will easily doze throughout the day. The patient has not undergone diagnostic testing for the current problems. Allergies   Allergen Reactions    Zofran [Ondansetron Hcl] Itching     Arm turned red & itched    No Known Allergies Hives       Current Outpatient Medications   Medication Sig Dispense Refill    metFORMIN (GLUCOPHAGE) 1,000 mg tablet Take 1 Tablet by mouth two (2) times daily (with meals). 60 Tablet 5    flash glucose scanning reader (FreeStyle Saranya 2 Sinton) misc Tid prn 1 Each 2    flash glucose sensor (FreeStyle Saranya 2 Sensor) kit Tid prn 1 Kit 3    furosemide (LASIX) 40 mg tablet One tab daily 30 Tablet 2    albuterol-ipratropium (DUO-NEB) 2.5 mg-0.5 mg/3 ml nebu 3 mL by Nebulization route four (4) times daily as needed for Wheezing. 30 Nebule 2    ergocalciferol (ERGOCALCIFEROL) 1,250 mcg (50,000 unit) capsule Take 1 Cap by mouth every seven (7) days. 4 Cap 11    docusate sodium (COLACE) 100 mg capsule TAKE (1) CAPSULE BY MOUTH TWICE DAILY 60 Cap PRN    magnesium hydroxide (GARIBAY MILK OF MAGNESIA) 400 mg/5 mL suspension Take 30 mL by mouth nightly as needed for Constipation. Give ONLY if patient has  no bowel movement in three days.  354 mL 1    lisinopril (PRINIVIL, ZESTRIL) 40 mg tablet Take 1 tab daily, Hold meds if systolic NJ<92,AQ diastolic FM<13,& if IO<24HNS,DKKG MD if systolic AX>402,ZS diastolic SC>42 & if GB>675 bpm 90 Tab 1    cloNIDine HCl (CATAPRES) 0.2 mg tablet TAKE ONE TABLET BY MOUTH AT BEDTIME  OK TO CRUSH AND GIVE MEDS    Hold bp meds if systolic bp <80,  if diastolic bp <39, and if HR <60bpm, call MD if systolic BP > 845,  if diastolic bp >97 and if HR >100 bpm  Indications: Attention Deficit Disorder with Hyperactivity, high blood pressure 90 Tab 2    cetirizine (ZYRTEC) 10 mg tablet TAKE 1 TABLET BY MOUTH DAILY FOR ALLERGIES ** OK TO CRUSH AND GIVE MEDS** 30 Tab PRN    fluticasone (FLONASE) 50 mcg/actuation nasal spray BLOW NOSE: 2 SPRAYS IN EACH NOSTRIL DAILY FOR ALLERGY. 16 g 11    ciclopirox (PENLAC) 8 % solution APPLY TO AFFECTED AREA TWICE DAILY EXTERNALLY AS DIRECTED. (Patient taking differently: Apply on toenails at bedtime and wipe off with polish removal in the morning) 6.6 mL 11    Miscellaneous Medical Supply (OCUSOFT EYELID CLEANSING PADS) pads by Does Not Apply route.  albuterol (PROVENTIL HFA, VENTOLIN HFA, PROAIR HFA) 90 mcg/actuation inhaler Take 1 puff by inhalation every four (4) hours as needed for Wheezing. 1 Inhaler 1    triamcinolone acetonide (KENALOG) 0.1 % topical cream Apply  to affected area two (2) times a day. use thin layer (Patient not taking: Reported on 9/7/2021) 15 g 0    potassium chloride SR (KLOR-CON 10) 10 mEq tablet TAKE 1 TABLET BY MOUTH DAILY DO NOT HU MUST SWALLOW WHOLE 28 Tablet 2    vit b comp & c-fa-copper-zinc (Folbee Plus) 5-1.5-25 mg tab Take 1 Tab by mouth daily. 90 Tab 3    phenol-glycerin (Chloraseptic Max Sore Throat) 1.5-33 % spry 1 Actuation(s) by Mucous Membrane route four (4) times daily as needed for Cough. (Patient not taking: Reported on 9/7/2021) 1 Bottle 1    benzonatate (Tessalon Perles) 100 mg capsule Tessalon Perles 100 mg capsule   Take 1 capsule 3 times a day by oral route.  (Patient not taking: Reported on 7/16/2021)      montelukast (SINGULAIR) 10 mg tablet TAKE 1 TABLET BY MOUTH DAILY ** OK TO CRUSH AND GIVE MEDS** 30 Tab PRN    polyethylene glycol (MIRALAX) 17 gram packet Take 1 Packet by mouth daily. Hold for loose stool 30 Each 12    nebivolol (BYSTOLIC) 10 mg tablet TAKE 1 TABLET BY MOUTH TWICE A DAY  OK TO CRUSH AND GIVE MEDS,  Hold bp meds if systolic bp <82,  if diastolic bp <77, and if HR <60bpm, call MD if systolic BP > 329,  if diastolic bp >47 and if HR >100 bpm 60 Tab 6    omeprazole (PRILOSEC) 40 mg capsule TAKE 1 CAPSULE BY MOUTH EACH MORNING 30 MINUTES BEFORE BREAKFAST **OKTO OPEN CAPSULE AND GIVE CONTENTS** 30 Cap PRN    acetaminophen (TYLENOL) 325 mg tablet Take 2 tablets by mouth every 4 hours as needed for needed (Patient not taking: Reported on 7/16/2021)      OTHER Pardeep's baby shampoo. use as lid scrub to each eye once daily      alcaftadine (LASTACAFT) 0.25 % drop Apply  to eye. (Patient not taking: Reported on 9/7/2021)          He  has a past medical history of Acute pharyngitis (9/29/2014), Agitation requiring sedation protocol (4/11/2018), Bronchitis, acute (12/12/2013), Cellulitis (7/26/2018), Cellulitis of groin (3/23/2016), Chronic rhinitis (4/11/2018), Contact dermatitis and other eczema, due to unspecified cause, Decrease in appetite (12/12/2013), Diabetes ([address] Dysphagia (10/22/2014), Fall at nursing home (7/24/2017), Fever in adult (9/29/2014), Hypertension, Inguinal bulge (4/11/2018), Mental retardation, Prediabetes (10/2/2014), Screening for glaucoma (12/3/2014), Seizure ([address] (11/27/2017), and Stool incontinence (4/11/2018). He  has a past surgical history that includes hx heent. He Family history is unknown by patient. He  reports that he has never smoked. He has never used smokeless tobacco. He reports that he does not drink alcohol and does not use drugs.      Review of Systems:  ROS      Objective:       General:   Does not follow verbal commands   Eyes: Roving eye movements   Oropharynx:   Limited cooperation, cannot access oral airway                   Skin:   no obvious rashes   Neuro:   face symmetrical            Assessment:       ICD-10-CM ICD-9-CM    1. KIANA (obstructive sleep apnea)  G47.33 327.23 SLEEP STUDY UNATTENDED, 4 CHANNEL   2. Encephalopathy chronic  G93.49 348.39        History suggestive of sleep disordered breathing. Have discussed trying to obtain a home sleep study with the group home staff. Plan:     Orders Placed This Encounter    SLEEP STUDY UNATTENDED, 4 CHANNEL     Order Specific Question:   Reason for Exam     Answer:   snoring       * Patient has a history consistent with the diagnosis of sleep apnea. *Home sleep testing was ordered for initial evaluation. * Treatment options if indicated were reviewed today. Instructions:  o Group home staff will be educated on correct placement of home sleep test.          Jose Alejandro Nielsen MD, FAASM  Electronically signed 09/29/21       This note was created using voice recognition software. Despite editing, there may be syntax errors. This note will not be viewable in [address]

## 2021-10-05 ENCOUNTER — OFFICE VISIT (OUTPATIENT)
Dept: SLEEP MEDICINE | Age: 46
End: 2021-10-05

## 2021-10-05 ENCOUNTER — HOSPITAL ENCOUNTER (OUTPATIENT)
Dept: SLEEP MEDICINE | Age: 46
Discharge: HOME | End: 2021-10-05
Payer: MEDICARE

## 2021-10-05 DIAGNOSIS — G47.33 OSA (OBSTRUCTIVE SLEEP APNEA): Primary | ICD-10-CM

## 2021-10-05 PROCEDURE — 95806 SLEEP STUDY UNATT&RESP EFFT: CPT | Performed by: SPECIALIST

## 2021-10-05 NOTE — PROGRESS NOTES
[address] [facility] [place]. [place], [address]  Tel.  [phone]  Fax. 100 [facility] 60  [place], [address] [facility]  Tel.  [phone]  Fax. [phone] [address] [facility] [address]  Tel.  [phone]  Fax. [phone]       S>Eladio Hitchcock is a 48 y.o. male seen today to receive a home sleep testing unit (HST). · Patient was educated on proper hookup and operation of the HST. · Instruction forms and documentation were reviewed and signed. · The patient demonstrated good understanding of the HST.    O>    There were no vitals taken for this visit. A>  No diagnosis found. P>  · General information regarding operations and maintenance of the device was provided. · He was provided information on sleep apnea including coresponding risk factors and the importance of proper treatment. · Follow-up appointment was made to return the HST. He will be contacted once the results have been reviewed. · He was asked to contact our office for any problems regarding his home sleep test study.     · HSAT SN # [id number]

## 2021-10-11 ENCOUNTER — TELEPHONE (OUTPATIENT)
Dept: SLEEP MEDICINE | Age: 46
End: 2021-10-11

## 2021-10-11 DIAGNOSIS — G47.33 OSA (OBSTRUCTIVE SLEEP APNEA): Primary | ICD-10-CM

## 2021-10-11 NOTE — TELEPHONE ENCOUNTER
HSAT performed for potential sleep disordered breathing. Study labeled as failed due to poor P flow signal.    Recommendation: Attended polysomnogram.     Patient is a resident of a group home; personnel will need to accompany him. Sleep technologist: Please schedule PSG.

## 2021-10-15 NOTE — TELEPHONE ENCOUNTER
Reviewed sleep study results with patients caregtatiana Bal). She expressed understanding and is willing to proceed with a PSG.

## 2021-10-25 RX ORDER — LANCETS 28 GAUGE
EACH MISCELLANEOUS
Qty: 100 LANCET | Refills: 2 | Status: SHIPPED | OUTPATIENT
Start: 2021-10-25

## 2021-12-01 ENCOUNTER — TELEPHONE (OUTPATIENT)
Dept: PHARMACY | Age: 46
End: 2021-12-01

## 2021-12-01 NOTE — TELEPHONE ENCOUNTER
Melba Acuna MD - would patient benefit from statin therapy? · Note patient has upcoming appointment schedule with you 12/7/21  Please consider adding rosuvastatin 10 mg (or other) daily to your patient's regimen for the following reason:   · Patient has been identified as a statin use in persons with diabetes care gap per Giancarlo  · ADA Guidelines Age: >/= 36years old:   o No history of ASCVD or 10-year ASCVD risk < 20% - moderate-intensity statin is recommended. Patient's calculated risk score is 19%    I can contact the patient to discuss any changes in therapy. Thank you,  Marika Sauer, PharmD, [facility], [place]. [address]  Toll free: [phone], option 2  =====================================================  POPULATION [facility] CLINICAL PHARMACY: STATIN THERAPY REVIEW  Identified statin use in persons with diabetes care gap per [employer] Records dated: 11/21/21. Patient identified as LIS = 3, therefore patient's co-pays are $0.00 through all phases of the benefit regardless of the days' supply dispensed    ASSESSMENT  ACE/ARB ADHERENCE  Per Insurance Records through 11/21/21 (2020 [place] = n/a%; YTD [place] = 99%; Potential Fail Date: PASS 2021):   Lisinopril 40 mg tablets last filled on 11/19/21 for 31 day supply. Next refill due: 12/20/21. At [address]    BP Readings from Last 3 Encounters:   09/07/21 (!) 147/98   07/16/21 (!) 176/110   03/04/21 (!) 131/96     Estimated Creatinine Clearance: 77.2 mL/min (by C-G formula based on SCr of 0.98 mg/dL). DIABETES ADHERENCE  Per Insurance Records through 11/21/21 ([address] [place] = n/a%; YTD PDC = 100%; Potential Fail Date: PASS 2021):   Metformin 1000 mg tablets last filled on 11/19/21 for 31 day supply.  Next refill due: 12/20/21 at [address]    Lab Results   Component Value Date/Time    Hemoglobin A1c 8.2 (H) 07/16/2021 05:24 PM    Hemoglobin A1c 6.6 (H) 01/27/2020 03:48 PM    Hemoglobin A1c 6.0 (H) 07/24/2017 01:13 PM    Hemoglobin A1c (POC) 5.9 03/28/2018 03:12 PM    Hemoglobin A1c (POC) 6.0 11/10/2015 09:24 AM    Hemoglobin A1c (POC) 6.1 07/09/2015 12:30 PM     NOTE A1c <9%    STATIN GAP IDENTIFIED    Lab Results   Component Value Date/Time    Cholesterol, total 200 (H) 07/16/2021 05:24 PM    HDL Cholesterol 47 07/16/2021 05:24 PM    LDL, calculated 118.8 (H) 07/16/2021 05:24 PM    VLDL, calculated 34.2 07/16/2021 05:24 PM    Triglyceride 171 (H) 07/16/2021 05:24 PM    CHOL/HDL Ratio 4.3 07/16/2021 05:24 PM     ALT (SGPT)   Date Value Ref Range Status   07/16/2021 28 12 - 78 U/L Final     AST (SGOT)   Date Value Ref Range Status   07/16/2021 8 (L) 15 - 37 U/L Final     The 10-year ASCVD risk score (Atilio Bell, et al., 2013) is: 19%    Values used to calculate the score:      Age: 48 years      Sex: Male      Is Non- : Yes      Diabetic: Yes      Tobacco smoker: No      Systolic Blood Pressure: 630 mmHg      Is BP treated: Yes      HDL Cholesterol: 47 MG/DL      Total Cholesterol: 200 MG/DL     Hyperlipidemia Goal: Patient is not prescribed moderate-intensity statin therapy. 2019 ADA Guidelines Age:   [place]  >/= 36years old:   o No history of ASCVD or 10-year ASCVD risk < 20% - moderate-intensity statin is recommended.   o History of ASCVD or 10-year ASCVD risk > 20% - high-intensity statin is recommended. PLAN  The following are interventions that have been identified:  - Patient identified as having SUPD gap- patient has upcoming appointment with PCP. Note above sent.     Future Appointments   Date Time Provider Demetra Morales   12/7/2021  3:00 PM Matthias Quesada MD [facility] BS AMB   12/14/2021  1:30 PM Sammie Howard RD [facility]   12/14/2021  8:00 PM BEDROOM R [place] [address] [facility] SLEEP LAB GARO Fox, PharmD, [address] [place] [address]  Toll free: [phone], option 2    For Pharmacy Admin Tracking Only     CPA in place: No   Gap Closed?: No   Time Spent (min): 15

## 2021-12-14 ENCOUNTER — HOSPITAL ENCOUNTER (OUTPATIENT)
Dept: NUTRITION | Age: 46
End: 2021-12-14

## 2022-01-04 ENCOUNTER — VIRTUAL VISIT (OUTPATIENT)
Dept: FAMILY MEDICINE CLINIC | Age: 47
End: 2022-01-04
Payer: MEDICARE

## 2022-01-04 DIAGNOSIS — Z71.89 ADVICE GIVEN ABOUT COVID-19 VIRUS INFECTION: ICD-10-CM

## 2022-01-04 DIAGNOSIS — I16.0 HYPERTENSIVE URGENCY: ICD-10-CM

## 2022-01-04 DIAGNOSIS — K59.01 SLOW TRANSIT CONSTIPATION: ICD-10-CM

## 2022-01-04 DIAGNOSIS — E87.1 HYPONATREMIA: ICD-10-CM

## 2022-01-04 DIAGNOSIS — Z09 HOSPITAL DISCHARGE FOLLOW-UP: Primary | ICD-10-CM

## 2022-01-04 DIAGNOSIS — L02.214 ABSCESS OF GROIN, RIGHT: ICD-10-CM

## 2022-01-04 DIAGNOSIS — E11.65 TYPE 2 DIABETES MELLITUS WITH HYPERGLYCEMIA, WITHOUT LONG-TERM CURRENT USE OF INSULIN (HCC): ICD-10-CM

## 2022-01-04 DIAGNOSIS — R53.2 COMPLETE IMMOBILITY DUE TO SEVERE PHYSICAL DISABILITY OR FRAILTY (HCC): ICD-10-CM

## 2022-01-04 DIAGNOSIS — D69.6 PLATELETS DECREASED (HCC): ICD-10-CM

## 2022-01-04 DIAGNOSIS — R56.9 SEIZURE (HCC): ICD-10-CM

## 2022-01-04 PROCEDURE — 1111F DSCHRG MED/CURRENT MED MERGE: CPT | Performed by: FAMILY MEDICINE

## 2022-01-04 PROCEDURE — 99495 TRANSJ CARE MGMT MOD F2F 14D: CPT | Performed by: FAMILY MEDICINE

## 2022-01-04 PROCEDURE — G8427 DOCREV CUR MEDS BY ELIG CLIN: HCPCS | Performed by: FAMILY MEDICINE

## 2022-01-04 RX ORDER — CLINDAMYCIN PHOSPHATE 10 MG/G
GEL TOPICAL DAILY
COMMUNITY
Start: 2021-11-01

## 2022-01-04 RX ORDER — AMOXICILLIN 250 MG
1 CAPSULE ORAL DAILY
Qty: 30 TABLET | Refills: 0 | Status: SHIPPED | OUTPATIENT
Start: 2022-01-04 | End: 2022-01-05 | Stop reason: ALTCHOICE

## 2022-01-04 NOTE — PROGRESS NOTES
Chief Complaint   Patient presents with   [facility] Follow Up     abcess      1. Have you been to the ER, urgent care clinic since your last visit? Hospitalized since your last visit? Yes When: 11/23/21 Where: [facility] Reason for visit: groin abscess     2. Have you seen or consulted any other health care providers outside of the [address] [place] [address] since your last visit? Include any pap smears or colon screening. Yes When: 12/1/21  Where: dermatology Reason for visit: groin abscess     Call placed to pt. Verified patient with two type of identifiers.  Spoke with Josefina, caregiver,  Pt admitted to [facility] on 11/23/21  For groin abcess, and bowel obstruction Discharged on 12/18/21  Advised to follow up with pcp -need follow up labwork

## 2022-01-04 NOTE — PROGRESS NOTES
Transitional Care Management Progress Note    Patient: Vaishali Downey  : 1975  PCP: Ryan Merchant MD    Date of admission: 3 weeks ago  Date of discharge: More than 2 weeks    Patient was contacted by Transitional Care Management services within two days after his discharge: Yes. This encounter and supporting documentation was reviewed if available. Medication reconciliation was performed today (2022). Assessment/Plan:     Diagnoses and all orders for this visit:    1. Hospital discharge follow-up  -     METABOLIC PANEL, COMPREHENSIVE; Future  -     [place] DISCHARGE MEDS RECONCILED W/ CURRENT OUTPATIENT MED LIST    2. Abscess of groin, right  -     METABOLIC PANEL, COMPREHENSIVE; Future    3. Complete immobility due to severe physical disability or frailty (HCC)  -     METABOLIC PANEL, COMPREHENSIVE; Future    4. Type 2 diabetes mellitus with hyperglycemia, without long-term current use of insulin (HCC)  -     METABOLIC PANEL, COMPREHENSIVE; Future    5. Seizure ([address]  -     METABOLIC PANEL, COMPREHENSIVE; Future    6. Platelets decreased (HCC)  -     METABOLIC PANEL, COMPREHENSIVE; Future    7. Hyponatremia  -     METABOLIC PANEL, COMPREHENSIVE; Future    8. Hypertensive urgency  -     METABOLIC PANEL, COMPREHENSIVE; Future    9. Slow transit constipation  -     METABOLIC PANEL, COMPREHENSIVE; Future    10. Advice given about COVID-19 virus infection  -     METABOLIC PANEL, COMPREHENSIVE; Future    Other orders  -     multivitamin (ONE A DAY) tablet; Take 1 Tablet by mouth daily.     Nausea vomiting while hospitalized and constipation resolved after patient enema patient was also referred to interventional radiologist rule out aspiration abscess, continue with current constipation medication  For uncontrolled blood pressure patient was given clonidine for the point and was given IV hydralazine with a better outcome  The complexity of medical decision making for this patient's transitional care is high History of hyponatremia for which patient needs to get sodium level check in 2 to 4 weeks  Follow-up and Dispositions    · Return in 1 month (on 2/4/2022), or if symptoms worsen or fail to improve. Subjective:   Dejon Kirk is a 48 y.o. male presenting today for follow-up after being discharged from [facility].  The discharge summary was reviewed or requested. The main problem requiring admission was groin pain for which patient was diagnosed with right groin abscess and hypertensive urgency patient very sensitive to outside environment lives in the assisted living wheelchair dependent, usually very hard to have his blood pressure correctly obtained patient was put on vancomycin and Zosyn for which develop acute kidney injury nephrologist was consulted patient also was dealing with constipation and was given enema patient was discharged on December 18, 2021 in a stable condition     Complications during admission: none    Interval history/Current status: stable    Admitting symptoms have: improved    Medications marked \"taking\" at this time:  Home Medications    Medication Sig Start Date End Date Taking? Authorizing Provider   clindamycin (CLINDAGEL) 1 % topical gel Apply  to affected area daily. 11/1/21  Yes Provider, Historical   Easy Touch Safety Lancets 28 gauge misc USE TO CHECK BLOOD SUGAR FOR DIABETIC MONITORING. 10/25/21  Yes Dominick Yun MD   metFORMIN (GLUCOPHAGE) 1,000 mg tablet Take 1 Tablet by mouth two (2) times daily (with meals). 9/23/21  Yes Dominick Yun MD   furosemide (LASIX) 40 mg tablet One tab daily 7/16/21  Yes Dominick Yun MD   potassium chloride SR (KLOR-CON 10) 10 mEq tablet TAKE 1 TABLET BY MOUTH DAILY DO NOT HU MUST SWALLOW WHOLE 7/16/21  Yes Dominick Yun MD   vit b comp & c-fa-copper-zinc (Folbee Plus) 5-1.5-25 mg tab Take 1 Tab by mouth daily.  2/25/21  Yes Dominick Yun MD   ergocalciferol (ERGOCALCIFEROL) 1,250 mcg (50,000 unit) capsule Take 1 Cap by mouth every seven (7) days. 2/9/21  Yes Danisha Carroll MD   docusate sodium (COLACE) 100 mg capsule TAKE (1) CAPSULE BY MOUTH TWICE DAILY 6/12/19  Yes Danisha Carroll MD   montelukast (SINGULAIR) 10 mg tablet TAKE 1 TABLET BY MOUTH DAILY ** OK TO CRUSH AND GIVE MEDS** 6/12/19  Yes Danisha Carroll MD   magnesium hydroxide (GARIBAY MILK OF MAGNESIA) 400 mg/5 mL suspension Take 30 mL by mouth nightly as needed for Constipation. Give ONLY if patient has  no bowel movement in three days. 6/12/19  Yes Danisha Carroll MD   polyethylene glycol (MIRALAX) 17 gram packet Take 1 Packet by mouth daily.  Hold for loose stool 6/12/19  Yes Danisha Carroll MD   lisinopril (PRINIVIL, ZESTRIL) 40 mg tablet Take 1 tab daily, Hold meds if systolic WZ<07,BA diastolic DA<52,& if TB<58GAC,UKQD MD if systolic BL>509,EW diastolic DD>93 & if NH>668 bpm 4/8/19  Yes Danisha Carroll MD   cloNIDine HCl (CATAPRES) 0.2 mg tablet TAKE ONE TABLET BY MOUTH AT BEDTIME  OK TO CRUSH AND GIVE MEDS    Hold bp meds if systolic bp <75,  if diastolic bp <90, and if HR <60bpm, call MD if systolic BP > 271,  if diastolic bp >65 and if HR >100 bpm  Indications: Attention Deficit Disorder with Hyperactivity, high blood pressure 4/8/19  Yes Danisha Carroll MD   nebivolol (BYSTOLIC) 10 mg tablet TAKE 1 TABLET BY MOUTH TWICE A DAY  OK TO CRUSH AND GIVE MEDS,  Hold bp meds if systolic bp <26,  if diastolic bp <14, and if HR <60bpm, call MD if systolic BP > 859,  if diastolic bp >05 and if HR >100 bpm 4/8/19  Yes Danisha Carroll MD   cetirizine (ZYRTEC) 10 mg tablet TAKE 1 TABLET BY MOUTH DAILY FOR ALLERGIES ** OK TO CRUSH AND GIVE MEDS** 11/13/18  Yes Danisha Carroll MD   omeprazole (PRILOSEC) 40 mg capsule TAKE 1 CAPSULE BY MOUTH EACH MORNING 30 MINUTES BEFORE BREAKFAST **OKTO OPEN CAPSULE AND GIVE CONTENTS** 9/17/18  Yes Danisha Carroll MD   fluticasone (FLONASE) 50 mcg/actuation nasal spray BLOW NOSE: 2 SPRAYS IN [facility] NOSTRIL DAILY FOR ALLERGY. 6/1/18  Yes Cristian Leblanc MD   OTHER Pardeep's baby shampoo. use as lid scrub to each eye once daily   Yes Provider, Historical   ciclopirox (PENLAC) 8 % solution APPLY TO AFFECTED AREA TWICE DAILY EXTERNALLY AS DIRECTED. Patient taking differently: Apply on toenails at bedtime and wipe off with polish removal in the morning 12/7/17  Yes MD [employer] (OCUSOFT EYELID CLEANSING PADS) pads by Does Not Apply route. Yes Provider, Historical   flash glucose scanning reader (FreeStyle Saranya 2 [place]) misc Tid prn  Patient not taking: Reported on 1/4/2022 9/7/21   Candis Garvin MD   flash glucose sensor (FreeStyle Saranya 2 Sensor) kit Tid prn  Patient not taking: Reported on 1/4/2022 9/7/21   Candis Garvin MD   triamcinolone acetonide (KENALOG) 0.1 % topical cream Apply  to affected area two (2) times a day. use thin layer  Patient not taking: Reported on 9/7/2021 8/18/21   Candis Garvin MD   albuterol-ipratropium (DUO-NEB) 2.5 mg-0.5 mg/3 ml nebu 3 mL by Nebulization route four (4) times daily as needed for Wheezing. Patient not taking: Reported on 1/4/2022 2/17/21   Candis Garvin MD   phenol-glycerin (Chloraseptic Max Sore Throat) 1.5-33 % spry 1 Actuation(s) by Mucous Membrane route four (4) times daily as needed for Cough. Patient not taking: Reported on 9/7/2021 2/10/21   Candis Garvin MD   benzonatate (Tessalon Perles) 100 mg capsule Tessalon Perles 100 mg capsule   Take 1 capsule 3 times a day by oral route. Patient not taking: Reported on 7/16/2021    Provider, Historical   acetaminophen (TYLENOL) 325 mg tablet Take 2 tablets by mouth every 4 hours as needed for needed  Patient not taking: Reported on 7/16/2021    Provider, Historical   alcaftadine (LASTACAFT) 0.25 % drop Apply  to eye.   Patient not taking: Reported on 9/7/2021    Provider, Historical   albuterol (PROVENTIL HFA, VENTOLIN HFA, PROAIR HFA) 90 mcg/actuation inhaler Take 1 puff by inhalation every four (4) hours as needed for Wheezing. Patient not taking: Reported on 1/4/2022 9/29/14   Deepti Whitlock MD        Review of Systems:      Constitutional: no chills and fever, obese, nad     HENT: no ear head pain and nosebleeds. No blurred vision, pain and discharge. Respiratory: no shortness of breath, wheezing cough nor sore throat. Cardiovascular: Has no chest pain, leg swelling ,and racing heart . Gastrointestinal: No constipation, diarrhea, nausea and vomiting. Genitourinary: No frequency. Musculoskeletal: Negative for joint pain. Skin: no itching, pimples or acne rash. Neurological: Negative for dizziness, no tremors  Psychiatric/Behavioral: Negative for depression has normal interest to do things and not depressed the patient is not nervous/anxious. Current Outpatient Medications   Medication Sig Dispense Refill    multivitamin (ONE A DAY) tablet Take 1 Tablet by mouth daily. 30 Tablet 5    clindamycin (CLINDAGEL) 1 % topical gel Apply  to affected area daily.  Easy Touch Safety Lancets 28 gauge misc USE TO CHECK BLOOD SUGAR FOR DIABETIC MONITORING. 100 Lancet 2    metFORMIN (GLUCOPHAGE) 1,000 mg tablet Take 1 Tablet by mouth two (2) times daily (with meals). 60 Tablet 5    furosemide (LASIX) 40 mg tablet One tab daily 30 Tablet 2    potassium chloride SR (KLOR-CON 10) 10 mEq tablet TAKE 1 TABLET BY MOUTH DAILY DO NOT HU MUST SWALLOW WHOLE 28 Tablet 2    ergocalciferol (ERGOCALCIFEROL) 1,250 mcg (50,000 unit) capsule Take 1 Cap by mouth every seven (7) days.  4 Cap 11    montelukast (SINGULAIR) 10 mg tablet TAKE 1 TABLET BY MOUTH DAILY ** OK TO CRUSH AND GIVE MEDS** 30 Tab PRN    lisinopril (PRINIVIL, ZESTRIL) 40 mg tablet Take 1 tab daily, Hold meds if systolic AA<43,AG diastolic FG<98,& if [id number] MD if systolic RN>136,BX diastolic UR>29 & if RK>808 bpm 90 Tab 1    cloNIDine HCl (CATAPRES) 0.2 mg tablet TAKE ONE TABLET BY MOUTH AT BEDTIME  OK TO CRUSH AND GIVE MEDS    Hold bp meds if systolic bp <65,  if diastolic bp <30, and if HR <60bpm, call MD if systolic BP > 385,  if diastolic bp >34 and if HR >100 bpm  Indications: Attention Deficit Disorder with Hyperactivity, high blood pressure 90 Tab 2    nebivolol (BYSTOLIC) 10 mg tablet TAKE 1 TABLET BY MOUTH TWICE A DAY  OK TO CRUSH AND GIVE MEDS,  Hold bp meds if systolic bp <99,  if diastolic bp <78, and if HR <60bpm, call MD if systolic BP > 994,  if diastolic bp >33 and if HR >100 bpm 60 Tab 6    cetirizine (ZYRTEC) 10 mg tablet TAKE 1 TABLET BY MOUTH DAILY FOR ALLERGIES ** OK TO CRUSH AND GIVE MEDS** 30 Tab PRN    omeprazole (PRILOSEC) 40 mg capsule TAKE 1 CAPSULE BY MOUTH EACH MORNING 30 MINUTES BEFORE BREAKFAST **OKTO OPEN CAPSULE AND GIVE CONTENTS** 30 Cap PRN    fluticasone (FLONASE) 50 mcg/actuation nasal spray BLOW NOSE: 2 SPRAYS IN EACH NOSTRIL DAILY FOR ALLERGY. 16 g 11    OTHER Pardeep's baby shampoo. use as lid scrub to each eye once daily      ciclopirox (PENLAC) 8 % solution APPLY TO AFFECTED AREA TWICE DAILY EXTERNALLY AS DIRECTED. (Patient taking differently: Apply on toenails at bedtime and wipe off with polish removal in the morning) 6.6 mL 11    Misc[employer] (OCUSOFT EYELID CLEANSING PADS) pads by Does Not Apply route.  flash glucose scanning reader (FreeStyle Saranya 2 [place]) misc Tid prn (Patient not taking: Reported on 1/4/2022) 1 Each 2    flash glucose sensor (FreeStyle Saranya 2 Sensor) kit Tid prn (Patient not taking: Reported on 1/4/2022) 1 Kit 3    triamcinolone acetonide (KENALOG) 0.1 % topical cream Apply  to affected area two (2) times a day. use thin layer (Patient not taking: Reported on 9/7/2021) 15 g 0    albuterol-ipratropium (DUO-NEB) 2.5 mg-0.5 mg/3 ml nebu 3 mL by Nebulization route four (4) times daily as needed for Wheezing.  (Patient not taking: Reported on 1/4/2022) 30 Nebule 2    phenol-glycerin (Chloraseptic Max Sore Throat) 1.5-33 % spry 1 Actuation(s) by Mucous Membrane route four (4) times daily as needed for Cough. (Patient not taking: Reported on 9/7/2021) 1 Bottle 1    acetaminophen (TYLENOL) 325 mg tablet Take 2 tablets by mouth every 4 hours as needed for needed (Patient not taking: Reported on 7/16/2021)      alcaftadine (LASTACAFT) 0.25 % drop Apply  to eye. (Patient not taking: Reported on 9/7/2021)      albuterol (PROVENTIL HFA, VENTOLIN HFA, PROAIR HFA) 90 mcg/actuation inhaler Take 1 puff by inhalation every four (4) hours as needed for Wheezing. (Patient not taking: Reported on 1/4/2022) 1 Inhaler 1     Allergies   Allergen Reactions    Zofran [Ondansetron Hcl] Itching     Arm turned red & itched    No Known Allergies Hives     Past Medical History:   Diagnosis Date    Acute pharyngitis 9/29/2014    Agitation requiring sedation protocol 4/11/2018    Bronchitis, acute 12/12/2013    Cellulitis 7/26/2018    Cellulitis of groin 3/23/2016    Chronic rhinitis 4/11/2018    Contact dermatitis and other eczema, due to unspecified cause     Decrease in appetite 12/12/2013    Diabetes ([address]     Dysphagia 10/22/2014    Fall at nursing home 7/24/2017    Fever in adult 9/29/2014    Hypertension     Inguinal bulge 4/11/2018    Mental retardation     Prediabetes 10/2/2014    Screening for glaucoma 12/3/2014    Seizure ([address] 11/27/2017    Stool incontinence 4/11/2018     Past Surgical History:   Procedure Laterality Date    HX HEENT      dental surg.  June 7, 2010     Family History   Family history unknown: Yes     Social History     Tobacco Use    Smoking status: Never Smoker    Smokeless tobacco: Never Used   Substance Use Topics    Alcohol use: No          Objective:     Patient-Reported Vitals 2/9/2021   Patient-Reported Weight 147lbs   Patient-Reported Pulse 84   Patient-Reported Temperature 98.6   Patient-Reported SpO2 98%  RA   Patient-Reported Systolic  370 Patient-Reported Diastolic 78      General: alert, cooperative, no distress   Mental  status: normal mood, behavior, speech, dress, motor activity, and thought processes, able to follow commands   HENT: NCAT   Neck: no visualized mass   Resp: no respiratory distress   Neuro: no gross deficits   Skin: no discoloration or lesions of concern on visible areas   Psychiatric: normal affect, consistent with stated mood, no evidence of hallucinations     Additional exam findings: We discussed the expected course, resolution and complications of the diagnosis(es) in detail. Medication risks, benefits, costs, interactions, and alternatives were discussed as indicated. I advised him to contact the office if his condition worsens, changes or fails to improve as anticipated. He expressed understanding with the diagnosis(es) and plan. Monica Bustamante, who was evaluated through a synchronous (real-time) audio-video encounter and/or his healthcare decision maker, is aware that it is a billable service, with coverage as determined by his insurance carrier. He provided verbal consent to proceed: Yes, and patient identification was verified. It was conducted pursuant to the emergency declaration under the [address] [place] [address], [address] [place] [address] authority and the [employer] General Act. A caregiver was present when appropriate. Ability to conduct physical exam was limited. I was at home. The patient was at home.       Elias Ramírez MD

## 2022-01-05 RX ORDER — BISMUTH SUBSALICYLATE 262 MG
1 TABLET,CHEWABLE ORAL DAILY
Qty: 30 TABLET | Refills: 5 | Status: SHIPPED | OUTPATIENT
Start: 2022-01-05

## 2022-01-12 ENCOUNTER — VIRTUAL VISIT (OUTPATIENT)
Dept: FAMILY MEDICINE CLINIC | Age: 47
End: 2022-01-12

## 2022-01-12 DIAGNOSIS — R19.7 NAUSEA VOMITING AND DIARRHEA: Primary | ICD-10-CM

## 2022-01-12 DIAGNOSIS — R11.2 NAUSEA VOMITING AND DIARRHEA: Primary | ICD-10-CM

## 2022-01-12 RX ORDER — PROMETHAZINE HYDROCHLORIDE AND CODEINE PHOSPHATE 6.25; 1 MG/5ML; MG/5ML
5 SOLUTION ORAL
Qty: 75 ML | Refills: 0 | Status: SHIPPED | OUTPATIENT
Start: 2022-01-12 | End: 2022-01-17

## 2022-01-12 NOTE — PROGRESS NOTES
HISTORY OF PRESENT ILLNESS  Sam Ji is a 48 y.o. male. Today's Covid vaccinated x3 Pt main concerns were provided in the clinic,    pt is w/ comorbid history and   Pt has been trying to wear mask most of the times,  pt has no fever no cough no dyspnea, no ha, not dizzy, nl smell nl taste, no N/V/D, has no orbital pain,  no body ache. N/V/D w/ recent hospitalization d/c on dec 23,   Started couple days no traveling, no recent hospital or nursing home visit, no party   outdoor food nor the famous potato salad, had some restaurant food few days, no vomitus but nausea feeling, watery stool, non bloody, no new meds, no family member with the same problem, with some abd cramps 4/10 comes and goes with ,     Current Outpatient Medications   Medication Sig Dispense Refill    multivitamin (ONE A DAY) tablet Take 1 Tablet by mouth daily. 30 Tablet 5    clindamycin (CLINDAGEL) 1 % topical gel Apply  to affected area daily.  Easy Touch Safety Lancets 28 gauge misc USE TO CHECK BLOOD SUGAR FOR DIABETIC MONITORING. 100 Lancet 2    metFORMIN (GLUCOPHAGE) 1,000 mg tablet Take 1 Tablet by mouth two (2) times daily (with meals). 60 Tablet 5    furosemide (LASIX) 40 mg tablet One tab daily 30 Tablet 2    potassium chloride SR (KLOR-CON 10) 10 mEq tablet TAKE 1 TABLET BY MOUTH DAILY DO NOT HU MUST SWALLOW WHOLE 28 Tablet 2    ergocalciferol (ERGOCALCIFEROL) 1,250 mcg (50,000 unit) capsule Take 1 Cap by mouth every seven (7) days.  4 Cap 11    montelukast (SINGULAIR) 10 mg tablet TAKE 1 TABLET BY MOUTH DAILY ** OK TO CRUSH AND GIVE MEDS** 30 Tab PRN    lisinopril (PRINIVIL, ZESTRIL) 40 mg tablet Take 1 tab daily, Hold meds if systolic CO<99,LG diastolic TC<08,& if YR<18VUS,GPTN MD if systolic YK>510,TL diastolic RZ>16 & if RT>163 bpm 90 Tab 1    cloNIDine HCl (CATAPRES) 0.2 mg tablet TAKE ONE TABLET BY MOUTH AT BEDTIME  OK TO CRUSH AND GIVE MEDS    Hold bp meds if systolic bp <22,  if diastolic bp <08, and if HR <60bpm, call MD if systolic BP > 200,  if diastolic bp >74 and if HR >100 bpm  Indications: Attention Deficit Disorder with Hyperactivity, high blood pressure 90 Tab 2    nebivolol (BYSTOLIC) 10 mg tablet TAKE 1 TABLET BY MOUTH TWICE A DAY  OK TO CRUSH AND GIVE MEDS,  Hold bp meds if systolic bp <72,  if diastolic bp <45, and if HR <60bpm, call MD if systolic BP > 838,  if diastolic bp >43 and if HR >100 bpm 60 Tab 6    cetirizine (ZYRTEC) 10 mg tablet TAKE 1 TABLET BY MOUTH DAILY FOR ALLERGIES ** OK TO CRUSH AND GIVE MEDS** 30 Tab PRN    omeprazole (PRILOSEC) 40 mg capsule TAKE 1 CAPSULE BY MOUTH EACH MORNING 30 MINUTES BEFORE BREAKFAST **OKTO OPEN CAPSULE AND GIVE CONTENTS** 30 Cap PRN    fluticasone (FLONASE) 50 mcg/actuation nasal spray BLOW NOSE: 2 SPRAYS IN EACH NOSTRIL DAILY FOR ALLERGY. 16 g 11    OTHER Pardeep's baby shampoo. use as lid scrub to each eye once daily      Miscellaneous Medical Supply (OCUSOFT EYELID CLEANSING PADS) pads by Does Not Apply route.  flash glucose scanning reader (FreeStyle Saranya 2 [place]) misc Tid prn (Patient not taking: Reported on 1/4/2022) 1 Each 2    flash glucose sensor (FreeStyle Saranya 2 Sensor) kit Tid prn (Patient not taking: Reported on 1/4/2022) 1 Kit 3    triamcinolone acetonide (KENALOG) 0.1 % topical cream Apply  to affected area two (2) times a day. use thin layer (Patient not taking: Reported on 9/7/2021) 15 g 0    albuterol-ipratropium (DUO-NEB) 2.5 mg-0.5 mg/3 ml nebu 3 mL by Nebulization route four (4) times daily as needed for Wheezing. (Patient not taking: Reported on 1/4/2022) 30 Nebule 2    phenol-glycerin (Chloraseptic Max Sore Throat) 1.5-33 % spry 1 Actuation(s) by Mucous Membrane route four (4) times daily as needed for Cough.  (Patient not taking: Reported on 9/7/2021) 1 Bottle 1    acetaminophen (TYLENOL) 325 mg tablet Take 2 tablets by mouth every 4 hours as needed for needed (Patient not taking: Reported on 7/16/2021)      ciclopirox (PENLAC) 8 % solution APPLY TO AFFECTED AREA TWICE DAILY EXTERNALLY AS DIRECTED. (Patient taking differently: Apply on toenails at bedtime and wipe off with polish removal in the morning) 6.6 mL 11    alcaftadine (LASTACAFT) 0.25 % drop Apply  to eye. (Patient not taking: Reported on 9/7/2021)      albuterol (PROVENTIL HFA, VENTOLIN HFA, PROAIR HFA) 90 mcg/actuation inhaler Take 1 puff by inhalation every four (4) hours as needed for Wheezing. (Patient not taking: Reported on 1/4/2022) 1 Inhaler 1     Allergies   Allergen Reactions    Zofran [Ondansetron Hcl] Itching     Arm turned red & itched    No Known Allergies Hives     Past Medical History:   Diagnosis Date    Acute pharyngitis 9/29/2014    Agitation requiring sedation protocol 4/11/2018    Bronchitis, acute 12/12/2013    Cellulitis 7/26/2018    Cellulitis of groin 3/23/2016    Chronic rhinitis 4/11/2018    Contact dermatitis and other eczema, due to unspecified cause     Decrease in appetite 12/12/2013    Diabetes ([facility] [address]     Dysphagia 10/22/2014    Fall at nursing home 7/24/2017    Fever in adult 9/29/2014    Hypertension     Inguinal bulge 4/11/2018    Mental retardation     Prediabetes 10/2/2014    Screening for glaucoma 12/3/2014    Seizure ([facility] 11/27/2017    Stool incontinence 4/11/2018     Past Surgical History:   Procedure Laterality Date    HX HEENT      dental surg.  June 7, 2010     Family History   Family history unknown: Yes     Social History     Tobacco Use    Smoking status: Never Smoker    Smokeless tobacco: Never Used   Substance Use Topics    Alcohol use: No      Lab Results   Component Value Date/Time    WBC 5.4 07/16/2021 05:24 PM    HGB 13.4 07/16/2021 05:24 PM    HCT 40.9 07/16/2021 05:24 PM    PLATELET 382 80/28/3584 05:24 PM    MCV 98.1 07/16/2021 05:24 PM     Lab Results   Component Value Date/Time    Hemoglobin A1c 8.2 (H) 07/16/2021 05:24 PM    Hemoglobin A1c 6.6 (H) 01/27/2020 03:48 PM Hemoglobin A1c 6.0 (H) 07/24/2017 01:13 PM    Glucose 114 (H) 01/05/2022 04:38 PM    Microalb/Creat ratio (ug/mg creat.) 7.6 07/25/2017 04:30 PM    LDL, calculated 118.8 (H) 07/16/2021 05:24 PM    Creatinine 1.00 01/05/2022 04:38 PM         Review of Systems   Unable to perform ROS: Mental acuity   Gastrointestinal: Positive for nausea and vomiting. All other systems reviewed and are negative. Physical Exam  Constitutional:       Appearance: Normal appearance. HENT:      Head: Normocephalic and atraumatic. Nose: Nose normal. No congestion. Neurological:      Mental Status: He is alert. Mental status is at baseline. Psychiatric:         Mood and Affect: Mood normal.         Behavior: Behavior normal.         Thought Content: Thought content normal.         Judgment: Judgment normal.         ASSESSMENT and PLAN  Diagnoses and all orders for this visit:    1. Nausea vomiting and diarrhea  -     C. DIFFICILE AG & TOXIN A/B; Future  -     promethazine-codeine (PHENERGAN with CODEINE) 6.25-10 mg/5 mL syrup; Take 5 mL by mouth three (3) times daily as needed for Cough for up to 5 days. Max Daily Amount: 15 mL.               Small meal more frequent, increase po fluid intake sport drinks, peptobismuth 2 tabs q8hrs, avoid heavy meals and overeating, no restaurant foods, meds side effect and compliance advised, rtc if not better in 3-5 days, tylenol for pain, observe for any sign of blood in urine or stool  obtain cbc, cmp  To ensure anemia and electrolytes román=f  Stop colrit for now, restart after lbm gone away  Promethazine w/ codeine for nausea and lbm  Pedialyte form pharm dilution with half 1:1 for the next 2-3 days  Look for improvement   Patient advised to have the test, VAX and the mask on most of the time, social distance and handwashing avoid crowded area pursuant to the emergency declaration under the [address] and [address] authority and the [facility] and Response Supplemental Appropriations Act, this Virtual Visit was conducted, with patient's consent, to reduce the patient's risk of exposure to COVID-19 and provide continuity of care for an established patient  Services were provided through a Video synchronous discussion virtually to substitute for in-person appointment.

## 2022-01-12 NOTE — PROGRESS NOTES
Chief Complaint   Patient presents with    Vomiting     1. Have you been to the ER, urgent care clinic since your last visit? Hospitalized since your last visit? No    2. Have you seen or consulted any other health care providers outside of the [address] [place] [address] since your last visit? Include any pap smears or colon screening. No    Call placed to pt. Verified patient with two type of identifiers.  Spoke with care giver Josefina,    c/o intermittent vomiting since Sunday, appetite Is fine, normal bowel movements

## 2022-01-24 ENCOUNTER — NURSE TRIAGE (OUTPATIENT)
Dept: OTHER | Facility: CLINIC | Age: 47
End: 2022-01-24

## 2022-01-24 NOTE — TELEPHONE ENCOUNTER
Received call from Campos Tapia at [facility] with Red Flag Complaint. Subjective: Caller states (Manuela, ) \"This morning, I noticed his left hand is swollen. When he closes his hand you cannot see his knuckles. The left hand is significantly cooler than the right hand. He also has bilateral swelling in his feet. I do not think it is painful, because a he does not grimace or pull away with palpation. \" Pt has a hx of arthritis in his fingers. Caller denies injury or wound that would indicate cellulitis. Current Symptoms: L hand swelling     Onset: 2 hours ago; worsening    Associated Symptoms: NA    Pain Severity: Pt is nonverbal. Pt is not grimacing. Temperature: Denies   What has been tried: Denies    LMP: NA Pregnant: NA    Recommended disposition: Go to ED now. Care advice provided, patient verbalizes understanding; denies any other questions or concerns; instructed to call back for any new or worsening symptoms. Patient proceeding to [facility] Emergency Department    Attention Provider: Thank you for allowing me to participate in the care of your patient. The patient was connected to triage in response to information provided to the [facility]. Please do not respond through this encounter as the response is not directed to a shared pool.     Reason for Disposition   Entire hand is cool or blue in comparison to other side    Protocols used: HAND [facility]

## 2022-02-01 ENCOUNTER — DOCUMENTATION ONLY (OUTPATIENT)
Dept: SLEEP MEDICINE | Age: 47
End: 2022-02-01

## 2022-02-01 ENCOUNTER — HOSPITAL ENCOUNTER (OUTPATIENT)
Dept: SLEEP MEDICINE | Age: 47
Discharge: HOME | End: 2022-02-01
Payer: MEDICARE

## 2022-02-01 VITALS
HEART RATE: 80 BPM | TEMPERATURE: 97.8 F | OXYGEN SATURATION: 94 % | SYSTOLIC BLOOD PRESSURE: 131 MMHG | DIASTOLIC BLOOD PRESSURE: 84 MMHG

## 2022-02-01 DIAGNOSIS — G47.33 OSA (OBSTRUCTIVE SLEEP APNEA): ICD-10-CM

## 2022-02-01 PROCEDURE — 95810 POLYSOM 6/> YRS 4/> PARAM: CPT | Performed by: SPECIALIST

## 2022-02-02 NOTE — PROGRESS NOTES
[address] [facility]. [place], [address]  Tel.  [phone]  Fax. [address] [facility] [address]  [place], [address] [facility]  Tel.  [phone]  Fax. [phone] [address]   Tel.  [phone]  Fax. [phone]     Sleep Study Technical Notes        PRE-Test:  · Jenna Shaikh (: 1975) arrived in the lobby. Patient was greeted, temperature checked (97.8 ) and screening questions asked. The patient was taken directly to their assigned room. BP (131/84) and SpO2 (94%) were taken. Procedure was explained to the patient and questions were answered. Pt expressed understanding. Electrodes were applied without incident. The patient was placed in bed and the study was started. Acquisition Notes:  · Lights off: 9:10pm  · Sleep onset: 9:11pm  · Respiratory events: Hypopneas, Obstructive, Central and Mixed Apnea noted  · ECG:  NSR  · Other comments: The study ordered was a PSG. The hook-up was performed without issue.     The Pt was placed in bed and lights were out at 9:10pm. Sleep onset occurred nearly immediately at 9:11pm. During the study stages 1, 2 and REM were observed.     The Pt slept on his right side. Mild snoring was heard. The Pt appeared to show signs of sleep-disordered breathing as some hypopneas, obstructive, central and mixed apneas were noted.     Lights were on at 5:00am.    POST Test:  Patient was awakened. Electrodes were removed. The patient was discharged after completing the Post Questionnaire. Patient stated that they were alert and ok to drive. Equipment and room cleaned per infection control policy.

## 2022-02-03 ENCOUNTER — TELEPHONE (OUTPATIENT)
Dept: SLEEP MEDICINE | Age: 47
End: 2022-02-03

## 2022-02-03 NOTE — TELEPHONE ENCOUNTER
Last visit:1/12/22  Next visit: 3/2/22  Previous refill: per med hx do not see where patient has had previously    Requested Prescriptions     Pending Prescriptions Disp Refills    Accu-Chek Guide test strips strip [Pharmacy Med Name: Rosalee Height TEST STRIP] 100 Strip 0     Sig: CHECK BLOOD SUGAR 2 TIMES A DAY AT [address] [place] [address]

## 2022-02-03 NOTE — TELEPHONE ENCOUNTER
PSG performed for potential sleep disordered breathing. 417 minutes recorded of which 440 minutes spent asleep with a sleep efficiency of 93.6%. Sleep onset at 1.5 minutes; REM onset at 292.5 minutes with total REM representing 8.2% of sleep time. 33 respiratory events occurred of which 1 hypopnea and 32 apnea (17 central, 1 mixed, 14 obstructive). The overall AHI was normal at 4.5/h. Minimal SaO2 91%. Mild snoring noted. Impression: PSG does not demonstrate significant sleep disordered breathing. Sleep technologist: Patient is a resident of a group home. Please advise patient/care providers of study results.

## 2022-02-04 RX ORDER — BLOOD SUGAR DIAGNOSTIC
STRIP MISCELLANEOUS
Qty: 100 STRIP | Refills: 0 | Status: SHIPPED | OUTPATIENT
Start: 2022-02-04 | End: 2022-04-19 | Stop reason: SDUPTHER

## 2022-02-10 ENCOUNTER — TELEPHONE (OUTPATIENT)
Dept: FAMILY MEDICINE CLINIC | Age: 47
End: 2022-02-10

## 2022-02-10 NOTE — TELEPHONE ENCOUNTER
----- Message from Pina Hall sent at 2/10/2022 12:22 PM EST -----  Subject: Message to Provider    QUESTIONS  Information for Provider? Pt would like to see Megan Dumont tomorrow   02/11/22 and he was already has an appointment with another PCP in the   office but if you could take him instead he would like that  ---------------------------------------------------------------------------  --------------  [address]  What is the best way for the office to contact you? OK to leave message on   voicemail  Preferred Call Back Phone Number? [phone]  ---------------------------------------------------------------------------  --------------  SCRIPT ANSWERS  Relationship to Patient? Third Party  Representative Name?  Anatoly Yeung

## 2022-02-10 NOTE — TELEPHONE ENCOUNTER
Returned  Call to pt. Spoke with care giver Josefina, no assist needed. Pt has an appt with Dr Mine Lewis tomorrow.  [email or web address]

## 2022-02-11 ENCOUNTER — OFFICE VISIT (OUTPATIENT)
Dept: FAMILY MEDICINE CLINIC | Age: 47
End: 2022-02-11
Payer: MEDICARE

## 2022-02-11 DIAGNOSIS — Z71.89 ADVICE GIVEN ABOUT COVID-19 VIRUS INFECTION: ICD-10-CM

## 2022-02-11 DIAGNOSIS — K52.1 ANTIBIOTIC-ASSOCIATED DIARRHEA: Primary | ICD-10-CM

## 2022-02-11 DIAGNOSIS — K59.1 FUNCTIONAL DIARRHEA: ICD-10-CM

## 2022-02-11 DIAGNOSIS — T36.95XA ANTIBIOTIC-ASSOCIATED DIARRHEA: Primary | ICD-10-CM

## 2022-02-11 PROCEDURE — G8432 DEP SCR NOT DOC, RNG: HCPCS | Performed by: FAMILY MEDICINE

## 2022-02-11 PROCEDURE — 99213 OFFICE O/P EST LOW 20 MIN: CPT | Performed by: FAMILY MEDICINE

## 2022-02-11 PROCEDURE — G8756 NO BP MEASURE DOC: HCPCS | Performed by: FAMILY MEDICINE

## 2022-02-11 PROCEDURE — G8427 DOCREV CUR MEDS BY ELIG CLIN: HCPCS | Performed by: FAMILY MEDICINE

## 2022-02-11 PROCEDURE — G8417 CALC BMI ABV UP PARAM F/U: HCPCS | Performed by: FAMILY MEDICINE

## 2022-02-11 RX ORDER — LEVOFLOXACIN 500 MG/1
250 TABLET, FILM COATED ORAL DAILY
COMMUNITY
End: 2022-02-11 | Stop reason: ALTCHOICE

## 2022-02-11 RX ORDER — LANOLIN ALCOHOL/MO/W.PET/CERES
CREAM (GRAM) TOPICAL
COMMUNITY
Start: 2022-01-28 | End: 2022-02-11 | Stop reason: ALTCHOICE

## 2022-02-11 RX ORDER — CLINDAMYCIN HYDROCHLORIDE 150 MG/1
150 CAPSULE ORAL 3 TIMES DAILY
COMMUNITY
End: 2022-03-02 | Stop reason: ALTCHOICE

## 2022-02-22 NOTE — PROGRESS NOTES
HISTORY OF PRESENT ILLNESS  Sam Ji is a 48 y.o. male, pt was presented by the case provider, stating that his problems much better,   Diarrhea  Had abscess was treated with 2 potent antibiotics the abscess opened upt almost gone away,     Currently has no fever, no temp, some gas no abd pain, n nausea and vomigitn   appetite improving,   no fhx of colon cancer, no tarry stool, no abdominal pain, eats varieties of foods,   currently had been compliant with meds, pt has much improved             Current Outpatient Medications   Medication Sig Dispense Refill    clindamycin (CLEOCIN) 150 mg capsule Take 150 mg by mouth three (3) times daily.  Accu-Chek Guide test strips strip CHECK BLOOD SUGAR 2 TIMES A DAY AT 8AM & 5PMFOR MONITORING. 100 Strip 0    clindamycin (CLINDAGEL) 1 % topical gel Apply  to affected area daily.  Easy Touch Safety Lancets 28 gauge misc USE TO CHECK BLOOD SUGAR FOR DIABETIC MONITORING. 100 Lancet 2    metFORMIN (GLUCOPHAGE) 1,000 mg tablet Take 1 Tablet by mouth two (2) times daily (with meals). 60 Tablet 5    albuterol-ipratropium (DUO-NEB) 2.5 mg-0.5 mg/3 ml nebu 3 mL by Nebulization route four (4) times daily as needed for Wheezing. 30 Nebule 2    ergocalciferol (ERGOCALCIFEROL) 1,250 mcg (50,000 unit) capsule Take 1 Cap by mouth every seven (7) days.  4 Cap 11    montelukast (SINGULAIR) 10 mg tablet TAKE 1 TABLET BY MOUTH DAILY ** OK TO CRUSH AND GIVE MEDS** 30 Tab PRN    lisinopril (PRINIVIL, ZESTRIL) 40 mg tablet Take 1 tab daily, Hold meds if systolic WY<05,HZ diastolic JK<70,& if ES<16TSM,NMCI MD if systolic AD>449,TG diastolic HX>98 & if SH>680 bpm 90 Tab 1    cloNIDine HCl (CATAPRES) 0.2 mg tablet TAKE ONE TABLET BY MOUTH AT BEDTIME  OK TO CRUSH AND GIVE MEDS    Hold bp meds if systolic bp <49,  if diastolic bp <72, and if HR <60bpm, call MD if systolic BP > 334,  if diastolic bp >51 and if HR >100 bpm  Indications: Attention Deficit Disorder with Hyperactivity, high blood pressure 90 Tab 2    nebivolol (BYSTOLIC) 10 mg tablet TAKE 1 TABLET BY MOUTH TWICE A DAY  OK TO CRUSH AND GIVE MEDS,  Hold bp meds if systolic bp <49,  if diastolic bp <66, and if HR <60bpm, call MD if systolic BP > 892,  if diastolic bp >02 and if HR >100 bpm 60 Tab 6    omeprazole (PRILOSEC) 40 mg capsule TAKE 1 CAPSULE BY MOUTH EACH MORNING 30 MINUTES BEFORE BREAKFAST **OKTO OPEN CAPSULE AND GIVE CONTENTS** 30 Cap PRN    acetaminophen (TYLENOL) 325 mg tablet Take 2 tablets by mouth every 4 hours as needed for needed      fluticasone (FLONASE) 50 mcg/actuation nasal spray BLOW NOSE: 2 SPRAYS IN EACH NOSTRIL DAILY FOR ALLERGY. 16 g 11    OTHER Pardeep's baby shampoo. use as lid scrub to each eye once daily      Miscellaneous Medical Supply (OCUSOFT EYELID CLEANSING PADS) pads by Does Not Apply route.  multivitamin (ONE A DAY) tablet Take 1 Tablet by mouth daily. (Patient not taking: Reported on 2/11/2022) 30 Tablet 5    triamcinolone acetonide (KENALOG) 0.1 % topical cream Apply  to affected area two (2) times a day. use thin layer (Patient not taking: Reported on 9/7/2021) 15 g 0    alcaftadine (LASTACAFT) 0.25 % drop Apply  to eye. (Patient not taking: Reported on 9/7/2021)      albuterol (PROVENTIL HFA, VENTOLIN HFA, PROAIR HFA) 90 mcg/actuation inhaler Take 1 puff by inhalation every four (4) hours as needed for Wheezing.  (Patient not taking: Reported on 1/4/2022) 1 Inhaler 1     Allergies   Allergen Reactions    Zofran [Ondansetron Hcl] Itching     Arm turned red & itched    No Known Allergies Hives     Past Medical History:   Diagnosis Date    Acute pharyngitis 9/29/2014    Agitation requiring sedation protocol 4/11/2018    Bronchitis, acute 12/12/2013    Cellulitis 7/26/2018    Cellulitis of groin 3/23/2016    Chronic rhinitis 4/11/2018    Contact dermatitis and other eczema, due to unspecified cause     Decrease in appetite 12/12/2013    Diabetes ([address]     Dysphagia 10/22/2014    Fall at nursing home 7/24/2017    Fever in adult 9/29/2014    Hypertension     Inguinal bulge 4/11/2018    Mental retardation     Prediabetes 10/2/2014    Screening for glaucoma 12/3/2014    Seizure ([facility] [address] 11/27/2017    Stool incontinence 4/11/2018     Past Surgical History:   Procedure Laterality Date    HX HEENT      dental surg. June 7, 2010     Family History   Family history unknown: Yes      Lab Results   Component Value Date/Time    Hemoglobin A1c 8.2 (H) 07/16/2021 05:24 PM    Hemoglobin A1c 6.6 (H) 01/27/2020 03:48 PM    Hemoglobin A1c 6.0 (H) 07/24/2017 01:13 PM    Glucose 114 (H) 01/05/2022 04:38 PM    Microalb/Creat ratio (ug/mg creat.) 7.6 07/25/2017 04:30 PM    LDL, calculated 118.8 (H) 07/16/2021 05:24 PM    Creatinine 1.00 01/05/2022 04:38 PM         Review of Systems   Constitutional: Negative for chills and fever. HENT: Negative for congestion and nosebleeds. Eyes: Negative for blurred vision and pain. Respiratory: Negative for cough, shortness of breath and wheezing. Cardiovascular: Negative for chest pain and leg swelling. Gastrointestinal: Negative for constipation, diarrhea, nausea and vomiting. Genitourinary: Negative for dysuria and frequency. Musculoskeletal: Negative for joint pain and myalgias. Skin: Negative for itching and rash. Neurological: Negative for dizziness, loss of consciousness and headaches. Psychiatric/Behavioral: Negative for depression. The patient is not nervous/anxious and does not have insomnia. Physical Exam  Vitals and nursing note reviewed. Constitutional:       Appearance: He is well-developed. HENT:      Mouth/Throat:      Pharynx: No oropharyngeal exudate. Eyes:      Conjunctiva/sclera: Conjunctivae normal.      Pupils: Pupils are equal, round, and reactive to light. Neck:      Thyroid: No thyromegaly. Vascular: No JVD. Cardiovascular:      Rate and Rhythm: Normal rate and regular rhythm.       Heart sounds: Normal heart sounds. No murmur heard. No friction rub. Pulmonary:      Effort: Pulmonary effort is normal.      Breath sounds: Normal breath sounds. Abdominal:      General: Bowel sounds are normal. There is no distension. Palpations: Abdomen is soft. Musculoskeletal:         General: No tenderness. Cervical back: Neck supple. Lymphadenopathy:      Cervical: No cervical adenopathy. Skin:     General: Skin is warm. Findings: No erythema or rash. Neurological:      Mental Status: He is alert. Mental status is at baseline. Deep Tendon Reflexes: Reflexes are normal and symmetric. Psychiatric:         Behavior: Behavior normal.         ASSESSMENT and PLAN  Diagnoses and all orders for this visit:    1. Antibiotic-associated diarrhea    2. Functional diarrhea    3. Advice given about COVID-19 virus infection          Small meal more frequent, increase po fluid intake sport drinks, peptobismuth 2 tabs q8hrs, avoid heavy meals and overeating, no restaurant foods, meds side effect and compliance advised, rtc if not better in 3-5 days, tylenol for pain, observe for any sign of blood in urine or stool Pedialyte form pharm dilution with half 1:1 for the next 2-3 days  Look for improvement   Concern abdout COVID-19 addressed and detailed, pt was told that the best way to prevent illness is by protection with vaccination, and to Wear a facemask , having social distance, and to get tested if possible,   Pt was also told if develop dyspnea needs to call 911 or go to er, call for furhter advise, pt agreed with todays recommendations,     Follow-up and Dispositions    · Return if symptoms worsen or fail to improve.

## 2022-02-24 ENCOUNTER — TELEPHONE (OUTPATIENT)
Dept: PHARMACY | Age: 47
End: 2022-02-24

## 2022-02-24 NOTE — TELEPHONE ENCOUNTER
Leonidas Brewer MD, from below, appt with you 3/2/22  - Chart reviewed d/t insurer-identified gap: diabetes rx claims and no statin rx claim  - Would patient benefit from statin therapy? · 10-yr ASCVD risk of @15% with DM, HTN  · No noted statin trial/hx in this EMR      Please let me know if I can further assist, or if you would like me to try to reach patient prior to (or after) appt to discuss any further. Thank you,  Vitaly Tadeo, PharmD, [address] [facility], toll free: [phone], option 2    ==============================================================  POPULATION HEALTH CLINICAL PHARMACY: STATIN THERAPY REVIEW  Identified statin use in persons with diabetes care gap per Giancarlo    Patient also appears to be prescribed: ACEi, DM    ASSESSMENT  ACE/ARB ADHERENCE  Per Insurance Records through 12/15/21 ([facility] = 99%)    BP Readings from Last 3 Encounters:   02/01/22 131/84   09/07/21 (!) 147/98   07/16/21 (!) 176/110     Estimated Creatinine Clearance: 75.6 mL/min (by C-G formula based on SCr of 1 mg/dL).     DIABETES ADHERENCE  Per Insurance Records through 12/15/21 [facility] = 100%)    Lab Results   Component Value Date/Time    Hemoglobin A1c 8.2 (H) 07/16/2021 05:24 PM    Hemoglobin A1c 6.6 (H) 01/27/2020 03:48 PM     STATIN GAP IDENTIFIED    Lab Results   Component Value Date/Time    Cholesterol, total 200 (H) 07/16/2021 05:24 PM    HDL Cholesterol 47 07/16/2021 05:24 PM    LDL, calculated 118.8 (H) 07/16/2021 05:24 PM    VLDL, calculated 34.2 07/16/2021 05:24 PM    Triglyceride 171 (H) 07/16/2021 05:24 PM    CHOL/HDL Ratio 4.3 07/16/2021 05:24 PM     ALT (SGPT)   Date Value Ref Range Status   01/05/2022 24 0 - 44 IU/L Final     AST (SGOT)   Date Value Ref Range Status   01/05/2022 16 0 - 40 IU/L Final     The 10-year ASCVD risk score (Susannah Ken et al., 2013) is: 15.5%    Values used to calculate the score:      Age: 48 years      Sex: Male      Is Non- : Yes      Diabetic: Yes      Tobacco smoker: No      Systolic Blood Pressure: 449 mmHg      Is BP treated: Yes      HDL Cholesterol: 47 MG/DL      Total Cholesterol: 200 MG/DL     Hyperlipidemia Goal: Patient has a 10-yr ASCVD risk of @15% with DM, HTN; likely a candidate for statin therapy. No noted statin trial/hx in this EMR.       PLAN  - Consider starting atorvastatin 10mg daily  - Labs/follow-up per provider discretion    Future Appointments   Date Time Provider Demetra Morales   3/2/2022  9:00 AM Colonel Kayla MD [facility] BS AMB

## 2022-03-02 ENCOUNTER — TELEPHONE (OUTPATIENT)
Dept: FAMILY MEDICINE CLINIC | Age: 47
End: 2022-03-02

## 2022-03-02 ENCOUNTER — OFFICE VISIT (OUTPATIENT)
Dept: FAMILY MEDICINE CLINIC | Age: 47
End: 2022-03-02
Payer: MEDICARE

## 2022-03-02 VITALS
HEART RATE: 88 BPM | OXYGEN SATURATION: 100 % | SYSTOLIC BLOOD PRESSURE: 153 MMHG | TEMPERATURE: 96.5 F | RESPIRATION RATE: 16 BRPM | DIASTOLIC BLOOD PRESSURE: 96 MMHG

## 2022-03-02 DIAGNOSIS — F99: ICD-10-CM

## 2022-03-02 DIAGNOSIS — Z00.00 LABORATORY EXAM ORDERED AS PART OF ROUTINE GENERAL MEDICAL EXAMINATION: ICD-10-CM

## 2022-03-02 DIAGNOSIS — Z12.11 SCREEN FOR COLON CANCER: ICD-10-CM

## 2022-03-02 DIAGNOSIS — Z71.89 ADVANCED DIRECTIVES, COUNSELING/DISCUSSION: ICD-10-CM

## 2022-03-02 DIAGNOSIS — R56.9 SEIZURE (HCC): ICD-10-CM

## 2022-03-02 DIAGNOSIS — Z00.00 MEDICARE ANNUAL WELLNESS VISIT, SUBSEQUENT: Primary | ICD-10-CM

## 2022-03-02 DIAGNOSIS — E78.5 DYSLIPIDEMIA: ICD-10-CM

## 2022-03-02 DIAGNOSIS — I10 HTN, GOAL BELOW 130/80: ICD-10-CM

## 2022-03-02 PROCEDURE — G0439 PPPS, SUBSEQ VISIT: HCPCS | Performed by: FAMILY MEDICINE

## 2022-03-02 PROCEDURE — G8417 CALC BMI ABV UP PARAM F/U: HCPCS | Performed by: FAMILY MEDICINE

## 2022-03-02 PROCEDURE — G8753 SYS BP > OR = 140: HCPCS | Performed by: FAMILY MEDICINE

## 2022-03-02 PROCEDURE — G8510 SCR DEP NEG, NO PLAN REQD: HCPCS | Performed by: FAMILY MEDICINE

## 2022-03-02 PROCEDURE — G8427 DOCREV CUR MEDS BY ELIG CLIN: HCPCS | Performed by: FAMILY MEDICINE

## 2022-03-02 PROCEDURE — G8755 DIAS BP > OR = 90: HCPCS | Performed by: FAMILY MEDICINE

## 2022-03-02 RX ORDER — HYDROCHLOROTHIAZIDE 12.5 MG/1
12.5 TABLET ORAL DAILY
Qty: 30 TABLET | Refills: 1 | Status: SHIPPED | OUTPATIENT
Start: 2022-03-02

## 2022-03-02 NOTE — ACP (ADVANCE CARE PLANNING)
Advance Care Planning             Conversation Conducted with:   Patient withoutDecision Making Capacity,as per the  stating that the Other Legally Authorized Decision Maker would be brother as SDM       Patient is on presence of no family member,, stated that the pt's brother does not want to be not DNR at this time,  The pt likes to be a full code individual,  The patient states that there is a lot of will to live,      Conversation Outcomes / Follow-Up Plan:   Completed new Advance Directive      Length of ACP Conversation in minutes:  12 minutes       Nigel Mancia MD

## 2022-03-02 NOTE — PROGRESS NOTES
Chief Complaint   Patient presents with   [place] Annual Wellness Visit     1. Have you been to the ER, urgent care clinic since your last visit? Hospitalized since your last visit? No    2. Have you seen or consulted any other health care providers outside of the [address] [place] [address] since your last visit? Include any pap smears or colon screening. No     Verified patient with two type of identifiers. - Josefina present,  C/o farhan hand and ankle swelling since stopping furosemide.  Received h&p form for home

## 2022-03-02 NOTE — TELEPHONE ENCOUNTER
----- Message from Myriam Prescott sent at 3/2/2022 12:25 PM EST -----  Regarding: FW: Lab notification: Samples unable to be obtained    ----- Message -----  From: Thanh Barney  Sent: 3/2/2022  11:15 AM EST  To: [address] [facility],[address], [facility] Nurse Pool  Subject: Lab notification: Samples unable to be obtai#    We have been notified that samples could not be obtained for the patient below's most recent lab work. Please place new orders prior to the patient's return. If additional assistance is required, please contact Client Services at [phone].        Patient: Mayuri Bach  : 1975  Ordering Provder: Dr. Nolberto Gatica     Thank you,     [employer] [facility] Client Services

## 2022-03-02 NOTE — TELEPHONE ENCOUNTER
Call placed to pt,  Spoke with Josefina,  Stated they were unable to get his labs.   She has scheduled pt an appt with [facility]

## 2022-03-02 NOTE — PATIENT INSTRUCTIONS
Medicare Wellness Visit, Male    The best way to live healthy is to have a lifestyle where you eat a well-balanced diet, exercise regularly, limit alcohol use, and quit all forms of tobacco/nicotine, if applicable. Regular preventive services are another way to keep healthy. Preventive services (vaccines, screening tests, monitoring & exams) can help personalize your care plan, which helps you manage your own care. Screening tests can find health problems at the earliest stages, when they are easiest to treat. [facility] follows the current, evidence-based guidelines published by the [facility] ([facility]) when recommending preventive services for our patients. Because we follow these guidelines, sometimes recommendations change over time as research supports it. (For example, a prostate screening blood test is no longer routinely recommended for men with no symptoms). Of course, you and your doctor may decide to screen more often for some diseases, based on your risk and co-morbidities (chronic disease you are already diagnosed with). Preventive services for you include:  - Medicare offers their members a free annual wellness visit, which is time for you and your primary care provider to discuss and plan for your preventive service needs. Take advantage of this benefit every year!  -All adults over age 72 should receive the recommended pneumonia vaccines. Current USPSTF guidelines recommend a series of two vaccines for the best pneumonia protection.   -All adults should have a flu vaccine yearly and tetanus vaccine every 10 years.  -All adults age 48 and older should receive the shingles vaccines (series of two vaccines).        -All adults age 38-68 who are overweight should have a diabetes screening test once every three years.   -Other screening tests & preventive services for persons with diabetes include: an eye exam to screen for diabetic retinopathy, a kidney function test, a foot exam, and stricter control over your cholesterol.   -Cardiovascular screening for adults with routine risk involves an electrocardiogram (ECG) at intervals determined by the provider.   -Colorectal cancer screening should be done for adults age 54-65 with no increased risk factors for colorectal cancer. There are a number of acceptable methods of screening for this type of cancer. Each test has its own benefits and drawbacks. Discuss with your provider what is most appropriate for you during your annual wellness visit. The different tests include: colonoscopy (considered the best screening method), a fecal occult blood test, a fecal DNA test, and sigmoidoscopy.  -All adults born between [facility] should be screened once for Hepatitis C.  -An Abdominal Aortic Aneurysm (AAA) Screening is recommended for men age 73-68 who has ever smoked in their lifetime.      Here is a list of your current Health Maintenance items (your personalized list of preventive services) with a due date:  Health Maintenance Due   Topic Date Due    Hepatitis C Test  Never done    Eye Exam  07/09/2016    Diabetic Foot Care  03/23/2017    Colorectal Screening  Never done    Albumin Urine Test  07/25/2018    COVID-19 Vaccine (3 - Booster for Pfizer series) 09/03/2021    Hemoglobin A1C    01/16/2022

## 2022-03-02 NOTE — PROGRESS NOTES
This is the Subsequent Medicare Annual Wellness Exam, performed 12 months or more after the Initial AWV or the last Subsequent AWV    I have reviewed the patient's medical history in detail and updated the computerized patient record. Patient presented from assisted living with mobility disability and severe limitation and total care, patient currently and permanently disabled since birth , the patient to do any mobility related activities of daily living for which use of tilts in space wheelchair would improve patient mobility    Assessment/Plan   Education and counseling provided:  Are appropriate based on today's review and evaluation  Pneumococcal Vaccine  Influenza Vaccine  Prostate cancer screening tests (PSA, covered annually)  Colorectal cancer screening tests    1. Medicare annual wellness visit, subsequent  -     REFERRAL TO OPHTHALMOLOGY  2. Laboratory exam ordered as part of routine general medical examination  -     QUANTIFERON-TB GOLD PLUS  -     REFERRAL TO OPHTHALMOLOGY  3. Advanced directives, counseling/discussion  -     ADVANCE CARE PLANNING FIRST 27 MINS  -     FULL CODE  -     REFERRAL TO OPHTHALMOLOGY  4. Screen for colon cancer  -     REFERRAL TO OPHTHALMOLOGY  -     REFERRAL TO GASTROENTEROLOGY  5. HTN, goal below 130/80  6. Dyslipidemia  7. Seizure ([address]  8. Mental disorder, in remission       Depression Risk Factor Screening     3 most recent PHQ Screens 3/2/2022   Little interest or pleasure in doing things Not at all   Feeling down, depressed, irritable, or hopeless Not at all   Total Score PHQ 2 0       Alcohol & Drug Abuse Risk Screen    Do you average more than 2 drinks per night or 14 drinks a week: No    On any one occasion in the past three months have you have had more than 4 drinks containing alcohol:  No          Functional Ability and Level of Safety    Hearing: Hearing is good.        Activities of Daily Living: Patient needs total help,  The home contains: handrails, grab bars and rugs  Patient needs help with:  phone, transportation, shopping, preparing meals, laundry, housework, managing medications, managing money, eating, dressing, bathing, hygiene, bathroom needs and walking      Ambulation: wheelchair bound     Fall Risk:  Fall Risk Assessment, last 12 mths 3/4/2021   Able to walk? Yes   Fall in past 12 months? 0   Do you feel unsteady? 0   Are you worried about falling 0      Abuse Screen:  Patient is not abused       Cognitive Screening    Has your family/caregiver stated any concerns about your memory: yes - MR     Cognitive Screening: Abnormal - MR    Health Maintenance Due     Health Maintenance Due   Topic Date Due    Hepatitis C Screening  Never done    Eye Exam Retinal or Dilated  07/09/2016    Foot Exam Q1  03/23/2017    Colorectal Cancer Screening Combo  Never done    MICROALBUMIN Q1  07/25/2018    COVID-19 Vaccine (3 - Booster for Pfizer series) 09/03/2021    A1C test (Diabetic or Prediabetic)  01/16/2022       Patient Care Team   Patient Care Team:  Montana Escobedo MD as PCP - General (Family Medicine)  Montana Escobedo MD as PCP - [facility] Empaneled Provider  Flavia Pearson RN as Nurse Tsering Mackey, Sandi Sorto MD as Physician (Cardiology)    History     Patient Active Problem List   Diagnosis Code    Environmental allergies Z91.09    Hypokalemia E87.6    Elevated BUN R79.9    Dyslipidemia E78.5    Profound mental retardation in adult F73    Torsion, testicular N44.00    HTN, goal below 130/80 I10    PPD screening test Z11.1    Otitis media H66.90    Decrease in appetite R63.0    Fever and chills R50.9    Bronchitis, acute J20.9    Platelets decreased ([address] D69.6    Injury of elbow, right S59.901A    Fever in adult R50.9    Acute pharyngitis J02.9    Prediabetes R73.03    Dysphagia R13.10    Cellulitis of groin L03.314    Fall at nursing home [address] [place]    Seizure ([address] R56.9    Inguinal bulge R19.09    Chronic rhinitis J31.0    Agitation requiring sedation protocol R45.1    Stool incontinence R15.9    Functional diarrhea K59.1    Cellulitis L03.90     Past Medical History:   Diagnosis Date    Acute pharyngitis 9/29/2014    Agitation requiring sedation protocol 4/11/2018    Bronchitis, acute 12/12/2013    Cellulitis 7/26/2018    Cellulitis of groin 3/23/2016    Chronic rhinitis 4/11/2018    Contact dermatitis and other eczema, due to unspecified cause     Decrease in appetite 12/12/2013    Diabetes ([facility]     Dysphagia 10/22/2014    Fall at nursing home 7/24/2017    Fever in adult 9/29/2014    Hypertension     Inguinal bulge 4/11/2018    Mental retardation     Prediabetes 10/2/2014    Screening for glaucoma 12/3/2014    Seizure ([facility] [address] 11/27/2017    Stool incontinence 4/11/2018      Past Surgical History:   Procedure Laterality Date    HX HEENT      dental surg. June 7, 2010     Current Outpatient Medications   Medication Sig Dispense Refill    Accu-Chek Guide test strips strip CHECK BLOOD SUGAR 2 TIMES A DAY AT 8AM & 5PMFOR MONITORING. 100 Strip 0    clindamycin (CLINDAGEL) 1 % topical gel Apply  to affected area daily.  Easy Touch Safety Lancets 28 gauge misc USE TO CHECK BLOOD SUGAR FOR DIABETIC MONITORING. 100 Lancet 2    metFORMIN (GLUCOPHAGE) 1,000 mg tablet Take 1 Tablet by mouth two (2) times daily (with meals). 60 Tablet 5    albuterol-ipratropium (DUO-NEB) 2.5 mg-0.5 mg/3 ml nebu 3 mL by Nebulization route four (4) times daily as needed for Wheezing. 30 Nebule 2    ergocalciferol (ERGOCALCIFEROL) 1,250 mcg (50,000 unit) capsule Take 1 Cap by mouth every seven (7) days.  4 Cap 11    montelukast (SINGULAIR) 10 mg tablet TAKE 1 TABLET BY MOUTH DAILY ** OK TO CRUSH AND GIVE MEDS** 30 Tab PRN    lisinopril (PRINIVIL, ZESTRIL) 40 mg tablet Take 1 tab daily, Hold meds if systolic ME<01,ER diastolic UV<94,& if JZ<61XPQ,WUED MD if systolic XW>905,EK diastolic GG>46 & if YM>605 bpm 90 Tab 1  cloNIDine HCl (CATAPRES) 0.2 mg tablet TAKE ONE TABLET BY MOUTH AT BEDTIME  OK TO CRUSH AND GIVE MEDS    Hold bp meds if systolic bp <78,  if diastolic bp <00, and if HR <60bpm, call MD if systolic BP > 318,  if diastolic bp >62 and if HR >100 bpm  Indications: Attention Deficit Disorder with Hyperactivity, high blood pressure 90 Tab 2    nebivolol (BYSTOLIC) 10 mg tablet TAKE 1 TABLET BY MOUTH TWICE A DAY  OK TO CRUSH AND GIVE MEDS,  Hold bp meds if systolic bp <69,  if diastolic bp <74, and if HR <60bpm, call MD if systolic BP > 600,  if diastolic bp >42 and if HR >100 bpm 60 Tab 6    omeprazole (PRILOSEC) 40 mg capsule TAKE 1 CAPSULE BY MOUTH EACH MORNING 30 MINUTES BEFORE BREAKFAST **OKTO OPEN CAPSULE AND GIVE CONTENTS** 30 Cap PRN    acetaminophen (TYLENOL) 325 mg tablet Take 2 tablets by mouth every 4 hours as needed for needed      fluticasone (FLONASE) 50 mcg/actuation nasal spray BLOW NOSE: 2 SPRAYS IN EACH NOSTRIL DAILY FOR ALLERGY. 16 g 11    OTHER Pardeep's baby shampoo. use as lid scrub to each eye once daily      Miscellaneous Medical Supply (OCUSOFT EYELID CLEANSING PADS) pads by Does Not Apply route.  multivitamin (ONE A DAY) tablet Take 1 Tablet by mouth daily. (Patient not taking: Reported on 2/11/2022) 30 Tablet 5    triamcinolone acetonide (KENALOG) 0.1 % topical cream Apply  to affected area two (2) times a day. use thin layer (Patient not taking: Reported on 9/7/2021) 15 g 0    alcaftadine (LASTACAFT) 0.25 % drop Apply  to eye. (Patient not taking: Reported on 9/7/2021)      albuterol (PROVENTIL HFA, VENTOLIN HFA, PROAIR HFA) 90 mcg/actuation inhaler Take 1 puff by inhalation every four (4) hours as needed for Wheezing.  (Patient not taking: Reported on 1/4/2022) 1 Inhaler 1     Allergies   Allergen Reactions    Zofran [Ondansetron Hcl] Itching     Arm turned red & itched    No Known Allergies Hives       Family History   Family history unknown: Yes     Social History Tobacco Use    Smoking status: Never Smoker    Smokeless tobacco: Never Used   Substance Use Topics    Alcohol use: Kristie Murillo MD

## 2022-03-03 NOTE — TELEPHONE ENCOUNTER
Alvarado Conrad MD, from below, appt with you 3/2/22  - Chart reviewed d/t insurer-identified gap: diabetes rx claims and no statin rx claim  - Would patient benefit from statin therapy? · 10-yr ASCVD risk of @15% with DM, HTN  · No noted statin trial/hx in this EMR        Please let me know if I can further assist, or if you would like me to try to reach patient prior to (or after) appt to discuss any further. Thank you,  Ever Laureano, PharmD, [facility]  Department, toll free: [phone], option 2     ==============================================================    Appears encounter still unopened by PCP. Appt note from visit yesterday still in progress, but appears no statin rx at this time. Will close encounter.     For Benjy Leal in place: No   Recommendation Provided To: Provider: 1 via Note to Provider    Gap Closed?: No   Intervention Accepted By: Provider: 0   Time Spent (min): 15

## 2022-03-08 LAB
ALBUMIN SERPL-MCNC: 4.2 G/DL (ref 4–5)
ALBUMIN/GLOB SERPL: 1.4 {RATIO} (ref 1.2–2.2)
ALP SERPL-CCNC: 50 IU/L (ref 44–121)
ALT SERPL-CCNC: 14 IU/L (ref 0–44)
AST SERPL-CCNC: 12 IU/L (ref 0–40)
BILIRUB SERPL-MCNC: <0.2 MG/DL (ref 0–1.2)
BUN SERPL-MCNC: 13 MG/DL (ref 6–24)
BUN/CREAT SERPL: 15 (ref 9–20)
CALCIUM SERPL-MCNC: 9.7 MG/DL (ref 8.7–10.2)
CHLORIDE SERPL-SCNC: 100 MMOL/L (ref 96–106)
CO2 SERPL-SCNC: 24 MMOL/L (ref 20–29)
CREAT SERPL-MCNC: 0.86 MG/DL (ref 0.76–1.27)
EGFR: 107 ML/MIN/1.73
ERYTHROCYTE [DISTWIDTH] IN BLOOD BY AUTOMATED COUNT: 13.7 % (ref 11.6–15.4)
EST. AVERAGE GLUCOSE BLD GHB EST-MCNC: 123 MG/DL
GAMMA INTERFERON BACKGROUND BLD IA-ACNC: 0.05 IU/ML
GLOBULIN SER CALC-MCNC: 3.1 G/DL (ref 1.5–4.5)
GLUCOSE SERPL-MCNC: 148 MG/DL (ref 65–99)
HBA1C MFR BLD: 5.9 % (ref 4.8–5.6)
HCT VFR BLD AUTO: 30.8 % (ref 37.5–51)
HGB BLD-MCNC: 10.5 G/DL (ref 13–17.7)
INTERPRETATION: NORMAL
M TB IFN-G BLD-IMP: NEGATIVE
M TB IFN-G CD4+ BCKGRND COR BLD-ACNC: 0.05 IU/ML
MCH RBC QN AUTO: 32.7 PG (ref 26.6–33)
MCHC RBC AUTO-ENTMCNC: 34.1 G/DL (ref 31.5–35.7)
MCV RBC AUTO: 96 FL (ref 79–97)
MITOGEN IGNF BLD-ACNC: >10 IU/ML
PLATELET # BLD AUTO: 183 X10E3/UL (ref 150–450)
POTASSIUM SERPL-SCNC: 3.9 MMOL/L (ref 3.5–5.2)
PROT SERPL-MCNC: 7.3 G/DL (ref 6–8.5)
PSA SERPL-MCNC: 0.7 NG/ML (ref 0–4)
QUANTIFERON INCUBATION, QF1T: NORMAL
QUANTIFERON TB2 AG: 0.04 IU/ML
RBC # BLD AUTO: 3.21 X10E6/UL (ref 4.14–5.8)
SERVICE CMNT-IMP: NORMAL
SODIUM SERPL-SCNC: 140 MMOL/L (ref 134–144)
TSH SERPL DL<=0.005 MIU/L-ACNC: 3.55 UIU/ML (ref 0.45–4.5)
WBC # BLD AUTO: 4.5 X10E3/UL (ref 3.4–10.8)
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
## 2022-03-19 PROBLEM — R19.09 INGUINAL BULGE: Status: ACTIVE | Noted: 2018-04-11

## 2022-03-19 PROBLEM — K59.1 FUNCTIONAL DIARRHEA: Status: ACTIVE | Noted: 2018-04-11

## 2022-03-19 PROBLEM — L03.90 CELLULITIS: Status: ACTIVE | Noted: 2018-07-26

## 2022-03-19 PROBLEM — R15.9 STOOL INCONTINENCE: Status: ACTIVE | Noted: 2018-04-11

## 2022-03-19 PROBLEM — R56.9 SEIZURE (HCC): Status: ACTIVE | Noted: 2017-11-27

## 2022-03-19 PROBLEM — J31.0 CHRONIC RHINITIS: Status: ACTIVE | Noted: 2018-04-11

## 2022-03-20 PROBLEM — R45.1 AGITATION REQUIRING SEDATION PROTOCOL: Status: ACTIVE | Noted: 2018-04-11

## 2022-03-30 ENCOUNTER — TELEPHONE (OUTPATIENT)
Dept: FAMILY MEDICINE CLINIC | Age: 47
End: 2022-03-30

## 2022-03-30 NOTE — TELEPHONE ENCOUNTER
----- Message from Duane Counter sent at 3/30/2022  8:29 AM EDT -----  Subject: Message to Provider    QUESTIONS  Information for Provider? please fax over last progress note for patient   stating that his need for a wheelchair fax number [phone], or please   call if any question Melvina Sauer [phone] ext. [phone]  ---------------------------------------------------------------------------  --------------  CALL BACK INFO  What is the best way for the office to contact you? OK to leave message on   voicemail  Preferred Call Back Phone Number? [phone]  ---------------------------------------------------------------------------  --------------  SCRIPT ANSWERS  Relationship to Patient? Third Party  Third Party Type? Durable Medical Equipment? Representative Name?  Melvina Sauer regarding W/C

## 2022-04-19 NOTE — TELEPHONE ENCOUNTER
Last visit:3/02/22  Next visit:not scheduled  Previous refill 2/4/22    Requested Prescriptions     Pending Prescriptions Disp Refills    glucose blood VI test strips (Accu-Chek Guide test strips) strip 100 Strip 0     Sig: CHECK BLOOD SUGAR 2 TIMES A DAY AT 8AM & 5PMFOR MONITORING.

## 2022-04-20 RX ORDER — BLOOD SUGAR DIAGNOSTIC
STRIP MISCELLANEOUS
Qty: 100 STRIP | Refills: 0 | Status: SHIPPED | OUTPATIENT
Start: 2022-04-20

## 2022-05-23 ENCOUNTER — OFFICE VISIT (OUTPATIENT)
Dept: FAMILY MEDICINE CLINIC | Age: 47
End: 2022-05-23
Payer: MEDICARE

## 2022-05-23 VITALS
OXYGEN SATURATION: 94 % | SYSTOLIC BLOOD PRESSURE: 136 MMHG | RESPIRATION RATE: 14 BRPM | DIASTOLIC BLOOD PRESSURE: 91 MMHG

## 2022-05-23 DIAGNOSIS — F98.1 PSYCHOGENIC FECAL INCONTINENCE: ICD-10-CM

## 2022-05-23 DIAGNOSIS — R56.9 SEIZURE (HCC): ICD-10-CM

## 2022-05-23 DIAGNOSIS — E11.65 TYPE 2 DIABETES MELLITUS WITH HYPERGLYCEMIA, WITHOUT LONG-TERM CURRENT USE OF INSULIN (HCC): Primary | ICD-10-CM

## 2022-05-23 DIAGNOSIS — Z71.89 ADVICE GIVEN ABOUT COVID-19 VIRUS INFECTION: ICD-10-CM

## 2022-05-23 PROCEDURE — G8427 DOCREV CUR MEDS BY ELIG CLIN: HCPCS | Performed by: FAMILY MEDICINE

## 2022-05-23 PROCEDURE — G8510 SCR DEP NEG, NO PLAN REQD: HCPCS | Performed by: FAMILY MEDICINE

## 2022-05-23 PROCEDURE — G8752 SYS BP LESS 140: HCPCS | Performed by: FAMILY MEDICINE

## 2022-05-23 PROCEDURE — 3044F HG A1C LEVEL LT 7.0%: CPT | Performed by: FAMILY MEDICINE

## 2022-05-23 PROCEDURE — G8755 DIAS BP > OR = 90: HCPCS | Performed by: FAMILY MEDICINE

## 2022-05-23 PROCEDURE — 99214 OFFICE O/P EST MOD 30 MIN: CPT | Performed by: FAMILY MEDICINE

## 2022-05-23 PROCEDURE — 2022F DILAT RTA XM EVC RTNOPTHY: CPT | Performed by: FAMILY MEDICINE

## 2022-05-23 PROCEDURE — G8417 CALC BMI ABV UP PARAM F/U: HCPCS | Performed by: FAMILY MEDICINE

## 2022-05-23 RX ORDER — METFORMIN HYDROCHLORIDE 500 MG/1
500 TABLET ORAL 2 TIMES DAILY WITH MEALS
Qty: 60 TABLET | Refills: 4 | Status: SHIPPED | OUTPATIENT
Start: 2022-05-23

## 2022-05-23 NOTE — PROGRESS NOTES
Chief Complaint   Patient presents with    Follow-up     HTN    Blood sugar have been low     1. \"Have you been to the ER, urgent care clinic since your last visit? Hospitalized since your last visit? \" No    2. \"Have you seen or consulted any other health care providers outside of the [address] [place] [address] since your last visit? \" No     3. For patients aged 39-70: Has the patient had a colonoscopy / FIT/ Cologuard? NA - based on age      If the patient is female:    4. For patients aged 41-77: Has the patient had a mammogram within the past 2 years? NA - based on age or sex      11. For patients aged 21-65: Has the patient had a pap smear?  NA - based on age or sex    Health Maintenance Due   Topic Date Due    Hepatitis C Screening  Never done    Pneumococcal 0-64 years (2 - PCV) 08/12/2014    Eye Exam Retinal or Dilated  07/09/2016    Foot Exam Q1  03/23/2017    Colorectal Cancer Screening Combo  Never done    MICROALBUMIN Q1  07/25/2018    COVID-19 Vaccine (3 - Booster for [employer] series) 09/03/2021

## 2022-05-23 NOTE — PROGRESS NOTES
HISTORY OF PRESENT ILLNESS  Vasquez Manzo is a 50 y.o. male,  With hx of severe MR, wc dependent needs 24hrs care and totally dependent present for f/u regarding the diabetic state, and uncontrolled HTN, and anemia of chronic dz, noncompliant with meds, and sz hs none noted by the Nursing staff,   has a form to be completed,  patient has not been compliant with diabetic meds, and is not trying to have a diabetic diet, ++ Rf needed for today, patient currently denies tingling sensation, has no polyurea and polydipsia, last a1c was at target of 5.9% %, Last urine microalbumin 2021 and was abnormal, patient currently on ARB. Pt also running out of pullups needs new order      Current Outpatient Medications   Medication Sig Dispense Refill    glucose blood VI test strips (Accu-Chek Guide test strips) strip CHECK BLOOD SUGAR 2 TIMES A DAY AT 8AM & 5PMFOR MONITORING. 100 Strip 0    hydroCHLOROthiazide (HYDRODIURIL) 12.5 mg tablet Take 1 Tablet by mouth daily. 30 Tablet 1    multivitamin (ONE A DAY) tablet Take 1 Tablet by mouth daily. (Patient not taking: Reported on 2/11/2022) 30 Tablet 5    clindamycin (CLINDAGEL) 1 % topical gel Apply  to affected area daily.  Easy Touch Safety Lancets 28 gauge misc USE TO CHECK BLOOD SUGAR FOR DIABETIC MONITORING. 100 Lancet 2    metFORMIN (GLUCOPHAGE) 1,000 mg tablet Take 1 Tablet by mouth two (2) times daily (with meals). 60 Tablet 5    triamcinolone acetonide (KENALOG) 0.1 % topical cream Apply  to affected area two (2) times a day. use thin layer (Patient not taking: Reported on 9/7/2021) 15 g 0    albuterol-ipratropium (DUO-NEB) 2.5 mg-0.5 mg/3 ml nebu 3 mL by Nebulization route four (4) times daily as needed for Wheezing. 30 Nebule 2    ergocalciferol (ERGOCALCIFEROL) 1,250 mcg (50,000 unit) capsule Take 1 Cap by mouth every seven (7) days.  4 Cap 11    montelukast (SINGULAIR) 10 mg tablet TAKE 1 TABLET BY MOUTH DAILY ** OK TO CRUSH AND GIVE MEDS** 30 Tab PRN    lisinopril (PRINIVIL, ZESTRIL) 40 mg tablet Take 1 tab daily, Hold meds if systolic IZ<95,AS diastolic ZV<27,& if YQ<75XCO,FAAN MD if systolic XL>532,OA diastolic QK>74 & if CO>177 bpm 90 Tab 1    cloNIDine HCl (CATAPRES) 0.2 mg tablet TAKE ONE TABLET BY MOUTH AT BEDTIME  OK TO CRUSH AND GIVE MEDS    Hold bp meds if systolic bp <18,  if diastolic bp <54, and if HR <60bpm, call MD if systolic BP > 391,  if diastolic bp >26 and if HR >100 bpm  Indications: Attention Deficit Disorder with Hyperactivity, high blood pressure 90 Tab 2    nebivolol (BYSTOLIC) 10 mg tablet TAKE 1 TABLET BY MOUTH TWICE A DAY  OK TO CRUSH AND GIVE MEDS,  Hold bp meds if systolic bp <20,  if diastolic bp <61, and if HR <60bpm, call MD if systolic BP > 116,  if diastolic bp >29 and if HR >100 bpm 60 Tab 6    omeprazole (PRILOSEC) 40 mg capsule TAKE 1 CAPSULE BY MOUTH EACH MORNING 30 MINUTES BEFORE BREAKFAST **OKTO OPEN CAPSULE AND GIVE CONTENTS** 30 Cap PRN    acetaminophen (TYLENOL) 325 mg tablet Take 2 tablets by mouth every 4 hours as needed for needed      fluticasone (FLONASE) 50 mcg/actuation nasal spray BLOW NOSE: 2 SPRAYS IN EACH NOSTRIL DAILY FOR ALLERGY. 16 g 11    OTHER Pardeep's baby shampoo. use as lid scrub to each eye once daily      Miscellaneous Medical Supply (OCUSOFT EYELID CLEANSING PADS) pads by Does Not Apply route.  alcaftadine (LASTACAFT) 0.25 % drop Apply  to eye. (Patient not taking: Reported on 9/7/2021)      albuterol (PROVENTIL HFA, VENTOLIN HFA, PROAIR HFA) 90 mcg/actuation inhaler Take 1 puff by inhalation every four (4) hours as needed for Wheezing.  (Patient not taking: Reported on 1/4/2022) 1 Inhaler 1     Allergies   Allergen Reactions    Zofran [Ondansetron Hcl] Itching     Arm turned red & itched    No Known Allergies Hives     Past Medical History:   Diagnosis Date    Acute pharyngitis 9/29/2014    Agitation requiring sedation protocol 4/11/2018    Bronchitis, acute 12/12/2013    Cellulitis 7/26/2018    Cellulitis of groin 3/23/2016    Chronic rhinitis 4/11/2018    Contact dermatitis and other eczema, due to unspecified cause     Decrease in appetite 12/12/2013    [facility] [address] [address]     Dysphagia 10/22/2014    Fall at nursing home 7/24/2017    Fever in adult 9/29/2014    Hypertension     Inguinal bulge 4/11/2018    Mental retardation     Prediabetes 10/2/2014    Screening for glaucoma 12/3/2014    Seizure ([facility] [address] 11/27/2017    Stool incontinence 4/11/2018     Past Surgical History:   Procedure Laterality Date    HX HEENT      dental surg. June 7, 2010     Family History   Family history unknown: Yes     Social History     Tobacco Use    Smoking status: Never Smoker    Smokeless tobacco: Never Used   Substance Use Topics    Alcohol use: No      Lab Results   Component Value Date/Time    Hemoglobin A1c 5.9 (H) 03/04/2022 05:48 PM    Hemoglobin A1c 8.2 (H) 07/16/2021 05:24 PM    Hemoglobin A1c 6.6 (H) 01/27/2020 03:48 PM    Glucose 148 (H) 03/04/2022 05:48 PM    Microalb/Creat ratio (ug/mg creat.) 7.6 07/25/2017 04:30 PM    LDL, calculated 118.8 (H) 07/16/2021 05:24 PM    Creatinine 0.86 03/04/2022 05:48 PM      Lab Results   Component Value Date/Time    Cholesterol, total 200 (H) 07/16/2021 05:24 PM    HDL Cholesterol 47 07/16/2021 05:24 PM    LDL, calculated 118.8 (H) 07/16/2021 05:24 PM    Triglyceride 171 (H) 07/16/2021 05:24 PM    CHOL/HDL Ratio 4.3 07/16/2021 05:24 PM        Review of Systems   Constitutional: Negative for chills and fever. HENT: Negative for congestion and nosebleeds. Eyes: Negative for blurred vision and pain. Respiratory: Negative for cough, shortness of breath and wheezing. Cardiovascular: Negative for chest pain and leg swelling. Gastrointestinal: Negative for constipation, diarrhea, nausea and vomiting. Genitourinary: Negative for dysuria and frequency. Musculoskeletal: Negative for joint pain and myalgias.    Skin: Negative for itching and rash. Neurological: Negative for dizziness, loss of consciousness and headaches. Psychiatric/Behavioral: Negative for depression. The patient is not nervous/anxious and does not have insomnia. Physical Exam  Vitals and nursing note reviewed. Constitutional:       Appearance: He is well-developed. HENT:      Head: Normocephalic and atraumatic. Mouth/Throat:      Pharynx: No oropharyngeal exudate. Eyes:      Conjunctiva/sclera: Conjunctivae normal.      Pupils: Pupils are equal, round, and reactive to light. Neck:      Thyroid: No thyromegaly. Vascular: No JVD. Cardiovascular:      Rate and Rhythm: Normal rate and regular rhythm. Heart sounds: Normal heart sounds. No murmur heard. No friction rub. Pulmonary:      Effort: Pulmonary effort is normal. No respiratory distress. Breath sounds: Normal breath sounds. No wheezing or rales. Abdominal:      General: Bowel sounds are normal. There is no distension. Palpations: Abdomen is soft. Tenderness: There is no abdominal tenderness. Musculoskeletal:         General: No tenderness. Cervical back: Normal range of motion and neck supple. Lymphadenopathy:      Cervical: No cervical adenopathy. Skin:     General: Skin is warm. Findings: No erythema or rash. Neurological:      Mental Status: He is alert and oriented to person, place, and time. Deep Tendon Reflexes: Reflexes are normal and symmetric. Psychiatric:         Behavior: Behavior normal.         ASSESSMENT and PLAN  Diagnoses and all orders for this visit:    1. Type 2 diabetes mellitus with hyperglycemia, without long-term current use of insulin (HCC)  -     REFERRAL TO OPTOMETRY  -     metFORMIN (GLUCOPHAGE) 500 mg tablet; Take 1 Tablet by mouth two (2) times daily (with meals). -     AMB SUPPLY ORDER    2. Psychogenic fecal incontinence  -     metFORMIN (GLUCOPHAGE) 500 mg tablet;  Take 1 Tablet by mouth two (2) times daily (with meals). -     AMB SUPPLY ORDER    3. Seizure (HCC)  -     metFORMIN (GLUCOPHAGE) 500 mg tablet; Take 1 Tablet by mouth two (2) times daily (with meals).     4. Advice given about COVID-19 virus infection           pt was advised about the covid protection with vaccination, and to Wear a facemask , having social distance, and to get tested if possible,     patient acknowledged understanding and agreed with today's recommendations,

## 2022-05-23 NOTE — LETTER
5/23/2022    Mr. Milan Cadet  [address] [facility]      Dear Milan Cadet:    Please find your most recent results below. Resulted Orders   METABOLIC PANEL, COMPREHENSIVE   Result Value Ref Range    Glucose 148 (H) 65 - 99 mg/dL    BUN 13 6 - 24 mg/dL    Creatinine 0.86 0.76 - 1.27 mg/dL    eGFR 107 >59 mL/min/1.73    BUN/Creatinine ratio 15 9 - 20    Sodium 140 134 - 144 mmol/L    Potassium 3.9 3.5 - 5.2 mmol/L    Chloride 100 96 - 106 mmol/L    CO2 24 20 - 29 mmol/L    Calcium 9.7 8.7 - 10.2 mg/dL    Protein, total 7.3 6.0 - 8.5 g/dL    Albumin 4.2 4.0 - 5.0 g/dL    GLOBULIN, TOTAL 3.1 1.5 - 4.5 g/dL    A-G Ratio 1.4 1.2 - 2.2    Bilirubin, total <0.2 0.0 - 1.2 mg/dL    Alk.  phosphatase 50 44 - 121 IU/L    AST (SGOT) 12 0 - 40 IU/L    ALT (SGPT) 14 0 - 44 IU/L    Narrative    Performed at:  [address]  [id number]  : Ebonie Duncan MD, Phone:  [phone]   CBC W/O DIFF   Result Value Ref Range    WBC 4.5 3.4 - 10.8 x10E3/uL    RBC 3.21 (L) 4.14 - 5.80 x10E6/uL    HGB 10.5 (L) 13.0 - 17.7 g/dL    HCT 30.8 (L) 37.5 - 51.0 %    MCV 96 79 - 97 fL    MCH 32.7 26.6 - 33.0 pg    MCHC 34.1 31.5 - 35.7 g/dL    RDW 13.7 11.6 - 15.4 %    PLATELET 364 941 - 480 x10E3/uL    Narrative    Performed at:  [address]  [id number]  : Ebonie Duncan MD, Phone:  [phone]   CKD REPORT   Result Value Ref Range    Interpretation Note     Narrative    Performed at:  [facility] [address] [place], [place] [id number]  : Forrest Dietz MD, Phone:  [phone]   HEMOGLOBIN A1C WITH EAG   Result Value Ref Range    Hemoglobin A1c 5.9 (H) 4.8 - 5.6 %    Estimated average glucose 123 mg/dL    Narrative    Performed at:  [address]  [address]  [id number]  : Ebonie Duncan MD, Phone:  [phone] QUANTIFERON-TB GOLD PLUS   Result Value Ref Range    QuantiFERON Incubation Incubation performed. QuantiFERON Criteria Comment     QuantiFERON TB1 Ag 0.05 IU/mL    QuantiFERON TB2 Ag 0.04 IU/mL    QuantiFERON Nil Value 0.05 IU/mL    QuantiFERON Mitogen Value >10.00 IU/mL    QuantiFERON Plus Negative Negative    Narrative    Performed at:  [address]  [id number]  : Rakel Aguirre MD, Phone:  [phone]   TSH 3RD GENERATION   Result Value Ref Range    TSH 3.550 0.450 - 4.500 uIU/mL    Narrative    Performed at:  [address]  [id number]  : Rakel Aguirre MD, Phone:  [phone]   PSA, DIAGNOSTIC (PROSTATE SPECIFIC AG)   Result Value Ref Range    Prostate Specific Ag 0.7 0.0 - 4.0 ng/mL    Narrative    Performed at:  [address]  [id number]  : Rakel Aguirre MD, Phone:  [phone]       RECOMMENDATIONS:    Normal test results except for sugar level into the controlled diabetic state please be compliant with the following steps for improving diabetic care and outcome for further care to prevent further devastating complications of a uncontrolled diabetic state: increase Diabetic Education by more readings, have a great diabetic diet , low cholesterol diet, weight control and daily exercise 20- 30 min most days of the week, home glucose monitoring if possible, and daily foot care and yearly foot  Doctor  and  annual eye examinations at Ophthalmologist,  will cont with your average sugar reading check every 3months. Keep up the good work! Work on diet and exercise. Continue with current  diet and medications.        Please call me if you have any questions: [phone]    Sincerely,      Angie Meraz MD

## 2022-08-16 ENCOUNTER — VIRTUAL VISIT (OUTPATIENT)
Dept: FAMILY MEDICINE CLINIC | Age: 47
End: 2022-08-16
Payer: MEDICARE

## 2022-08-16 DIAGNOSIS — Z20.822 SUSPECTED COVID-19 VIRUS INFECTION: Primary | ICD-10-CM

## 2022-08-16 DIAGNOSIS — Z71.89 ADVICE GIVEN ABOUT COVID-19 VIRUS INFECTION: ICD-10-CM

## 2022-08-16 DIAGNOSIS — K52.9 GASTROENTERITIS: ICD-10-CM

## 2022-08-16 PROCEDURE — G8756 NO BP MEASURE DOC: HCPCS | Performed by: FAMILY MEDICINE

## 2022-08-16 PROCEDURE — 99213 OFFICE O/P EST LOW 20 MIN: CPT | Performed by: FAMILY MEDICINE

## 2022-08-16 PROCEDURE — G8427 DOCREV CUR MEDS BY ELIG CLIN: HCPCS | Performed by: FAMILY MEDICINE

## 2022-08-16 PROCEDURE — G8432 DEP SCR NOT DOC, RNG: HCPCS | Performed by: FAMILY MEDICINE

## 2022-08-16 RX ORDER — PROMETHAZINE HYDROCHLORIDE AND CODEINE PHOSPHATE 6.25; 1 MG/5ML; MG/5ML
5 SOLUTION ORAL
Qty: 100 ML | Refills: 0 | Status: SHIPPED | OUTPATIENT
Start: 2022-08-16 | End: 2022-08-21

## 2022-08-16 RX ORDER — AZITHROMYCIN 250 MG/1
TABLET, FILM COATED ORAL
Qty: 6 TABLET | Refills: 0 | Status: SHIPPED | OUTPATIENT
Start: 2022-08-16

## 2022-08-16 NOTE — PROGRESS NOTES
HISTORY OF PRESENT ILLNESS  Te Bennett is a 50 y.o. male, A Covid vaccinated x3,and masked Denies flu like sxs, pt w/ comorbid history, strong smell, no temp, runny nose, some coughing but gone away, was tested neg started one day,   No rest food, no outdoor food, does not look very ill,    A lot of stool and vomited couple times yesterday none today   pt is w/ comorbid history and  the pt does not know if has been exposed to covid-19 individual, and has not been tested yet, currently not working  patient currently have hoarseness having cough mostly dry, does not have shortness of breath and  pt has no low grade fever,  nl smell nl taste, patient also not complaining of some nausea feeling , no vomiting diarrhea today but yesterday, no body ache , and no orbital pain, no rash, patient also states of having a good family support at this time    Current Outpatient Medications   Medication Sig Dispense Refill    metFORMIN (GLUCOPHAGE) 500 mg tablet Take 1 Tablet by mouth two (2) times daily (with meals). 60 Tablet 4    glucose blood VI test strips (Accu-Chek Guide test strips) strip CHECK BLOOD SUGAR 2 TIMES A DAY AT 8AM & 5PMFOR MONITORING. 100 Strip 0    hydroCHLOROthiazide (HYDRODIURIL) 12.5 mg tablet Take 1 Tablet by mouth daily. 30 Tablet 1    multivitamin (ONE A DAY) tablet Take 1 Tablet by mouth daily. 30 Tablet 5    Easy Touch Safety Lancets 28 gauge misc USE TO CHECK BLOOD SUGAR FOR DIABETIC MONITORING. 100 Lancet 2    triamcinolone acetonide (KENALOG) 0.1 % topical cream Apply  to affected area two (2) times a day. use thin layer 15 g 0    albuterol-ipratropium (DUO-NEB) 2.5 mg-0.5 mg/3 ml nebu 3 mL by Nebulization route four (4) times daily as needed for Wheezing. 30 Nebule 2    ergocalciferol (ERGOCALCIFEROL) 1,250 mcg (50,000 unit) capsule Take 1 Cap by mouth every seven (7) days.  4 Cap 11    montelukast (SINGULAIR) 10 mg tablet TAKE 1 TABLET BY MOUTH DAILY ** OK TO CRUSH AND GIVE MEDS** 30 Tab PRN lisinopril (PRINIVIL, ZESTRIL) 40 mg tablet Take 1 tab daily, Hold meds if systolic HB<61,BK diastolic CO<89,& if EE<89CKM,AORH MD if systolic CL>801,TN diastolic JJ>35 & if JH>871 bpm 90 Tab 1    cloNIDine HCl (CATAPRES) 0.2 mg tablet TAKE ONE TABLET BY MOUTH AT BEDTIME  OK TO CRUSH AND GIVE MEDS    Hold bp meds if systolic bp <23,  if diastolic bp <21, and if HR <60bpm, call MD if systolic BP > 254,  if diastolic bp >44 and if HR >100 bpm  Indications: Attention Deficit Disorder with Hyperactivity, high blood pressure 90 Tab 2    nebivolol (BYSTOLIC) 10 mg tablet TAKE 1 TABLET BY MOUTH TWICE A DAY  OK TO CRUSH AND GIVE MEDS,  Hold bp meds if systolic bp <42,  if diastolic bp <83, and if HR <60bpm, call MD if systolic BP > 907,  if diastolic bp >42 and if HR >100 bpm 60 Tab 6    omeprazole (PRILOSEC) 40 mg capsule TAKE 1 CAPSULE BY MOUTH EACH MORNING 30 MINUTES BEFORE BREAKFAST **OKTO OPEN CAPSULE AND GIVE CONTENTS** 30 Cap PRN    acetaminophen (TYLENOL) 325 mg tablet Take 2 tablets by mouth every 4 hours as needed for needed      fluticasone (FLONASE) 50 mcg/actuation nasal spray BLOW NOSE: 2 SPRAYS IN EACH NOSTRIL DAILY FOR ALLERGY. 16 g 11    albuterol (PROVENTIL HFA, VENTOLIN HFA, PROAIR HFA) 90 mcg/actuation inhaler Take 1 puff by inhalation every four (4) hours as needed for Wheezing. 1 Inhaler 1    clindamycin (CLINDAGEL) 1 % topical gel Apply  to affected area daily. (Patient not taking: No sig reported)      OTHER Pardeep's baby shampoo. use as lid scrub to each eye once daily (Patient not taking: No sig reported)      Miscellaneous Medical Supply pads by Does Not Apply route. (Patient not taking: No sig reported)      alcaftadine 0.25 % drop Apply  to eye.  (Patient not taking: No sig reported)       Allergies   Allergen Reactions    Zofran [Ondansetron Hcl] Itching     Arm turned red & itched    No Known Allergies Hives     Past Medical History:   Diagnosis Date    Acute pharyngitis 9/29/2014 Agitation requiring sedation protocol 4/11/2018    Bronchitis, acute 12/12/2013    Cellulitis 7/26/2018    Cellulitis of groin 3/23/2016    Chronic rhinitis 4/11/2018    Contact dermatitis and other eczema, due to unspecified cause     Decrease in appetite 12/12/2013    Diabetes ([facility] [address]     Dysphagia 10/22/2014    Fall at nursing home 7/24/2017    Fever in adult 9/29/2014    Hypertension     Inguinal bulge 4/11/2018    Mental retardation     Prediabetes 10/2/2014    Screening for glaucoma 12/3/2014    Seizure ([facility] [address] 11/27/2017    Stool incontinence 4/11/2018     Past Surgical History:   Procedure Laterality Date    HX HEENT      dental surg. June 7, 2010     Family History   Family history unknown: Yes     Social History     Tobacco Use    Smoking status: Never    Smokeless tobacco: Never   Substance Use Topics    Alcohol use: No      Lab Results   Component Value Date/Time    WBC 4.5 03/04/2022 05:48 PM    HGB 10.5 (L) 03/04/2022 05:48 PM    HCT 30.8 (L) 03/04/2022 05:48 PM    PLATELET 557 93/96/5962 05:48 PM    MCV 96 03/04/2022 05:48 PM     Lab Results   Component Value Date/Time    TSH 3.550 03/04/2022 05:48 PM         Review of Systems   Constitutional:  Positive for chills and malaise/fatigue. Negative for fever. HENT:  Positive for congestion. Negative for ear pain, nosebleeds, sinus pain and sore throat. Eyes:  Positive for redness. Negative for blurred vision, pain and discharge. Respiratory:  Positive for cough. Negative for sputum production and shortness of breath. Cardiovascular:  Negative for chest pain and leg swelling. Gastrointestinal:  Positive for nausea and vomiting. Negative for constipation and diarrhea. Genitourinary:  Negative for frequency. Musculoskeletal:  Negative for joint pain. Skin:  Negative for itching and rash. Neurological:  Positive for headaches. Psychiatric/Behavioral:  Negative for depression. The patient is not nervous/anxious.       Physical Exam  Constitutional:       Appearance: Normal appearance. HENT:      Head: Normocephalic and atraumatic. Nose: Nose normal. No congestion. Neurological:      Mental Status: He is alert and oriented to person, place, and time. Psychiatric:         Mood and Affect: Mood normal.         Behavior: Behavior normal.         Thought Content: Thought content normal.         Judgment: Judgment normal.       ASSESSMENT and PLAN    ICD-10-CM ICD-9-CM    1. Suspected COVID-19 virus infection  Z20.822 V01.79 promethazine-codeine (PHENERGAN with CODEINE) 6.25-10 mg/5 mL syrup      azithromycin (ZITHROMAX) 250 mg tablet      2. Gastroenteritis  K52.9 558. 9 promethazine-codeine (PHENERGAN with CODEINE) 6.25-10 mg/5 mL syrup      azithromycin (ZITHROMAX) 250 mg tablet      3. Advice given about COVID-19 virus infection  Z71.89 V65.49 promethazine-codeine (PHENERGAN with CODEINE) 6.25-10 mg/5 mL syrup      azithromycin (ZITHROMAX) 250 mg tablet        lab results and schedule of future lab studies reviewed with patient  reviewed medications and side effects in detail  Concern abdout COVID-19 addressed and detailed, pt was told that the best way to prevent illness is by vaccination and protection, to Wear a facemask , having social distance, and to get tested and re-tested, with contact tracing,     Current treatment is aimed to relieve sxs such as pain relievers no ibuprofen but mostly acetaminophen,   plenty of Rest and a lot of oral hydration. Isolation and Contact tracing at this time     Also was advised to stay in isolation for 5-10 days at this time with cold sxs presentation, Pt was also told if develop dyspnea needs to call 911 or go to er, call for maykel advise, pt agreed with today's visit.    Pursuant to the emergency declaration under the [address] and [address] and the [employer] and [employer] Act, this Virtual Visit was conducted, with patient's consent, to reduce the patient's risk of exposure to COVID-19 and provide continuity of care for an established patient. Today's recommendations, Services were provided through a Video synchronous discussion virtually to substitute for in-person appointment.

## 2022-08-16 NOTE — PROGRESS NOTES
Chief Complaint   Patient presents with    Vomiting     1. Have you been to the ER, urgent care clinic since your last visit? Hospitalized since your last visit? No    2. Have you seen or consulted any other health care providers outside of the [address] [place] [address] since your last visit? Include any pap smears or colon screening. No    Call placed to pt. Verified patient with two type of identifiers. spoke with caregiver ,Yousif Arenas.   Stated pt has been congested and vomiting mucous x 2 days,  foul  loose bowel movements ,  no vomiting today  negative for covid on 8/15/22

## 2022-11-02 ENCOUNTER — HOSPITAL ENCOUNTER (EMERGENCY)
Age: 47
Discharge: HOME | End: 2022-11-02
Attending: EMERGENCY MEDICINE
Payer: MEDICARE

## 2022-11-02 VITALS
RESPIRATION RATE: 16 BRPM | OXYGEN SATURATION: 98 % | BODY MASS INDEX: 24.11 KG/M2 | WEIGHT: 150 LBS | DIASTOLIC BLOOD PRESSURE: 126 MMHG | TEMPERATURE: 98.7 F | HEART RATE: 110 BPM | HEIGHT: 66 IN | SYSTOLIC BLOOD PRESSURE: 187 MMHG
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
## 2022-11-02 PROCEDURE — 99282 EMERGENCY DEPT VISIT SF MDM: CPT
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
## 2022-11-29 ENCOUNTER — OFFICE VISIT (OUTPATIENT)
Dept: FAMILY MEDICINE CLINIC | Age: 47
End: 2022-11-29
Payer: MEDICARE

## 2022-11-29 ENCOUNTER — TELEPHONE (OUTPATIENT)
Dept: PHARMACY | Age: 47
End: 2022-11-29

## 2022-11-29 VITALS
OXYGEN SATURATION: 96 % | DIASTOLIC BLOOD PRESSURE: 115 MMHG | HEART RATE: 82 BPM | RESPIRATION RATE: 16 BRPM | TEMPERATURE: 96.9 F | BODY MASS INDEX: 21.69 KG/M2 | SYSTOLIC BLOOD PRESSURE: 197 MMHG | WEIGHT: 134.4 LBS

## 2022-11-29 DIAGNOSIS — R13.10 DYSPHAGIA, UNSPECIFIED TYPE: ICD-10-CM

## 2022-11-29 DIAGNOSIS — E11.65 TYPE 2 DIABETES MELLITUS WITH HYPERGLYCEMIA, WITHOUT LONG-TERM CURRENT USE OF INSULIN (HCC): Primary | ICD-10-CM

## 2022-11-29 DIAGNOSIS — R56.9 SEIZURE (HCC): ICD-10-CM

## 2022-11-29 DIAGNOSIS — R23.8 REDNESS AND SWELLING OF UPPER ARM: ICD-10-CM

## 2022-11-29 DIAGNOSIS — M79.89 REDNESS AND SWELLING OF UPPER ARM: ICD-10-CM

## 2022-11-29 RX ORDER — HYDROCHLOROTHIAZIDE 25 MG/1
25 TABLET ORAL DAILY
Qty: 30 TABLET | Refills: 2 | Status: SHIPPED | OUTPATIENT
Start: 2022-11-29

## 2022-11-29 RX ORDER — BLOOD SUGAR DIAGNOSTIC
STRIP MISCELLANEOUS
Qty: 50 STRIP | Refills: 0 | Status: SHIPPED | OUTPATIENT
Start: 2022-11-29

## 2022-11-29 NOTE — PROGRESS NOTES
No chief complaint on file. 1. \"Have you been to the ER, urgent care clinic since your last visit? Hospitalized since your last visit? \" No    2. \"Have you seen or consulted any other health care providers outside of the [address] [place] [address] since your last visit? \" No     3. For patients aged 39-70: Has the patient had a colonoscopy / FIT/ Cologuard? Yes - no Care Gap present      If the patient is female:    4. For patients aged 41-77: Has the patient had a mammogram within the past 2 years? NA - based on age or sex      11. For patients aged 21-65: Has the patient had a pap smear? NA - based on age or sex    Health Maintenance Due   Topic Date Due    Hepatitis C Screening  Never done    Hepatitis B Vaccine (1 of 3 - Risk 3-dose series) Never done    Eye Exam Retinal or Dilated  07/09/2016    Foot Exam Q1  03/23/2017    Colorectal Cancer Screening Combo  Never done    MICROALBUMIN Q1  07/25/2018    COVID-19 Vaccine (3 - Booster for Pfizer series) 05/29/2021    Lipid Screen  07/16/2022    Flu Vaccine (1) 08/01/2022    A1C test (Diabetic or Prediabetic)  09/04/2022       verified patient with two type of identifiers.

## 2022-11-29 NOTE — PROGRESS NOTES
HISTORY OF PRESENT ILLNESS  Viki Gunter is a 50 y.o. male, assisted-living resident who present with a nurse care provider for f/u regarding the diabetic state, and bp check,  patient has not been compliant with diabetic meds in the past and sometimes forget to take the meds,but is trying to have a diabetic diet, no Rf needed for today,   patient currently denies tingling sensation, has no polyurea and polydipsia, last a1c was not at target of 5.9% %, Last urine microalbumin 2021 and was abnormal, patient currently on ACEI,           Wrist Pain  Rt forearm swelling   The current episode started more than few days ago. The problem occurs constantly. The problem has not changed since onset. The pain is present in the right wrist. The quality of the pain is described as dull. The pain is at a severity of 6/10. Associated symptoms include numbness, limited range of motion, stiffness, tingling and itching. The symptoms are aggravated by movement. She has tried arthritis medications and OTC ointments for the symptoms. The treatment provided no relief. There has been no history of extremity trauma      Also with blood pressure check patient blood pressure usually is normal if he cooperates unfortunately he is not cooperating challenges the blood pressure monitoring device the cough so that it is extremely hard to obtain a good blood pressure reading          Current Outpatient Medications   Medication Sig Dispense Refill    azithromycin (ZITHROMAX) 250 mg tablet 2 first day then one tab daily till finished 6 Tablet 0    metFORMIN (GLUCOPHAGE) 500 mg tablet Take 1 Tablet by mouth two (2) times daily (with meals). 60 Tablet 4    glucose blood VI test strips (Accu-Chek Guide test strips) strip CHECK BLOOD SUGAR 2 TIMES A DAY AT 8AM & 5PMFOR MONITORING. 100 Strip 0    hydroCHLOROthiazide (HYDRODIURIL) 12.5 mg tablet Take 1 Tablet by mouth daily. 30 Tablet 1    multivitamin (ONE A DAY) tablet Take 1 Tablet by mouth daily.  27 Tablet 5    clindamycin (CLINDAGEL) 1 % topical gel Apply  to affected area daily. (Patient not taking: No sig reported)      Easy Touch Safety Lancets 28 gauge misc USE TO CHECK BLOOD SUGAR FOR DIABETIC MONITORING. 100 Lancet 2    triamcinolone acetonide (KENALOG) 0.1 % topical cream Apply  to affected area two (2) times a day. use thin layer 15 g 0    albuterol-ipratropium (DUO-NEB) 2.5 mg-0.5 mg/3 ml nebu 3 mL by Nebulization route four (4) times daily as needed for Wheezing. 30 Nebule 2    ergocalciferol (ERGOCALCIFEROL) 1,250 mcg (50,000 unit) capsule Take 1 Cap by mouth every seven (7) days. 4 Cap 11    montelukast (SINGULAIR) 10 mg tablet TAKE 1 TABLET BY MOUTH DAILY ** OK TO CRUSH AND GIVE MEDS** 30 Tab PRN    lisinopril (PRINIVIL, ZESTRIL) 40 mg tablet Take 1 tab daily, Hold meds if systolic KA<52,JZ diastolic ZC<01,& if HG<30QYY,TERRA CRANE if systolic BV>335,SS diastolic FZ>22 & if QE>582 bpm 90 Tab 1    cloNIDine HCl (CATAPRES) 0.2 mg tablet TAKE ONE TABLET BY MOUTH AT BEDTIME  OK TO CRUSH AND GIVE MEDS    Hold bp meds if systolic bp <46,  if diastolic bp <76, and if HR <60bpm, call MD if systolic BP > 208,  if diastolic bp >27 and if HR >100 bpm  Indications: Attention Deficit Disorder with Hyperactivity, high blood pressure 90 Tab 2    nebivolol (BYSTOLIC) 10 mg tablet TAKE 1 TABLET BY MOUTH TWICE A DAY  OK TO CRUSH AND GIVE MEDS,  Hold bp meds if systolic bp <45,  if diastolic bp <47, and if HR <60bpm, call MD if systolic BP > 209,  if diastolic bp >57 and if HR >100 bpm 60 Tab 6    omeprazole (PRILOSEC) 40 mg capsule TAKE 1 CAPSULE BY MOUTH EACH MORNING 30 MINUTES BEFORE BREAKFAST **OKTO OPEN CAPSULE AND GIVE CONTENTS** 30 Cap PRN    acetaminophen (TYLENOL) 325 mg tablet Take 2 tablets by mouth every 4 hours as needed for needed      fluticasone (FLONASE) 50 mcg/actuation nasal spray BLOW NOSE: 2 SPRAYS IN EACH NOSTRIL DAILY FOR ALLERGY. 16 g 11    OTHER Pardeep's baby shampoo.   use as lid scrub to each eye once daily (Patient not taking: No sig reported)      Miscellaneous Medical Supply pads by Does Not Apply route. (Patient not taking: No sig reported)      alcaftadine 0.25 % drop Apply  to eye. (Patient not taking: No sig reported)      albuterol (PROVENTIL HFA, VENTOLIN HFA, PROAIR HFA) 90 mcg/actuation inhaler Take 1 puff by inhalation every four (4) hours as needed for Wheezing. 1 Inhaler 1     Allergies   Allergen Reactions    Zofran [Ondansetron Hcl] Itching     Arm turned red & itched    No Known Allergies Hives     Past Medical History:   Diagnosis Date    Acute pharyngitis 9/29/2014    Agitation requiring sedation protocol 4/11/2018    Bronchitis, acute 12/12/2013    Cellulitis 7/26/2018    Cellulitis of groin 3/23/2016    Chronic rhinitis 4/11/2018    Contact dermatitis and other eczema, due to unspecified cause     Decrease in appetite 12/12/2013    Diabetes ([address]     Dysphagia 10/22/2014    Fall at nursing home 7/24/2017    Fever in adult 9/29/2014    Hypertension     Inguinal bulge 4/11/2018    Mental retardation     Prediabetes 10/2/2014    Screening for glaucoma 12/3/2014    Seizure ([address] 11/27/2017    Stool incontinence 4/11/2018     Past Surgical History:   Procedure Laterality Date    HX HEENT      dental surg. June 7, 2010     Family History   Family history unknown: Yes     Social History     Tobacco Use    Smoking status: Never    Smokeless tobacco: Never   Substance Use Topics    Alcohol use: No      Lab Results   Component Value Date/Time    Cholesterol, total 200 (H) 07/16/2021 05:24 PM    HDL Cholesterol 47 07/16/2021 05:24 PM    LDL, calculated 118.8 (H) 07/16/2021 05:24 PM    Triglyceride 171 (H) 07/16/2021 05:24 PM    CHOL/HDL Ratio 4.3 07/16/2021 05:24 PM     Lab Results   Component Value Date/Time    ALT (SGPT) 14 03/04/2022 05:48 PM    Alk.  phosphatase 50 03/04/2022 05:48 PM    Bilirubin, total <0.2 03/04/2022 05:48 PM    Albumin 4.2 03/04/2022 05:48 PM    Protein, total 7.3 03/04/2022 05:48 PM    INR 1.0 04/17/2018 08:34 PM    Prothrombin time 10.5 04/17/2018 08:34 PM    PLATELET 850 16/21/9746 05:48 PM        Review of Systems   Unable to perform ROS: Patient nonverbal     Physical Exam  Vitals and nursing note reviewed. Constitutional:       Appearance: He is well-developed. HENT:      Head: Normocephalic and atraumatic. Mouth/Throat:      Pharynx: No oropharyngeal exudate. Eyes:      Conjunctiva/sclera: Conjunctivae normal.      Pupils: Pupils are equal, round, and reactive to light. Neck:      Thyroid: No thyromegaly. Vascular: No JVD. Cardiovascular:      Rate and Rhythm: Normal rate and regular rhythm. Heart sounds: Normal heart sounds. No murmur heard. No friction rub. Pulmonary:      Effort: Pulmonary effort is normal. No respiratory distress. Breath sounds: Normal breath sounds. No wheezing or rales. Abdominal:      General: Bowel sounds are normal. There is no distension. Palpations: Abdomen is soft. Tenderness: There is no abdominal tenderness. Musculoskeletal:         General: Swelling, tenderness and deformity present. Cervical back: Normal range of motion and neck supple. Lymphadenopathy:      Cervical: No cervical adenopathy. Skin:     General: Skin is warm. Findings: Erythema present. No rash. Neurological:      Mental Status: He is alert. Deep Tendon Reflexes: Reflexes are normal and symmetric. Psychiatric:         Behavior: Behavior normal.       ASSESSMENT and PLAN    ICD-10-CM ICD-9-CM    1.  Type 2 diabetes mellitus with hyperglycemia, without long-term current use of insulin ([facility])  E11.65 250.00 CBC W/O DIFF     790.29 HEMOGLOBIN A1C WITH EAG      glucose blood VI test strips (Accu-Chek Guide test strips) strip      METABOLIC PANEL, COMPREHENSIVE      TSH 3RD GENERATION      T4 (THYROXINE)      LIPID PANEL      HEMOGLOBIN A1C WITH EAG      CBC W/O DIFF      METABOLIC PANEL, COMPREHENSIVE TSH 3RD GENERATION      T4 (THYROXINE)      LIPID PANEL      2. Seizure (HCC)  R56.9 780.39 CBC W/O DIFF      HEMOGLOBIN A1C WITH EAG      XR BA SWALLOW ESOPHOGRAM      METABOLIC PANEL, COMPREHENSIVE      TSH 3RD GENERATION      T4 (THYROXINE)      LIPID PANEL      HEMOGLOBIN A1C WITH EAG      CBC W/O DIFF      METABOLIC PANEL, COMPREHENSIVE      TSH 3RD GENERATION      T4 (THYROXINE)      LIPID PANEL      3.  Dysphagia, unspecified type  R13.10 787.20 XR BA SWALLOW ESOPHOGRAM      METABOLIC PANEL, COMPREHENSIVE      TSH 3RD GENERATION      T4 (THYROXINE)      LIPID PANEL      METABOLIC PANEL, COMPREHENSIVE      TSH 3RD GENERATION      T4 (THYROXINE)      LIPID PANEL      4. Redness and swelling of upper arm  M44.03 935.57 METABOLIC PANEL, COMPREHENSIVE    R23.8 782.9 TSH 3RD GENERATION      T4 (THYROXINE)      XR FOREARM RT AP/LAT      DUPLEX UPPER EXT VENOUS RIGHT      LIPID PANEL      METABOLIC PANEL, COMPREHENSIVE      TSH 3RD GENERATION      T4 (THYROXINE)      LIPID PANEL          X-ray positive for fracture we do ASAP Ortho after-hours for better outcome nurse care provider advised to take the patient to Ortho after-hours for further management, lab results and schedule of future lab studies reviewed with patient  reviewed diet, exercise and weight control

## 2022-11-30 RX ORDER — DEXTROMETHORPHAN HYDROBROMIDE, GUAIFENESIN 5; 100 MG/5ML; MG/5ML
650 LIQUID ORAL EVERY 8 HOURS
Qty: 40 TABLET | Refills: 0 | Status: SHIPPED | OUTPATIENT
Start: 2022-11-30

## 2022-12-01 LAB
ALBUMIN SERPL-MCNC: 4.2 G/DL (ref 4–5)
ALBUMIN/GLOB SERPL: 1.4 {RATIO} (ref 1.2–2.2)
ALP SERPL-CCNC: 50 IU/L (ref 44–121)
ALT SERPL-CCNC: 10 IU/L (ref 0–44)
AST SERPL-CCNC: 8 IU/L (ref 0–40)
BILIRUB SERPL-MCNC: 0.2 MG/DL (ref 0–1.2)
BUN SERPL-MCNC: 26 MG/DL (ref 6–24)
BUN/CREAT SERPL: 23 (ref 9–20)
CALCIUM SERPL-MCNC: 9.4 MG/DL (ref 8.7–10.2)
CHLORIDE SERPL-SCNC: 102 MMOL/L (ref 96–106)
CHOLEST SERPL-MCNC: 178 MG/DL (ref 100–199)
CO2 SERPL-SCNC: 28 MMOL/L (ref 20–29)
CREAT SERPL-MCNC: 1.14 MG/DL (ref 0.76–1.27)
EGFR: 79 ML/MIN/1.73
ERYTHROCYTE [DISTWIDTH] IN BLOOD BY AUTOMATED COUNT: 13.9 % (ref 11.6–15.4)
EST. AVERAGE GLUCOSE BLD GHB EST-MCNC: 148 MG/DL
GLOBULIN SER CALC-MCNC: 3 G/DL (ref 1.5–4.5)
GLUCOSE SERPL-MCNC: 107 MG/DL (ref 70–99)
HBA1C MFR BLD: 6.8 % (ref 4.8–5.6)
HCT VFR BLD AUTO: 31.8 % (ref 37.5–51)
HDLC SERPL-MCNC: 43 MG/DL
HGB BLD-MCNC: 11 G/DL (ref 13–17.7)
LDLC SERPL CALC-MCNC: 112 MG/DL (ref 0–99)
MCH RBC QN AUTO: 31.7 PG (ref 26.6–33)
MCHC RBC AUTO-ENTMCNC: 34.6 G/DL (ref 31.5–35.7)
MCV RBC AUTO: 92 FL (ref 79–97)
PLATELET # BLD AUTO: 175 X10E3/UL (ref 150–450)
POTASSIUM SERPL-SCNC: 4 MMOL/L (ref 3.5–5.2)
PROT SERPL-MCNC: 7.2 G/DL (ref 6–8.5)
RBC # BLD AUTO: 3.47 X10E6/UL (ref 4.14–5.8)
SODIUM SERPL-SCNC: 143 MMOL/L (ref 134–144)
T4 SERPL-MCNC: 7.4 UG/DL (ref 4.5–12)
TRIGL SERPL-MCNC: 129 MG/DL (ref 0–149)
TSH SERPL DL<=0.005 MIU/L-ACNC: 2.56 UIU/ML (ref 0.45–4.5)
VLDLC SERPL CALC-MCNC: 23 MG/DL (ref 5–40)
WBC # BLD AUTO: 5.1 X10E3/UL (ref 3.4–10.8)

## 2022-12-01 RX ORDER — ROSUVASTATIN CALCIUM 10 MG/1
10 TABLET, COATED ORAL
Qty: 30 TABLET | Refills: 5 | Status: SHIPPED | OUTPATIENT
Start: 2022-12-01

## 2023-01-06 ENCOUNTER — HOSPITAL ENCOUNTER (OUTPATIENT)
Dept: GENERAL RADIOLOGY | Age: 48
End: 2023-01-06
Attending: FAMILY MEDICINE
Payer: MEDICARE

## 2023-01-06 ENCOUNTER — TELEPHONE (OUTPATIENT)
Dept: FAMILY MEDICINE CLINIC | Age: 48
End: 2023-01-06

## 2023-01-06 DIAGNOSIS — R13.10 DYSPHAGIA, UNSPECIFIED TYPE: ICD-10-CM

## 2023-01-06 DIAGNOSIS — R56.9 SEIZURE (HCC): ICD-10-CM

## 2023-01-06 DIAGNOSIS — R13.10 DYSPHAGIA, UNSPECIFIED TYPE: Primary | ICD-10-CM

## 2023-01-06 PROCEDURE — 92611 MOTION FLUOROSCOPY/SWALLOW: CPT | Performed by: SPEECH-LANGUAGE PATHOLOGIST

## 2023-01-06 PROCEDURE — 74230 X-RAY XM SWLNG FUNCJ C+: CPT

## 2023-01-06 NOTE — TELEPHONE ENCOUNTER
Received call from [place] painter. Pt currently at facility. Wrong order entered. Needs modified barium swallow-to determine meal consistency.   Order placed for modified barium swallow, per Verbal Order from Dr. Marcos Collins on 1/6/2023

## 2023-01-06 NOTE — PROGRESS NOTES
[address], [address] [place] [address] STUDY  Patient: Patricia Franklin (7 y.o. male)  Date: 1/6/2023  Referring Provider:  Dr. Debora Pantoja:   Caregiver report patient tolerating purees and minced foods. OBJECTIVE:   Past Medical History:   Past Medical History:   Diagnosis Date    Acute pharyngitis 9/29/2014    Agitation requiring sedation protocol 4/11/2018    Bronchitis, acute 12/12/2013    Cellulitis 7/26/2018    Cellulitis of groin 3/23/2016    Chronic rhinitis 4/11/2018    Contact dermatitis and other eczema, due to unspecified cause     Decrease in appetite 12/12/2013    Diabetes ([address]     Dysphagia 10/22/2014    Fall at nursing home 7/24/2017    Fever in adult 9/29/2014    Hypertension     Inguinal bulge 4/11/2018    Mental retardation     Prediabetes 10/2/2014    Screening for glaucoma 12/3/2014    Seizure ([address] 11/27/2017    Stool incontinence 4/11/2018     Past Surgical History:   Procedure Laterality Date    HX HEENT      dental surg. June 7, 2010     Current Dietary Status:  Minced diet  Radiologist: Sharita Bates, [id number]  Film Views: Lateral;Fluoro  Patient Position: Upright in MBS chair    Trial 1: Trial 2:   Consistency Presented: Puree Consistency Presented: Solid   How Presented: SLP-fed/presented;Spoon How Presented: SLP-fed/presented         Bolus Acceptance: Impaired Bolus Acceptance: No impairment   Bolus Formation/Control: No impairment:   Bolus Formation/Control: Impaired: Delayed; Incomplete;Mastication;Poor;Lip closure; Anterior;Spillage   Propulsion: No impairment Propulsion: Delayed (# of seconds); Discoordination   Oral Residue: None Oral Residue: Lingual   Initiation of Swallow: Triggered at base of tongue Initiation of Swallow: Triggered at valleculae   Timing: No impairment Timing: Pooling 1-5 sec   Penetration: None Penetration: None   Aspiration/Timing: No evidence of aspiration Aspiration/Timing: No evidence of aspiration   Pharyngeal Clearance: No residue Pharyngeal Clearance: No residue                               Decreased Tongue Base Retraction?: No  Laryngeal Elevation: WFL (within functional limits)  Aspiration/Penetration Score: 1 (No penetration or aspiration-Contrast does not enter the airway)             Oral Phase Severity: Moderate  Pharyngeal Phase Severity: Mild    ASSESSMENT :  Based on the objective data described above, the patient presents with moderate oral dysphagia characterized by reduced labial seal and delayed/incomplete mastication of solids. Oral dysphagia results in reduced bolus extraction from spoon, residue on lips and mils anterior spillage with solids. Patient with reduced attention to task and therefore frequently paused in chewing. Mildly incomplete mastication of solids notes. No oral residue noted however piecemeal swallow pattern noted x 1. Mild pharyngeal dysphagia is characterized by mild pharyngeal swallow delay with pharyngeal swallow initiated at level of valleculae with solids x 1. No penetration or aspiration noted. Thin liquids not trials given patient refusal across multiple attempts however caregivers report no concern with liquids. PLAN/RECOMMENDATIONS :  Continue moist and minced diet with purees included     COMMUNICATION/EDUCATION:   The above findings and recommendations were discussed with: Patient caregivers who verbalized understanding.   Report faxed to MD.    Thank you for this referral.  Jyoti Ford, SLP  Time Calculation: 30 mins

## 2023-02-01 NOTE — PATIENT INSTRUCTIONS
Vaccine Information Statement Influenza (Flu) Vaccine (Inactivated or Recombinant): What you need to know Many Vaccine Information Statements are available in Telugu and other languages. See www.immunize.org/vis Hojas de Información Sobre Vacunas están disponibles en Español y en muchos otros idiomas. Visite www.immunize.org/vis 1. Why get vaccinated? Influenza (flu) is a contagious disease that spreads around the [place] every year, usually between October and May. Flu is caused by influenza viruses, and is spread mainly by coughing, sneezing, and close contact. Anyone can get flu. Flu strikes suddenly and can last several days. Symptoms vary by age, but can include: 
 fever/chills  sore throat  muscle aches  fatigue  cough  headache  runny or stuffy nose Flu can also lead to pneumonia and blood infections, and cause diarrhea and seizures in children. If you have a medical condition, such as heart or lung disease, flu can make it worse. Flu is more dangerous for some people. Infants and young children, people 72years of age and older, pregnant women, and people with certain health conditions or a weakened immune system are at greatest risk. Each year thousands of people in the [facility] die from flu, and many more are hospitalized. Flu vaccine can: 
 keep you from getting flu, 
 make flu less severe if you do get it, and 
 keep you from spreading flu to your family and other people. 2. Inactivated and recombinant flu vaccines A dose of flu vaccine is recommended every flu season. Children 6 months through 6years of age may need two doses during the same flu season. Everyone else needs only one dose each flu season.   
 
 
Some inactivated flu vaccines contain a very small amount of a mercury-based preservative called thimerosal. Studies have not shown thimerosal in vaccines to be harmful, but flu vaccines that do not contain thimerosal are available. There is no live flu virus in flu shots. They cannot cause the flu. There are many flu viruses, and they are always changing. Each year a new flu vaccine is made to protect against three or four viruses that are likely to cause disease in the upcoming flu season. But even when the vaccine doesnt exactly match these viruses, it may still provide some protection Flu vaccine cannot prevent: 
 flu that is caused by a virus not covered by the vaccine, or 
 illnesses that look like flu but are not. It takes about 2 weeks for protection to develop after vaccination, and protection lasts through the flu season. 3. Some people should not get this vaccine Tell the person who is giving you the vaccine:  If you have any severe, life-threatening allergies. If you ever had a life-threatening allergic reaction after a dose of flu vaccine, or have a severe allergy to any part of this vaccine, you may be advised not to get vaccinated. Most, but not all, types of flu vaccine contain a small amount of egg protein.  If you ever had Guillain-Barré Syndrome (also called GBS). Some people with a history of GBS should not get this vaccine. This should be discussed with your doctor.  If you are not feeling well. It is usually okay to get flu vaccine when you have a mild illness, but you might be asked to come back when you feel better. 4. Risks of a vaccine reaction With any medicine, including vaccines, there is a chance of reactions. These are usually mild and go away on their own, but serious reactions are also possible. Most people who get a flu shot do not have any problems with it. Minor problems following a flu shot include:  
 soreness, redness, or swelling where the shot was given  hoarseness  sore, red or itchy eyes  cough  fever  aches  headache  itching  fatigue If these problems occur, they usually begin soon after the shot and last 1 or 2 days. More serious problems following a flu shot can include the following:  There may be a small increased risk of Guillain-Barré Syndrome (GBS) after inactivated flu vaccine. This risk has been estimated at 1 or 2 additional cases per million people vaccinated. This is much lower than the risk of severe complications from flu, which can be prevented by flu vaccine.  Young children who get the flu shot along with pneumococcal vaccine (PCV13) and/or DTaP vaccine at the same time might be slightly more likely to have a seizure caused by fever. Ask your doctor for more information. Tell your doctor if a child who is getting flu vaccine has ever had a seizure. Problems that could happen after any injected vaccine:  People sometimes faint after a medical procedure, including vaccination. Sitting or lying down for about 15 minutes can help prevent fainting, and injuries caused by a fall. Tell your doctor if you feel dizzy, or have vision changes or ringing in the ears.  Some people get severe pain in the shoulder and have difficulty moving the arm where a shot was given. This happens very rarely.  Any medication can cause a severe allergic reaction. Such reactions from a vaccine are very rare, estimated at about 1 in a million doses, and would happen within a few minutes to a few hours after the vaccination. As with any medicine, there is a very remote chance of a vaccine causing a serious injury or death. The safety of vaccines is always being monitored. For more information, visit: www.cdc.gov/vaccinesafety/ 
 
5. What if there is a serious reaction? What should I look for?  Look for anything that concerns you, such as signs of a severe allergic reaction, very high fever, or unusual behavior.  
 
Signs of a severe allergic reaction can include hives, swelling of the face and throat, difficulty breathing, a fast heartbeat, dizziness, and weakness  usually within a few minutes to a few hours after the vaccination. What should I do?  If you think it is a severe allergic reaction or other emergency that cant wait, call 9-1-1 and get the person to the nearest hospital. Otherwise, call your doctor.  Reactions should be reported to the Vaccine Adverse Event Reporting System (VAERS). Your doctor should file this report, or you can do it yourself through  the VAERS web site at www.vaers. [facility].gov, or by calling 0-499.715.8903. VAERS does not give medical advice. 6. The National Vaccine Injury Compensation Program 
 
The [facility] Vaccine Injury Compensation Program (VICP) is a federal program that was created to compensate people who may have been injured by certain vaccines. Persons who believe they may have been injured by a vaccine can learn about the program and about filing a claim by calling 3-452.817.3576 or visiting the [employer] website at www.[facility].gov/vaccinecompensation. There is a time limit to file a claim for compensation. 7. How can I learn more?  Ask your healthcare provider. He or she can give you the vaccine package insert or suggest other sources of information.  Call your local or state health department.  Contact the Centers for Disease Control and Prevention (CDC): 
- Call 4-924.863.7741 (1-800-CDC-INFO) or 
- Visit CDCs website at www.cdc.gov/flu Vaccine Information Statement Inactivated Influenza Vaccine 8/7/2015 
[address] 095BI-18 Department of [facility] and [place] Centers for Disease Control and Prevention Office Use Only B LE/weight-bearing as tolerated

## 2023-02-22 RX ORDER — HYDROCHLOROTHIAZIDE 25 MG/1
TABLET ORAL
Qty: 31 TABLET | Refills: 0 | Status: SHIPPED | OUTPATIENT
Start: 2023-02-22

## 2023-02-23 NOTE — PROGRESS NOTES
02/23/2023  Sampson Holt is a 66 y.o., male.         Tobacco Use:  The patient  reports that he has been smoking cigarettes. He has been smoking an average of 1 pack per day. He has quit using smokeless tobacco.     Results for orders placed or performed during the hospital encounter of 02/17/23   EKG 12-lead    Collection Time: 02/17/23  8:19 AM    Narrative    Test Reason : Z01.818,    Vent. Rate : 077 BPM     Atrial Rate : 077 BPM     P-R Int : 192 ms          QRS Dur : 088 ms      QT Int : 386 ms       P-R-T Axes : 018 050 072 degrees     QTc Int : 436 ms    Normal sinus rhythm  Normal ECG  When compared with ECG of 06-JUL-2022 15:05,  No significant change was found  Confirmed by Pipe Matos MD ([id number]) on 2/17/2023 5:32:12 PM    Referred By:             Confirmed By:Pipe aMtos MD             Lab Results   Component Value Date    WBC 8.29 01/30/2023    HGB 16.8 01/30/2023    HCT 49.5 01/30/2023    MCV 95 01/30/2023     01/30/2023     BMP  Lab Results   Component Value Date     01/30/2023    K 3.8 01/30/2023     01/30/2023    CO2 25 01/30/2023    BUN 17 01/30/2023    CREATININE 0.9 01/30/2023    CALCIUM 10.4 01/30/2023    ANIONGAP 11 01/30/2023     (H) 01/30/2023     (H) 07/06/2022     (H) 03/29/2020       No results found for this or any previous visit.        Pre-op Assessment    I have reviewed the Patient Summary Reports.     I have reviewed the Nursing Notes. I have reviewed the NPO Status.   I have reviewed the Medications.     Review of Systems  Anesthesia Hx:  No problems with previous Anesthesia  Denies Family Hx of Anesthesia complications.   Denies Personal Hx of Anesthesia complications.   Social:  Smoker, Alcohol Use  Illicit Drug Use: Types of drugs include Marijuana,   Hematology/Oncology:  Hematology Normal   Oncology Normal  HISTORY OF PRESENT ILLNESS  Don Dumont is a 50 y.o. male. Today's Pt main concerns were provided on virtual visit and Video telemed format,  Present on VV for the concern of the current medical conditions,  pt is w/ comorbid history and  the pt has been exposed to covid-19 individual, and has been diagnose with ++test resultsPt Have been staying at home for couple of days pt has no low grade fever >1 wk with dry cough no dyspnea, no ha, not dizzy, nl smell nl taste, no N/V/D, no body ache. over all stating that things are getting better have a good family support at this time, who brings food for the pt     Much improved except for the cough the appetite much better and having a good nl pulse  Current Outpatient Medications   Medication Sig Dispense Refill    phenol-glycerin (Chloraseptic Max Sore Throat) 1.5-33 % spry 1 Actuation(s) by Mucous Membrane route four (4) times daily as needed for Cough. 1 Bottle 1    benzonatate (Tessalon Perles) 100 mg capsule Tessalon Perles 100 mg capsule   Take 1 capsule 3 times a day by oral route.  doxycycline (VIBRAMYCIN) 100 mg capsule doxycycline hyclate 100 mg capsule   Take 1 capsule twice a day by oral route for 10 days.  ergocalciferol (ERGOCALCIFEROL) 1,250 mcg (50,000 unit) capsule Take 1 Cap by mouth every seven (7) days. 4 Cap 11    vit b comp & c-fa-copper-zinc (FOLBEE PLUS CZ) 5-1.5-25 mg tab Take 1 Tab by mouth daily. 30 Tab 1    docusate sodium (COLACE) 100 mg capsule TAKE (1) CAPSULE BY MOUTH TWICE DAILY 60 Cap PRN    montelukast (SINGULAIR) 10 mg tablet TAKE 1 TABLET BY MOUTH DAILY ** OK TO CRUSH AND GIVE MEDS** 30 Tab PRN    furosemide (LASIX) 20 mg tablet One tab daily 30 Tab 0    magnesium hydroxide (GARIBAY MILK OF MAGNESIA) 400 mg/5 mL suspension Take 30 mL by mouth nightly as needed for Constipation. Give ONLY if patient has  no bowel movement in three days.  354 mL 1    polyethylene glycol (MIRALAX) 17 gram packet Take 1 Packet by mouth     EENT/Dental:  EENT/Dental Normal Throat Disease: Vocal Cord Nodule Patient with history of vocal cord nodule - removed   Cardiovascular:   Hypertension, poorly controlled CHF ECG has been reviewed. History of endocarditis    No cardiologist at this time   Pulmonary:   COPD, mild Asthma Emphysema    Albuterol inhaler use - last use 3 months ago   Renal/:  Renal/ Normal     Hepatic/GI:   Bowel Prep. Patient with no active nausea vomiting at this time   Neurological:   CVA, no residual symptoms    Endocrine:   Diabetes, poorly controlled, type 2, using insulin    Dermatological:  Skin Normal    Psych:  Psychiatric Normal           Physical Exam  General: Well nourished, Cooperative, Alert and Oriented    Airway:  Mallampati: III / II  Mouth Opening: Normal  TM Distance: > 6 cm  Tongue: Normal  Neck ROM: Normal ROM    Dental:  Dentures    Chest/Lungs:  Clear to auscultation, Normal Respiratory Rate    Heart:  Rate: Normal  Rhythm: Regular Rhythm        Anesthesia Plan  Type of Anesthesia, risks & benefits discussed:    Anesthesia Type: MAC  Intra-op Monitoring Plan: Standard ASA Monitors  Induction:  IV  ASA Score: 3  Anesthesia Plan Notes:       MAC with Propofol     POM Mask     Ready For Surgery From Anesthesia Perspective.     .       daily. Hold for loose stool 30 Each 12    lisinopril (PRINIVIL, ZESTRIL) 40 mg tablet Take 1 tab daily, Hold meds if systolic MI<53,WH diastolic BB<89,& if AX<52FXQ,THADDEUS MD if systolic OS>670,RS diastolic BY>18 & if AY>966 bpm 90 Tab 1    cloNIDine HCl (CATAPRES) 0.2 mg tablet TAKE ONE TABLET BY MOUTH AT BEDTIME  OK TO CRUSH AND GIVE MEDS    Hold bp meds if systolic bp <65,  if diastolic bp <64, and if HR <60bpm, call MD if systolic BP > 233,  if diastolic bp >30 and if HR >100 bpm  Indications: Attention Deficit Disorder with Hyperactivity, high blood pressure 90 Tab 2    nebivolol (BYSTOLIC) 10 mg tablet TAKE 1 TABLET BY MOUTH TWICE A DAY  OK TO CRUSH AND GIVE MEDS,  Hold bp meds if systolic bp <00,  if diastolic bp <21, and if HR <60bpm, call MD if systolic BP > 078,  if diastolic bp >57 and if HR >100 bpm 60 Tab 6    potassium chloride SR (KLOR-CON 10) 10 mEq tablet TAKE 1 TABLET BY MOUTH DAILY DO NOT HU MUST SWALLOW WHOLE 28 Tab PRN    cetirizine (ZYRTEC) 10 mg tablet TAKE 1 TABLET BY MOUTH DAILY FOR ALLERGIES ** OK TO CRUSH AND GIVE MEDS** 30 Tab PRN    omeprazole (PRILOSEC) 40 mg capsule TAKE 1 CAPSULE BY MOUTH EACH MORNING 30 MINUTES BEFORE BREAKFAST **OKTO OPEN CAPSULE AND GIVE CONTENTS** 30 Cap PRN    acetaminophen (TYLENOL) 325 mg tablet Take 2 tablets by mouth every 4 hours as needed for needed      fluticasone (FLONASE) 50 mcg/actuation nasal spray BLOW NOSE: 2 SPRAYS IN EACH NOSTRIL DAILY FOR ALLERGY. 16 g 11    OTHER Pardeep's baby shampoo. use as lid scrub to each eye once daily      ciclopirox (PENLAC) 8 % solution APPLY TO AFFECTED AREA TWICE DAILY EXTERNALLY AS DIRECTED. (Patient taking differently: Apply on toenails at bedtime and wipe off with polish removal in the morning) 6.6 mL 11    Miscellaneous Medical Supply (OCUSOFT EYELID CLEANSING PADS) pads by Does Not Apply route.  alcaftadine (LASTACAFT) 0.25 % drop Apply  to eye.       albuterol (PROVENTIL HFA, VENTOLIN HFA, PROAIR HFA) 90 mcg/actuation inhaler Take 1 puff by inhalation every four (4) hours as needed for Wheezing. 1 Inhaler 1     Allergies   Allergen Reactions    Zofran [Ondansetron Hcl] Itching     Arm turned red & itched    No Known Allergies Hives     Past Medical History:   Diagnosis Date    Acute pharyngitis 9/29/2014    Agitation requiring sedation protocol 4/11/2018    Bronchitis, acute 12/12/2013    Cellulitis 7/26/2018    Cellulitis of groin 3/23/2016    Chronic rhinitis 4/11/2018    Contact dermatitis and other eczema, due to unspecified cause     Decrease in appetite 12/12/2013    Diabetes ([facility] [address]     Dysphagia 10/22/2014    Fall at nursing home 7/24/2017    Fever in adult 9/29/2014    Hypertension     Inguinal bulge 4/11/2018    Mental retardation     Prediabetes 10/2/2014    Screening for glaucoma 12/3/2014    Seizure ([facility] [address] 11/27/2017    Stool incontinence 4/11/2018     Past Surgical History:   Procedure Laterality Date    HX HEENT      dental surg. June 7, 2010     Family History   Family history unknown: Yes     Social History     Tobacco Use    Smoking status: Never Smoker    Smokeless tobacco: Never Used   Substance Use Topics    Alcohol use: No      Lab Results   Component Value Date/Time    WBC 5.1 01/27/2020 03:48 PM    HGB 12.9 (L) 01/27/2020 03:48 PM    HCT 39.1 01/27/2020 03:48 PM    PLATELET 511 77/09/0192 03:48 PM    MCV 92 01/27/2020 03:48 PM     Lab Results   Component Value Date/Time    TSH 3.200 01/27/2020 03:48 PM         Review of Systems   Constitutional: Negative for chills and fever. HENT: Negative for nosebleeds. Eyes: Negative for pain. Respiratory: Positive for cough. Negative for wheezing. Cardiovascular: Negative for chest pain and leg swelling. Gastrointestinal: Negative for constipation, diarrhea and nausea. Genitourinary: Negative for frequency. Musculoskeletal: Negative for joint pain and myalgias. Skin: Negative for rash. Neurological: Negative for loss of consciousness and headaches. Endo/Heme/Allergies: Does not bruise/bleed easily. Psychiatric/Behavioral: Negative for depression. The patient is not nervous/anxious and does not have insomnia. All other systems reviewed and are negative. Physical Exam  Constitutional:       Appearance: Normal appearance. HENT:      Head: Normocephalic and atraumatic. Nose: Nose normal. No congestion. Neurological:      Mental Status: He is alert and oriented to person, place, and time. Psychiatric:         Mood and Affect: Mood normal.         Behavior: Behavior normal.         Thought Content: Thought content normal.         Judgment: Judgment normal.         ASSESSMENT and PLAN  Diagnoses and all orders for this visit:    1. COVID-19  -     promethazine-codeine (PHENERGAN with CODEINE) 6.25-10 mg/5 mL syrup; Take 5 mL by mouth three (3) times daily as needed for Cough for up to 5 days. Max Daily Amount: 15 mL. 2. Seizure ([address]  -     promethazine-codeine (PHENERGAN with CODEINE) 6.25-10 mg/5 mL syrup; Take 5 mL by mouth three (3) times daily as needed for Cough for up to 5 days. Max Daily Amount: 15 mL. 3. Platelets decreased ([address]  -     promethazine-codeine (PHENERGAN with CODEINE) 6.25-10 mg/5 mL syrup; Take 5 mL by mouth three (3) times daily as needed for Cough for up to 5 days. Max Daily Amount: 15 mL. 4. Diabetes mellitus type 2, diet-controlled ([address]  -     promethazine-codeine (PHENERGAN with CODEINE) 6.25-10 mg/5 mL syrup; Take 5 mL by mouth three (3) times daily as needed for Cough for up to 5 days. Max Daily Amount: 15 mL. 5. Acute bronchitis due to 2019 novel coronavirus  -     promethazine-codeine (PHENERGAN with CODEINE) 6.25-10 mg/5 mL syrup; Take 5 mL by mouth three (3) times daily as needed for Cough for up to 5 days. Max Daily Amount: 15 mL.     Other orders  -     albuterol-ipratropium (DUO-NEB) 2.5 mg-0.5 mg/3 ml nebu; 3 mL by Nebulization route four (4) times daily as needed for Wheezing. Pt was told that currently,there is no antiviral medication which is recommended to treat COVID-19. Current treatment is aimed to relieve sxs such as pain relievers no ibuprofen but mostly acetaminophen, anti Cough medication , plenty of Rest and a lot of oral hydration. Isolation and Contact tracing at this time   Pursuant to the emergency declaration under the [address]  and [place] [address] waiver authority and the [employer] Act, this Virtual Visit was conducted, with patient's consent, to reduce the patient's risk of exposure to COVID-19 and provide continuity of care for an established patient  Services were provided through a Video synchronous discussion virtually to substitute for in-person appointment. Patient unable to recall medications, but confirms Amlodipine and ASA 81.  Attempted to contact daughter Lyric (who has a screen shot of her meds).   Need to follow up AM for exact medication list.   ?Cymbalta, Cozaar.

## 2023-03-21 ENCOUNTER — OFFICE VISIT (OUTPATIENT)
Dept: FAMILY MEDICINE CLINIC | Age: 48
End: 2023-03-21

## 2023-03-21 VITALS
HEART RATE: 83 BPM | DIASTOLIC BLOOD PRESSURE: 91 MMHG | BODY MASS INDEX: 21.69 KG/M2 | HEIGHT: 66 IN | RESPIRATION RATE: 19 BRPM | OXYGEN SATURATION: 98 % | SYSTOLIC BLOOD PRESSURE: 151 MMHG | TEMPERATURE: 97.9 F

## 2023-03-21 DIAGNOSIS — D69.6 PLATELETS DECREASED (HCC): ICD-10-CM

## 2023-03-21 DIAGNOSIS — Z00.00 MEDICARE ANNUAL WELLNESS VISIT, SUBSEQUENT: Primary | ICD-10-CM

## 2023-03-21 DIAGNOSIS — E55.9 VITAMIN D DEFICIENCY: ICD-10-CM

## 2023-03-21 DIAGNOSIS — Z12.5 SPECIAL SCREENING FOR MALIGNANT NEOPLASM OF PROSTATE: ICD-10-CM

## 2023-03-21 DIAGNOSIS — E11.65 TYPE 2 DIABETES MELLITUS WITH HYPERGLYCEMIA, WITHOUT LONG-TERM CURRENT USE OF INSULIN (HCC): ICD-10-CM

## 2023-03-21 DIAGNOSIS — R53.2 COMPLETE IMMOBILITY DUE TO SEVERE PHYSICAL DISABILITY OR FRAILTY (HCC): ICD-10-CM

## 2023-03-21 DIAGNOSIS — J20.9 ACUTE BRONCHITIS, UNSPECIFIED ORGANISM: ICD-10-CM

## 2023-03-21 DIAGNOSIS — Z12.11 SCREENING FOR COLON CANCER: ICD-10-CM

## 2023-03-21 RX ORDER — DOXYCYCLINE 100 MG/1
100 CAPSULE ORAL 2 TIMES DAILY
Qty: 20 CAPSULE | Refills: 0 | Status: SHIPPED | OUTPATIENT
Start: 2023-03-21 | End: 2023-03-31

## 2023-03-21 NOTE — PROGRESS NOTES
This is the Subsequent Medicare Annual Wellness Exam, performed 12 months or more after the Initial AWV or the last Subsequent AWV    I have reviewed the patient's medical history in detail and updated the computerized patient record. Hemoglobin A1c 4.8 - 5.6 % 6.8 High   5.9 High  CM  8.2 High        Assessment/Plan   Education and counseling provided:  Are appropriate based on today's review and evaluation  End-of-Life planning (with patient's consent)  Hepatitis B Vaccine  Prostate cancer screening tests (PSA, covered annually)  Colorectal cancer screening tests    1. Medicare annual wellness visit, subsequent  2. Screening for colon cancer  -     REFERRAL TO GASTROENTEROLOGY  3. Complete immobility due to severe physical disability or frailty ([facility] [address]  Assessment & Plan:   monitored by specialist. No acute findings meriting change in the plan  Orders:  -     CBC W/O DIFF; Future  -     LIPID PANEL; Future  -     HEMOGLOBIN A1C WITH EAG; Future  -     METABOLIC PANEL, COMPREHENSIVE; Future  -     FERRITIN; Future  -     TSH 3RD GENERATION; Future  4. Type 2 diabetes mellitus with hyperglycemia, without long-term current use of insulin (HCC)  Assessment & Plan:   at goal, continue current medications, continue current treatment plan  Orders:  -     CBC W/O DIFF; Future  -     LIPID PANEL; Future  -     HEMOGLOBIN A1C WITH EAG; Future  -     METABOLIC PANEL, COMPREHENSIVE; Future  -     FERRITIN; Future  -     TSH 3RD GENERATION; Future  5. Platelets decreased ([facility] [address]  Assessment & Plan:   asymptomatic, continue current treatment plan  Orders:  -     CBC W/O DIFF; Future  -     LIPID PANEL; Future  -     HEMOGLOBIN A1C WITH EAG; Future  -     METABOLIC PANEL, COMPREHENSIVE; Future  -     FERRITIN; Future  -     TSH 3RD GENERATION; Future  6.  Special screening for malignant neoplasm of prostate  Comments:  Discussed the potential benefits and harms of the prostate-specific antigen (PSA) serum level test as a screening tool for early prostate cancer detection. Orders:  -     PSA SCREENING (SCREENING); Future  7. Vitamin D deficiency  -     VITAMIN D, 25 HYDROXY; Future  8. Acute bronchitis, unspecified organism  -     doxycycline (VIBRAMYCIN) 100 mg capsule; Take 1 Capsule by mouth two (2) times a day for 10 days. , Normal, Disp-20 Capsule, R-0       Depression Risk Factor Screening     3 most recent PHQ Screens 11/29/2022   PHQ Not Done Functional capacity motivation limits accuracy   Little interest or pleasure in doing things Not at all   Feeling down, depressed, irritable, or hopeless Not at all   Total Score PHQ 2 0       Alcohol & Drug Abuse Risk Screen    Do you average more than 2 drinks per night or 14 drinks a week: No    On any one occasion in the past three months have you have had more than 4 drinks containing alcohol:  No          Functional Ability and Level of Safety    Hearing: Hearing is good. Activities of Daily Living: The home contains: handrails, grab bars, and rugs  Patient needs help with:  phone, transportation, shopping, preparing meals, laundry, housework, managing medications, managing money, eating, dressing, bathing, hygiene, bathroom needs, and walking      Ambulation: wheelchair bound     Fall Risk:  Fall Risk Assessment, last 12 mths 5/23/2022   Able to walk?  No   Fall in past 12 months? -   Do you feel unsteady? -   Are you worried about falling -      Abuse Screen:  Patient is not abused       Cognitive Screening    Has your family/caregiver stated any concerns about your memory: yes - severe cognitive dysfuxn     Cognitive Screening: Abnormal - MMSE (Mini Mental Status Exam)    Health Maintenance Due     Health Maintenance Due   Topic Date Due    Hepatitis C Screening  Never done    Hepatitis B Vaccine (1 of 3 - Risk 3-dose series) Never done    Eye Exam Retinal or Dilated  07/09/2016    Foot Exam Q1  03/23/2017    Diabetic Alb to Cr ratio (uACR) test  07/25/2018    COVID-19 Vaccine (3 - Booster for [employer] series) 05/29/2021       Patient Care Team   Patient Care Team:  Curtis Desai MD as PCP - General (Family Medicine)  Curtis Desai MD as PCP - [facility] Provider  Chioma Garnica RN as Nurse Navigator  Dinesh Adrian MD as Physician (Cardiovascular Disease Physician)    History     Patient Active Problem List   Diagnosis Code    Environmental allergies Z91.09    Hypokalemia E87.6    Elevated BUN R79.9    Dyslipidemia E78.5    Profound mental retardation in adult F73    Torsion, testicular N44.00    HTN, goal below 130/80 I10    PPD screening test Z11.1    Otitis media H66.90    Decrease in appetite R63.0    Fever and chills R50.9    Bronchitis, acute J20.9    Platelets decreased ([address] D69.6    Injury of elbow, right S59.901A    Fever in adult R50.9    Acute pharyngitis J02.9    Prediabetes R73.03    Dysphagia R13.10    Cellulitis of groin L03.314    Fall at nursing home [address] [facility], R46.600    Seizure ([address] R56.9    Inguinal bulge R19.09    Chronic rhinitis J31.0    Agitation requiring sedation protocol R45.1    Stool incontinence R15.9    Functional diarrhea K59.1    Cellulitis L03.90    Complete immobility due to severe physical disability or frailty ([address] R53.2    Type 2 diabetes mellitus with hyperglycemia, without long-term current use of insulin ([address] E11.65     Past Medical History:   Diagnosis Date    Acute pharyngitis 9/29/2014    Agitation requiring sedation protocol 4/11/2018    Bronchitis, acute 12/12/2013    Cellulitis 7/26/2018    Cellulitis of groin 3/23/2016    Chronic rhinitis 4/11/2018    Contact dermatitis and other eczema, due to unspecified cause     Decrease in appetite 12/12/2013    Diabetes ([address]     Dysphagia 10/22/2014    Fall at nursing home 7/24/2017    Fever in adult 9/29/2014    Hypertension     Inguinal bulge 4/11/2018    Mental retardation     Prediabetes 10/2/2014    Screening for glaucoma 12/3/2014    Seizure ([address] 11/27/2017    Stool incontinence 4/11/2018      Past Surgical History:   Procedure Laterality Date    HX HEENT      dental surg. June 7, 2010     Current Outpatient Medications   Medication Sig Dispense Refill    hydroCHLOROthiazide (HYDRODIURIL) 25 mg tablet TAKE 1 TABLET BY MOUTH EVERY DAY FOR BLOOD PRESSURE 31 Tablet 0    rosuvastatin (CRESTOR) 10 mg tablet Take 1 Tablet by mouth nightly. 30 Tablet 5    acetaminophen (Tylenol 8 Hour) 650 mg TbER Take 1 Tablet by mouth every eight (8) hours. 40 Tablet 0    glucose blood VI test strips (Accu-Chek Guide test strips) strip CHECK BLOOD SUGAR once daily as needed for abnormal blood sugar readings 50 Strip 0    metFORMIN (GLUCOPHAGE) 500 mg tablet Take 1 Tablet by mouth two (2) times daily (with meals). 60 Tablet 4    multivitamin (ONE A DAY) tablet Take 1 Tablet by mouth daily. 30 Tablet 5    clindamycin (CLINDAGEL) 1 % topical gel Apply  to affected area daily. (Patient not taking: No sig reported)      Easy Touch Safety Lancets 28 gauge misc USE TO CHECK BLOOD SUGAR FOR DIABETIC MONITORING. 100 Lancet 2    triamcinolone acetonide (KENALOG) 0.1 % topical cream Apply  to affected area two (2) times a day. use thin layer 15 g 0    albuterol-ipratropium (DUO-NEB) 2.5 mg-0.5 mg/3 ml nebu 3 mL by Nebulization route four (4) times daily as needed for Wheezing. 30 Nebule 2    ergocalciferol (ERGOCALCIFEROL) 1,250 mcg (50,000 unit) capsule Take 1 Cap by mouth every seven (7) days.  4 Cap 11    montelukast (SINGULAIR) 10 mg tablet TAKE 1 TABLET BY MOUTH DAILY ** OK TO CRUSH AND GIVE MEDS** 30 Tab PRN    lisinopril (PRINIVIL, ZESTRIL) 40 mg tablet Take 1 tab daily, Hold meds if systolic NQ<92,XA diastolic PJ<90,& if LX<11KFE,ACNJ MD if systolic SD>907,TT diastolic NK>26 & if OA>002 bpm 90 Tab 1    cloNIDine HCl (CATAPRES) 0.2 mg tablet TAKE ONE TABLET BY MOUTH AT BEDTIME  OK TO CRUSH AND GIVE MEDS    Hold bp meds if systolic bp <88,  if diastolic bp <48, and if HR <60bpm, call MD if systolic BP > 482,  if diastolic bp >31 and if HR >100 bpm  Indications: Attention Deficit Disorder with Hyperactivity, high blood pressure 90 Tab 2    nebivolol (BYSTOLIC) 10 mg tablet TAKE 1 TABLET BY MOUTH TWICE A DAY  OK TO CRUSH AND GIVE MEDS,  Hold bp meds if systolic bp <59,  if diastolic bp <78, and if HR <60bpm, call MD if systolic BP > 737,  if diastolic bp >44 and if HR >100 bpm 60 Tab 6    omeprazole (PRILOSEC) 40 mg capsule TAKE 1 CAPSULE BY MOUTH EACH MORNING 30 MINUTES BEFORE BREAKFAST **OKTO OPEN CAPSULE AND GIVE CONTENTS** 30 Cap PRN    acetaminophen (TYLENOL) 325 mg tablet Take 2 tablets by mouth every 4 hours as needed for needed      fluticasone (FLONASE) 50 mcg/actuation nasal spray BLOW NOSE: 2 SPRAYS IN EACH NOSTRIL DAILY FOR ALLERGY. 16 g 11    OTHER Pardeep's baby shampoo. use as lid scrub to each eye once daily (Patient not taking: No sig reported)      Miscellaneous Medical Supply pads by Does Not Apply route. (Patient not taking: No sig reported)      alcaftadine 0.25 % drop Apply  to eye. (Patient not taking: No sig reported)      albuterol (PROVENTIL HFA, VENTOLIN HFA, PROAIR HFA) 90 mcg/actuation inhaler Take 1 puff by inhalation every four (4) hours as needed for Wheezing.  1 Inhaler 1     Allergies   Allergen Reactions    Zofran [Ondansetron Hcl] Itching     Arm turned red & itched    No Known Allergies Hives       Family History   Family history unknown: Yes     Social History     Tobacco Use    Smoking status: Never    Smokeless tobacco: Never   Substance Use Topics    Alcohol use: No         Vance Rodriguez MD

## 2023-03-21 NOTE — PATIENT INSTRUCTIONS
Medicare Wellness Visit, Male    The best way to live healthy is to have a lifestyle where you eat a well-balanced diet, exercise regularly, limit alcohol use, and quit all forms of tobacco/nicotine, if applicable. Regular preventive services are another way to keep healthy. Preventive services (vaccines, screening tests, monitoring & exams) can help personalize your care plan, which helps you manage your own care. Screening tests can find health problems at the earliest stages, when they are easiest to treat. [facility] follows the current, evidence-based guidelines published by the [facility] ([facility]) when recommending preventive services for our patients. Because we follow these guidelines, sometimes recommendations change over time as research supports it. (For example, a prostate screening blood test is no longer routinely recommended for men with no symptoms). Of course, you and your doctor may decide to screen more often for some diseases, based on your risk and co-morbidities (chronic disease you are already diagnosed with). Preventive services for you include:  - Medicare offers their members a free annual wellness visit, which is time for you and your primary care provider to discuss and plan for your preventive service needs.  Take advantage of this benefit every year!    -Over the age of 72 should receive the recommended pneumonia vaccines.   -All adults should have a flu vaccine yearly.  -All adults should receive a tetanus vaccine every 10 years.  -Over the age of 48 should receive the shingles vaccines.    -All adults should be screened once for Hepatitis C.  -All adults age 38-68 who are overweight should have a diabetes screening test once every three years.   -Other screening tests & preventive services for persons with diabetes include: an eye exam to screen for diabetic retinopathy, a kidney function test, a foot exam, and stricter control over your cholesterol.   -Cardiovascular screening for adults with routine risk involves an electrocardiogram (ECG) at intervals determined by the provider.     -Colorectal cancer screening should be done for adults age 43-69 with no increased risk factors for colorectal cancer. There are a number of acceptable methods of screening for this type of cancer. Each test has its own benefits and drawbacks. Discuss with your provider what is most appropriate for you during your annual wellness visit. The different tests include: colonoscopy (considered the best screening method), a fecal occult blood test, a fecal DNA test, and sigmoidoscopy.    -Lung cancer screening is recommended annually with a low dose CT scan for adults between age 54 and 68, who have smoked at least 30 pack years (equivalent of 1 pack per day for 30 days), and who is a current smoker or quit less than 15 years ago. -An Abdominal Aortic Aneurysm (AAA) Screening is recommended for men age 73-68 who has ever smoked in their lifetime.      Here is a list of your current Health Maintenance items (your personalized list of preventive services) with a due date:  Health Maintenance Due   Topic Date Due    Hepatitis C Test  Never done    Hepatitis B Vaccine (1 of 3 - Risk 3-dose series) Never done    Eye Exam  07/09/2016    Diabetic Foot Care  03/23/2017    URINE CHECK FOR KIDNEY PROBLEMS  07/25/2018    COVID-19 Vaccine (3 - Booster for Meyers Peter series) 05/29/2021

## 2023-03-21 NOTE — PROGRESS NOTES
Identified pt with two pt identifiers(name and ). Reviewed record in preparation for visit and have obtained necessary documentation. Chief Complaint   Patient presents with    Follow-up     Right  arm     Cough        Vitals:    23 0930   BP: (!) 151/91   Pulse: 83   Resp: 19   Temp: 97.9 °F (36.6 °C)   TempSrc: Temporal   SpO2: 98%   Height: 5' 6\" (1.676 m)   PainSc:   0 - No pain       Health Maintenance Due   Topic    Hepatitis C Screening     Eye Exam Retinal or Dilated     Foot Exam Q1     Diabetic Alb to Cr ratio (uACR) test        Coordination of Care Questionnaire:  :   1) Have you been to an emergency room, urgent care, or hospitalized since your last visit? If yes, where when, and reason for visit? no       2. Have seen or consulted any other health care provider since your last visit? If yes, where when, and reason for visit? NO      Patient is accompanied by  Counselor I have received verbal consent from Hung Martinez to discuss any/all medical information while they are present in the room.

## 2023-05-22 NOTE — TELEPHONE ENCOUNTER
Last appointment: 3/21/23  Next appointment: Álvaro Goes to follow-up 9/2023  Previous refill encounter(s): 12/1/22 Crestor #30 with 5 refills, 2/22/23 HCTZ #31    Requested Prescriptions     Pending Prescriptions Disp Refills    rosuvastatin (CRESTOR) 10 MG tablet [Pharmacy Med Name: ROSUVASTATIN CALCIUM 10 MG TAB] 90 tablet 3     Sig: TAKE ONE TABLET BY MOUTH EVERY DAY AT BEDTIME FOR CHLESTEROL. hydroCHLOROthiazide (HYDRODIURIL) 25 MG tablet [Pharmacy Med Name: HYDROCHLOROTHIAZIDE 25 MG TAB] 90 tablet 3     Sig: TAKE 1 TABLET BY MOUTH EVERY DAY FOR BLOOD PRESSURE         For Pharmacy Admin Tracking Only    Program: Medication Refill  CPA in place:    Recommendation Provided To:    Intervention Detail: New Rx: 2, reason: Patient Preference  Intervention Accepted By:   Talia Johnson?:    Time Spent (min): 5

## 2023-05-25 RX ORDER — ROSUVASTATIN CALCIUM 10 MG/1
TABLET, COATED ORAL
Qty: 90 TABLET | Refills: 3 | Status: SHIPPED | OUTPATIENT
Start: 2023-05-25

## 2023-05-25 RX ORDER — HYDROCHLOROTHIAZIDE 25 MG/1
TABLET ORAL
Qty: 90 TABLET | Refills: 3 | Status: SHIPPED | OUTPATIENT
Start: 2023-05-25

## 2023-06-23 ENCOUNTER — TELEMEDICINE (OUTPATIENT)
Age: 48
End: 2023-06-23

## 2023-06-23 DIAGNOSIS — R56.9 CONVULSIONS, UNSPECIFIED CONVULSION TYPE (HCC): Primary | ICD-10-CM

## 2023-06-23 PROCEDURE — 99213 OFFICE O/P EST LOW 20 MIN: CPT | Performed by: FAMILY MEDICINE

## 2023-06-23 SDOH — ECONOMIC STABILITY: INCOME INSECURITY: HOW HARD IS IT FOR YOU TO PAY FOR THE VERY BASICS LIKE FOOD, HOUSING, MEDICAL CARE, AND HEATING?: NOT HARD AT ALL

## 2023-06-23 SDOH — ECONOMIC STABILITY: HOUSING INSECURITY
IN THE LAST 12 MONTHS, WAS THERE A TIME WHEN YOU DID NOT HAVE A STEADY PLACE TO SLEEP OR SLEPT IN A SHELTER (INCLUDING NOW)?: NO

## 2023-06-23 SDOH — ECONOMIC STABILITY: FOOD INSECURITY: WITHIN THE PAST 12 MONTHS, YOU WORRIED THAT YOUR FOOD WOULD RUN OUT BEFORE YOU GOT MONEY TO BUY MORE.: NEVER TRUE

## 2023-06-23 SDOH — ECONOMIC STABILITY: FOOD INSECURITY: WITHIN THE PAST 12 MONTHS, THE FOOD YOU BOUGHT JUST DIDN'T LAST AND YOU DIDN'T HAVE MONEY TO GET MORE.: NEVER TRUE

## 2023-06-23 NOTE — PROGRESS NOTES
Chief Complaint   Patient presents with    ED Follow-up     Seen at [facility] on 23 for c/o weakness and unresponsiveness,  dx with seizures       1. Have you been to the ER, urgent care clinic since your last visit? Hospitalized since your last visit? Yes When: 23 Where: [facility]  Reason for visit: weakness    2. Have you seen or consulted any other health care providers outside of the [address] [place] [address] since your last visit? Include any pap smears or colon screening.  No     The patient, Stevenson Zacariass, identity was verified by name and , care giver Radha redding
precaution 24 hours Assistant on a daily basis staff in the assisted living were told to call for any concern     Return in about 4 weeks (around 7/21/2023), or if symptoms worsen or fail to improve. Objective   Patient-Reported Vitals  BP Observations: No, remote/electronic monitoring device was not used or able to be verified  Patient-Reported Weight: 134lbs      Fred Mercado, was evaluated through a synchronous (real-time) audio-video encounter. The patient (or guardian if applicable) is aware that this is a billable service, which includes applicable co-pays. This Virtual Visit was conducted with patient's (and/or legal guardian's) consent. Patient identification was verified, and a caregiver was present when appropriate.    The patient was located at Home: [facility] [id number]  Provider was located at [facility] ([address] [place], [place] [address]t): [id number] [facility]  [address]         --Pierre Parmar MD

## 2023-08-29 ENCOUNTER — OFFICE VISIT (OUTPATIENT)
Age: 48
End: 2023-08-29
Payer: MEDICARE

## 2023-08-29 VITALS
WEIGHT: 130 LBS | TEMPERATURE: 97.9 F | RESPIRATION RATE: 17 BRPM | SYSTOLIC BLOOD PRESSURE: 163 MMHG | HEART RATE: 79 BPM | HEIGHT: 66 IN | BODY MASS INDEX: 20.89 KG/M2 | OXYGEN SATURATION: 98 % | DIASTOLIC BLOOD PRESSURE: 111 MMHG

## 2023-08-29 DIAGNOSIS — R53.2 FUNCTIONAL QUADRIPLEGIA (HCC): ICD-10-CM

## 2023-08-29 DIAGNOSIS — R79.9 ABNORMAL FINDING OF BLOOD CHEMISTRY, UNSPECIFIED: ICD-10-CM

## 2023-08-29 DIAGNOSIS — I10 HTN, GOAL BELOW 130/80: Primary | ICD-10-CM

## 2023-08-29 PROCEDURE — G8420 CALC BMI NORM PARAMETERS: HCPCS | Performed by: FAMILY MEDICINE

## 2023-08-29 PROCEDURE — 99213 OFFICE O/P EST LOW 20 MIN: CPT | Performed by: FAMILY MEDICINE

## 2023-08-29 PROCEDURE — 1036F TOBACCO NON-USER: CPT | Performed by: FAMILY MEDICINE

## 2023-08-29 PROCEDURE — 3017F COLORECTAL CA SCREEN DOC REV: CPT | Performed by: FAMILY MEDICINE

## 2023-08-29 PROCEDURE — 3074F SYST BP LT 130 MM HG: CPT | Performed by: FAMILY MEDICINE

## 2023-08-29 PROCEDURE — G8427 DOCREV CUR MEDS BY ELIG CLIN: HCPCS | Performed by: FAMILY MEDICINE

## 2023-08-29 PROCEDURE — 3078F DIAST BP <80 MM HG: CPT | Performed by: FAMILY MEDICINE

## 2023-08-29 NOTE — TELEPHONE ENCOUNTER
Last appointment: 6/23/23  Next appointment: today    Requested Prescriptions     Pending Prescriptions Disp Refills    Alcohol Swabs (B-D SINGLE USE SWABS REGULAR) PADS [Pharmacy Med Name: ALCOHOL 70% PREP PADS] 100 each 3     Sig: CHECK BLOOD SUGAR once daily as needed for abnormal blood sugar readings         For Pharmacy Admin Tracking Only    Program: Medication Refill  CPA in place:    Recommendation Provided To:    Intervention Detail: New Rx: 1, reason: Patient Preference  Intervention Accepted By:   Mallorie Brito Closed?:    Time Spent (min): 5

## 2023-08-29 NOTE — PROGRESS NOTES
Name and Date of Birth Verified    Patient of Dr. Best Navarro Verified    Chief Complaint   Patient presents with    Annual Exam       1. \"Have you been to the ER, urgent care clinic since your last visit? Hospitalized since your last visit? \" No    2. \"Have you seen or consulted any other health care providers outside of the [address] [place] [address] since your last visit? \" No     3. For patients aged 43-73: Has the patient had a colonoscopy / FIT/ Cologuard? Yes Kit Test Sent Off 8/2023      If the patient is female:    4. For patients aged 43-66: Has the patient had a mammogram within the past 2 years? N/A      5. For patients aged 21-65: Has the patient had a pap smear?  N/A     Health Maintenance Due   Topic Date Due    HIV screen  Never done    Diabetic Alb to Cr ratio (uACR) test  Never done    Hepatitis C screen  Never done    Hepatitis B vaccine (1 of 3 - Risk 3-dose series) Never done    Diabetic retinal exam  07/09/2016    Diabetic foot exam  03/23/2017    Colorectal Cancer Screen  Never done    COVID-19 Vaccine (3 - Booster for Pfizer series) 05/29/2021    Shingles vaccine (1 of 2) Never done    A1C test (Diabetic or Prediabetic)  05/30/2023    Flu vaccine (1) 08/01/2023    DTaP/Tdap/Td vaccine (2 - Td or Tdap) 08/12/2023

## 2023-08-30 LAB
ALBUMIN SERPL-MCNC: 3.7 G/DL (ref 3.5–5)
ALBUMIN/GLOB SERPL: 0.9 (ref 1.1–2.2)
ALP SERPL-CCNC: 57 U/L (ref 45–117)
ALT SERPL-CCNC: 20 U/L (ref 12–78)
ANION GAP SERPL CALC-SCNC: 10 MMOL/L (ref 5–15)
AST SERPL-CCNC: 15 U/L (ref 15–37)
BILIRUB SERPL-MCNC: 0.1 MG/DL (ref 0.2–1)
BUN SERPL-MCNC: 26 MG/DL (ref 6–20)
BUN/CREAT SERPL: 22 (ref 12–20)
CALCIUM SERPL-MCNC: 9.5 MG/DL (ref 8.5–10.1)
CHLORIDE SERPL-SCNC: 103 MMOL/L (ref 97–108)
CO2 SERPL-SCNC: 27 MMOL/L (ref 21–32)
CREAT SERPL-MCNC: 1.16 MG/DL (ref 0.7–1.3)
ERYTHROCYTE [DISTWIDTH] IN BLOOD BY AUTOMATED COUNT: 13.8 % (ref 11.5–14.5)
EST. AVERAGE GLUCOSE BLD GHB EST-MCNC: 151 MG/DL
GLOBULIN SER CALC-MCNC: 4.1 G/DL (ref 2–4)
GLUCOSE SERPL-MCNC: 129 MG/DL (ref 65–100)
HBA1C MFR BLD: 6.9 % (ref 4–5.6)
HCT VFR BLD AUTO: 33.7 % (ref 36.6–50.3)
HGB BLD-MCNC: 10.6 G/DL (ref 12.1–17)
MCH RBC QN AUTO: 31.1 PG (ref 26–34)
MCHC RBC AUTO-ENTMCNC: 31.5 G/DL (ref 30–36.5)
MCV RBC AUTO: 98.8 FL (ref 80–99)
NRBC # BLD: 0 K/UL (ref 0–0.01)
NRBC BLD-RTO: 0 PER 100 WBC
PLATELET # BLD AUTO: 153 K/UL (ref 150–400)
PMV BLD AUTO: 10 FL (ref 8.9–12.9)
POTASSIUM SERPL-SCNC: 3.8 MMOL/L (ref 3.5–5.1)
PROT SERPL-MCNC: 7.8 G/DL (ref 6.4–8.2)
RBC # BLD AUTO: 3.41 M/UL (ref 4.1–5.7)
SODIUM SERPL-SCNC: 140 MMOL/L (ref 136–145)
TSH SERPL DL<=0.05 MIU/L-ACNC: 2.14 UIU/ML (ref 0.36–3.74)
WBC # BLD AUTO: 5.8 K/UL (ref 4.1–11.1)

## 2023-08-30 RX ORDER — ISOPROPYL ALCOHOL 0.75 G/1
SWAB TOPICAL
Qty: 100 EACH | Refills: 3 | Status: SHIPPED | OUTPATIENT
Start: 2023-08-30

## 2023-08-31 NOTE — PROGRESS NOTES
Stella Bennett (:  1975) is a 46 y.o. male,Established patient, here for evaluation of the following chief complaint(s): Annual Exam  Patient present for annual wellness visit fortunately his annual wellness visit was done in 2023 presented [facility] living nurse care provider has few pages of form to be completed otherwise patient has been stable with history of uncontrolled blood pressure unfortunately patient is not allowing to get an accurate blood pressure reading on him he is not allowing to any person to touch him he becomes jittery and irritated he has been compliant with medication as per the the provider patient has no complaint and concern and is doing well in an assisted living    Review of system unable to obtain benign as per the staff,       General:  alert cooperative appears stated for the age  HEENT; normocephalic and atraumatic PERRLA extraocular motor intact normal tympanic membrane normal external ear bilaterally, mucosal normal no drainage  Neck: supple no adenopathy no JVD no bruit  Lungs: Clear to auscultation bilaterally no rales rhonchi or wheezes  Heart: Normal regular S1-S2 ,  no murmur  Breast exam deferred  Abdomen: Soft nontender normal bowel sounds   Extremities: Normal range of motion no point tenderness normal pulses no edema  Pelvic/: No lesion, no lymphadenopathy  Skin: Normal no lesion no rash         ASSESSMENT/PLAN:  1. HTN, goal below 130/80  -     TSH; Future  -     Hemoglobin A1C; Future  -     Comprehensive Metabolic Panel; Future  -     CBC; Future  2. Functional quadriplegia (HCC)  -     TSH; Future  -     Hemoglobin A1C; Future  -     Comprehensive Metabolic Panel; Future  -     CBC; Future  3.  Abnormal finding of blood chemistry, unspecified  -     Hemoglobin A1C; Future    Lab result reviewed stable anemia state stable A1c at 6.9 percentile elevated BUN advised to increase oral hydration  Return in about 6 months (around 2024), or if symptoms

## 2023-09-12 NOTE — PROGRESS NOTES
Chief Complaint   Patient presents with    Follow-up     2/3/22 ER visit        1. Have you been to the ER, urgent care clinic since your last visit? Hospitalized since your last visit? Yes When: ER visit to [facility] for boil on leg. Given levaquin and clindamycin, pt is now having abdominal pain    2. Have you seen or consulted any other health care providers outside of the [address] [place] [address] since your last visit? Include any pap smears or colon screening.  No Patient reports pain has decreased to 6/10. Drinking IV contrast. Call light within reach. Urinal at bedside. Patient aware of need for urine sample.

## 2023-12-12 RX ORDER — POLYETHYLENE GLYCOL 3350 17 G/17G
17 POWDER ORAL DAILY PRN
COMMUNITY
Start: 2023-10-19 | End: 2023-12-12 | Stop reason: SDUPTHER

## 2023-12-12 NOTE — TELEPHONE ENCOUNTER
Patient nurse manager Mariano Narvaez would like to give patient miralex prn he is having a lot of loose stools please give her a call @ [phone]

## 2023-12-13 RX ORDER — POLYETHYLENE GLYCOL 3350 17 G/17G
17 POWDER ORAL DAILY PRN
Qty: 116 G | Refills: 1 | Status: SHIPPED | OUTPATIENT
Start: 2023-12-13

## 2024-01-24 ENCOUNTER — TELEPHONE (OUTPATIENT)
Age: 49
End: 2024-01-24

## 2024-01-24 NOTE — TELEPHONE ENCOUNTER
Sushila with Home care [facility] is requesting a legible copy of the medical necessity form she can be reached @ [phone]

## 2024-10-01 ENCOUNTER — TELEPHONE (OUTPATIENT)
Age: 49
End: 2024-10-01

## 2024-10-01 ENCOUNTER — TELEMEDICINE (OUTPATIENT)
Age: 49
End: 2024-10-01
Payer: MEDICARE

## 2024-10-01 DIAGNOSIS — R56.9 CONVULSIONS, UNSPECIFIED CONVULSION TYPE (HCC): ICD-10-CM

## 2024-10-01 DIAGNOSIS — R53.2 FUNCTIONAL QUADRIPLEGIA (HCC): ICD-10-CM

## 2024-10-01 DIAGNOSIS — D69.6 THROMBOCYTOPENIA, UNSPECIFIED (HCC): ICD-10-CM

## 2024-10-01 DIAGNOSIS — E11.65 TYPE 2 DIABETES MELLITUS WITH HYPERGLYCEMIA, UNSPECIFIED WHETHER LONG TERM INSULIN USE (HCC): ICD-10-CM

## 2024-10-01 DIAGNOSIS — N30.00 ACUTE CYSTITIS WITHOUT HEMATURIA: Primary | ICD-10-CM

## 2024-10-01 PROCEDURE — G8427 DOCREV CUR MEDS BY ELIG CLIN: HCPCS | Performed by: FAMILY MEDICINE

## 2024-10-01 PROCEDURE — 2022F DILAT RTA XM EVC RTNOPTHY: CPT | Performed by: FAMILY MEDICINE

## 2024-10-01 PROCEDURE — 3046F HEMOGLOBIN A1C LEVEL >9.0%: CPT | Performed by: FAMILY MEDICINE

## 2024-10-01 PROCEDURE — 99213 OFFICE O/P EST LOW 20 MIN: CPT | Performed by: FAMILY MEDICINE

## 2024-10-01 PROCEDURE — 3017F COLORECTAL CA SCREEN DOC REV: CPT | Performed by: FAMILY MEDICINE

## 2024-10-01 RX ORDER — NITROFURANTOIN MACROCRYSTAL 100 MG
100 CAPSULE ORAL 2 TIMES DAILY
Qty: 14 CAPSULE | Refills: 0 | Status: SHIPPED | OUTPATIENT
Start: 2024-10-01 | End: 2024-10-08

## 2024-10-01 SDOH — ECONOMIC STABILITY: INCOME INSECURITY: HOW HARD IS IT FOR YOU TO PAY FOR THE VERY BASICS LIKE FOOD, HOUSING, MEDICAL CARE, AND HEATING?: NOT HARD AT ALL

## 2024-10-01 SDOH — ECONOMIC STABILITY: FOOD INSECURITY: WITHIN THE PAST 12 MONTHS, THE FOOD YOU BOUGHT JUST DIDN'T LAST AND YOU DIDN'T HAVE MONEY TO GET MORE.: NEVER TRUE

## 2024-10-01 SDOH — ECONOMIC STABILITY: FOOD INSECURITY: WITHIN THE PAST 12 MONTHS, YOU WORRIED THAT YOUR FOOD WOULD RUN OUT BEFORE YOU GOT MONEY TO BUY MORE.: NEVER TRUE

## 2024-10-01 ASSESSMENT — PATIENT HEALTH QUESTIONNAIRE - PHQ9
SUM OF ALL RESPONSES TO PHQ QUESTIONS 1-9: 0
SUM OF ALL RESPONSES TO PHQ QUESTIONS 1-9: 0
SUM OF ALL RESPONSES TO PHQ9 QUESTIONS 1 & 2: 0
1. LITTLE INTEREST OR PLEASURE IN DOING THINGS: NOT AT ALL
2. FEELING DOWN, DEPRESSED OR HOPELESS: NOT AT ALL
SUM OF ALL RESPONSES TO PHQ QUESTIONS 1-9: 0
SUM OF ALL RESPONSES TO PHQ QUESTIONS 1-9: 0

## 2024-10-01 NOTE — TELEPHONE ENCOUNTER
[facility] would like a call back about Macrodantin prescription [phone] speak with Jennifer or anyone on the yellow team.

## 2024-10-01 NOTE — PROGRESS NOTES
Franck Black, was evaluated through a synchronous (real-time) audio-video encounter. The patient (or guardian if applicable) is aware that this is a billable service, which includes applicable co-pays. This Virtual Visit was conducted with patient's (and/or legal guardian's) consent. Patient identification was verified, and a caregiver was present when appropriate.   The patient was located at Facility ([facility] Department): [address] [place] [id number]  Provider was located at Home (Appt Dept State): [place]  Confirm you are appropriately licensed, registered, or certified to deliver care in the state where the patient is located as indicated above. If you are not or unsure, please re-schedule the visit: Yes, I confirm.     Franck Black (:  1975) is a Established patient, presenting virtually for evaluation of the following: Patient resident of a assisted living the case was presented by the nurse provider with a chief complaint of urinary Frequency  stating that this is a new but recurrent problem. The current episode started more than 1 week ago. The problem occurs every urination. The problem has been gradually worsening. The quality of the pain is described as burning. The pain is at a severity of 2/10. There has been no fever. is not sexually active. There is no history of pyelonephritis. Associated symptoms include frequency, hesitancy and urgency with foul odor. Pertinent negatives include no chills, no sweats, no nausea, no vomiting, no discharge, no flank pain, , no abdominal pain and no back pain. has not tried acetaminophen and NSAIDs for the symptoms. The treatment provided no relief. past medical history does not include kidney stones, single kidney, urological procedure, recurrent UTIs or urinary stasis.         Below is the assessment and plan developed based on review of pertinent history, physical exam, labs, studies, and medications.     Assessment & Plan  Acute cystitis

## 2024-10-01 NOTE — TELEPHONE ENCOUNTER
Jamison patient nurse manager would like to know if  can send in an antibiotic for UTI she can be reached @ [phone]

## 2024-10-01 NOTE — TELEPHONE ENCOUNTER
Returned call to [facility] pharmacy.  Requesting to change macrodantin to macrobid,  approved per Dr Caldwell

## 2024-10-01 NOTE — PROGRESS NOTES
Chief Complaint   Patient presents with    urine odor       \"Have you been to the ER, urgent care clinic since your last visit?  Hospitalized since your last visit?\"    NO    “Have you seen or consulted any other health care providers outside of [facility] since your last visit?”    NO        “Have you had a colorectal cancer screening such as a colonoscopy/FIT/Cologuard?    NO    No colonoscopy on file  No cologuard on file  No FIT/FOBT on file   No flexible sigmoidoscopy on file         The patient, Franck Black, identity was verified by name and .

## 2024-10-30 ENCOUNTER — OFFICE VISIT (OUTPATIENT)
Age: 49
End: 2024-10-30

## 2024-10-30 VITALS
HEART RATE: 73 BPM | RESPIRATION RATE: 16 BRPM | OXYGEN SATURATION: 100 % | BODY MASS INDEX: 20.98 KG/M2 | TEMPERATURE: 97.5 F | HEIGHT: 66 IN | DIASTOLIC BLOOD PRESSURE: 120 MMHG | SYSTOLIC BLOOD PRESSURE: 187 MMHG

## 2024-10-30 DIAGNOSIS — E11.65 TYPE 2 DIABETES MELLITUS WITH HYPERGLYCEMIA, WITHOUT LONG-TERM CURRENT USE OF INSULIN (HCC): ICD-10-CM

## 2024-10-30 DIAGNOSIS — R73.03 PREDIABETES: ICD-10-CM

## 2024-10-30 DIAGNOSIS — Z12.5 ENCOUNTER FOR SCREENING FOR MALIGNANT NEOPLASM OF PROSTATE: ICD-10-CM

## 2024-10-30 DIAGNOSIS — E87.6 HYPOKALEMIA: ICD-10-CM

## 2024-10-30 DIAGNOSIS — Z11.1 SCREENING-PULMONARY TB: ICD-10-CM

## 2024-10-30 DIAGNOSIS — Z00.00 MEDICARE ANNUAL WELLNESS VISIT, SUBSEQUENT: Primary | ICD-10-CM

## 2024-10-30 DIAGNOSIS — E78.5 DYSLIPIDEMIA: ICD-10-CM

## 2024-10-30 DIAGNOSIS — Z12.11 SCREENING FOR MALIGNANT NEOPLASM OF COLON: ICD-10-CM

## 2024-10-30 DIAGNOSIS — E55.9 VITAMIN D DEFICIENCY, UNSPECIFIED: ICD-10-CM

## 2024-10-30 DIAGNOSIS — Z71.89 ADVICE GIVEN ABOUT COVID-19 VIRUS INFECTION: ICD-10-CM

## 2024-10-30 LAB
25(OH)D3 SERPL-MCNC: 51.4 NG/ML (ref 30–100)
ALBUMIN SERPL-MCNC: 3.5 G/DL (ref 3.5–5)
ALBUMIN/GLOB SERPL: 0.9 (ref 1.1–2.2)
ALP SERPL-CCNC: 50 U/L (ref 45–117)
ALT SERPL-CCNC: 21 U/L (ref 12–78)
ANION GAP SERPL CALC-SCNC: 7 MMOL/L (ref 2–12)
AST SERPL-CCNC: <3 U/L (ref 15–37)
BILIRUB SERPL-MCNC: 0.3 MG/DL (ref 0.2–1)
BUN SERPL-MCNC: 21 MG/DL (ref 6–20)
BUN/CREAT SERPL: 19 (ref 12–20)
CALCIUM SERPL-MCNC: 9.8 MG/DL (ref 8.5–10.1)
CHLORIDE SERPL-SCNC: 105 MMOL/L (ref 97–108)
CHOLEST SERPL-MCNC: 101 MG/DL
CO2 SERPL-SCNC: 32 MMOL/L (ref 21–32)
CREAT SERPL-MCNC: 1.12 MG/DL (ref 0.7–1.3)
ERYTHROCYTE [DISTWIDTH] IN BLOOD BY AUTOMATED COUNT: 13.9 % (ref 11.5–14.5)
EST. AVERAGE GLUCOSE BLD GHB EST-MCNC: 140 MG/DL
FERRITIN SERPL-MCNC: 95 NG/ML (ref 26–388)
FOLATE SERPL-MCNC: >100 NG/ML (ref 5–21)
GLOBULIN SER CALC-MCNC: 4.1 G/DL (ref 2–4)
GLUCOSE SERPL-MCNC: 112 MG/DL (ref 65–100)
HBA1C MFR BLD: 6.5 % (ref 4–5.6)
HCT VFR BLD AUTO: 33.7 % (ref 36.6–50.3)
HDLC SERPL-MCNC: 46 MG/DL
HDLC SERPL: 2.2 (ref 0–5)
HGB BLD-MCNC: 10.9 G/DL (ref 12.1–17)
LDLC SERPL CALC-MCNC: 33.2 MG/DL (ref 0–100)
MCH RBC QN AUTO: 31 PG (ref 26–34)
MCHC RBC AUTO-ENTMCNC: 32.3 G/DL (ref 30–36.5)
MCV RBC AUTO: 95.7 FL (ref 80–99)
NRBC # BLD: 0 K/UL (ref 0–0.01)
NRBC BLD-RTO: 0 PER 100 WBC
PLATELET # BLD AUTO: 166 K/UL (ref 150–400)
PMV BLD AUTO: 9.1 FL (ref 8.9–12.9)
POTASSIUM SERPL-SCNC: 3.9 MMOL/L (ref 3.5–5.1)
PROT SERPL-MCNC: 7.6 G/DL (ref 6.4–8.2)
PSA SERPL-MCNC: 1 NG/ML (ref 0.01–4)
RBC # BLD AUTO: 3.52 M/UL (ref 4.1–5.7)
SODIUM SERPL-SCNC: 144 MMOL/L (ref 136–145)
TRIGL SERPL-MCNC: 109 MG/DL
TSH SERPL DL<=0.05 MIU/L-ACNC: 1.91 UIU/ML (ref 0.36–3.74)
VIT B12 SERPL-MCNC: >2000 PG/ML (ref 193–986)
VLDLC SERPL CALC-MCNC: 21.8 MG/DL
WBC # BLD AUTO: 5.2 K/UL (ref 4.1–11.1)

## 2024-10-30 ASSESSMENT — LIFESTYLE VARIABLES
HOW MANY STANDARD DRINKS CONTAINING ALCOHOL DO YOU HAVE ON A TYPICAL DAY: PATIENT DOES NOT DRINK
HOW OFTEN DO YOU HAVE A DRINK CONTAINING ALCOHOL: NEVER

## 2024-10-30 ASSESSMENT — PATIENT HEALTH QUESTIONNAIRE - PHQ9
SUM OF ALL RESPONSES TO PHQ QUESTIONS 1-9: 0
2. FEELING DOWN, DEPRESSED OR HOPELESS: NOT AT ALL
SUM OF ALL RESPONSES TO PHQ9 QUESTIONS 1 & 2: 0
SUM OF ALL RESPONSES TO PHQ QUESTIONS 1-9: 0
1. LITTLE INTEREST OR PLEASURE IN DOING THINGS: NOT AT ALL

## 2024-10-30 NOTE — PROGRESS NOTES
Medicare Annual Wellness Visit    Franck Black is here for Medicare AWV and Hypertension    Assessment & Plan   Medicare annual wellness visit, subsequent  Recommendations for Preventive Services Due: see orders and patient instructions/AVS.  Recommended screening schedule for the next 5-10 years is provided to the patient in written form: see Patient Instructions/AVS.     Return in 6 months (on 4/30/2025).     Subjective     Present with severe intellectual disability total wheelchair dependency lives in an assisted living with 3 other resident no sign of any clot noted, present for annual wellness visit unable to do colonoscopy so far due to lack of compliancy blood pressure unobtainable due to patient not allowing to get his blood pressure check as per the nurse states patient blood pressure has been obtained at his residence and being always nice and normal, trace hide  Patient's complete Health Risk Assessment and screening values have been reviewed and are found in Flowsheets. The following problems were reviewed today and where indicated follow up appointments were made and/or referrals ordered.    Positive Risk Factor Screenings with Interventions:    Fall Risk:  Do you feel unsteady or are you worried about falling? : (!) yes (patient in wheelchair)  2 or more falls in past year?: no  Fall with injury in past year?: no     Interventions:    Reviewed medications, home hazards, visual acuity, and co-morbidities that can increase risk for falls             Inactivity:  On average, how many days per week do you engage in moderate to strenuous exercise (like a brisk walk)?: 0 days (!) Abnormal  On average, how many minutes do you engage in exercise at this level?: 0 min  Interventions:  Patient advised to follow up in the office for further evaluation and treatment      Dentist Screen:  Have you seen the dentist within the past year?: (!) No    Intervention:  Advised to schedule with their dentist     Vision

## 2024-10-30 NOTE — PROGRESS NOTES
Chief Complaint   Patient presents with    Medicare AWV    Hypertension       \"Have you been to the ER, urgent care clinic since your last visit?  Hospitalized since your last visit?\"    NO    “Have you seen or consulted any other health care providers outside of [facility] since your last visit?”    NO        “Have you had a colorectal cancer screening such as a colonoscopy/FIT/Cologuard?    NO    No colonoscopy on file  No cologuard on file  No FIT/FOBT on file   No flexible sigmoidoscopy on file         Click Here for Release of Records Request     No results found for this visit on 10/30/24.   Vitals:    10/30/24 0931 10/30/24 0939   BP: (!) 161/105 (!) 187/120   Site: Left Upper Arm Right Upper Arm   Position: Sitting Sitting   Cuff Size: Large Adult Large Adult   Pulse: 73    Resp: 16    Temp: 97.5 °F (36.4 °C)    TempSrc: Infrared    SpO2: 100%    Height: 1.676 m (5' 6\")       Health Maintenance Due   Topic Date Due    Diabetic Alb to Cr ratio (uACR) test  Never done    Diabetic retinal exam  2016    Diabetic foot exam  2017    Colorectal Cancer Screen  Never done    Lipids  2023    A1C test (Diabetic or Prediabetic)  2024    Annual Wellness Visit (Medicare)  2024    GFR test (Diabetes, CKD 3-4, OR last GFR 15-59)  2024        The patient, Franck Black, identity was verified by name and . Patient brought in today by Jamison Loyd-

## 2024-11-05 LAB
M TB IFN-G BLD-IMP: NEGATIVE
M TB IFN-G CD4+ T-CELLS BLD-ACNC: 0.03 IU/ML
M TBIFN-G CD4+ CD8+T-CELLS BLD-ACNC: 0.05 IU/ML
QUANTIFERON CRITERIA: NORMAL
QUANTIFERON MITOGEN VALUE: >10 IU/ML
QUANTIFERON NIL VALUE: 0.04 IU/ML

## 2024-11-26 LAB — NONINV COLON CA DNA+OCC BLD SCRN STL QL: NEGATIVE

## 2025-04-18 DIAGNOSIS — E11.65 TYPE 2 DIABETES MELLITUS WITH HYPERGLYCEMIA, WITHOUT LONG-TERM CURRENT USE OF INSULIN (HCC): Primary | ICD-10-CM

## 2025-04-18 DIAGNOSIS — E11.65 TYPE 2 DIABETES MELLITUS WITH HYPERGLYCEMIA, UNSPECIFIED WHETHER LONG TERM INSULIN USE (HCC): ICD-10-CM

## 2025-04-21 RX ORDER — BLOOD SUGAR DIAGNOSTIC
STRIP MISCELLANEOUS
Qty: 100 STRIP | Refills: 3 | Status: SHIPPED | OUTPATIENT
Start: 2025-04-21

## 2025-04-21 NOTE — TELEPHONE ENCOUNTER
Last appointment: 10/30/24  Next appointment: Advised to follow-up 4/30/25  Previous refill encounter(s): 11/29/22    Requested Prescriptions     Pending Prescriptions Disp Refills    blood glucose test strips (ACCU-CHEK GUIDE) strip [Pharmacy Med Name: ACCU-CHEK GUIDE TEST STRIP] 100 strip 3     Sig: CHECK BLOOD SUGAR once daily as needed for abnormal blood sugar readings. Dx E11.65         For Pharmacy Admin Tracking Only    Program: Medication Refill  CPA in place:    Recommendation Provided To:   Intervention Detail: New Rx: 1, reason: Patient Preference  Intervention Accepted By:   Gap Closed?:    Time Spent (min): 5

## 2025-04-23 NOTE — TELEPHONE ENCOUNTER
Last appointment: 10/30/24  Next appointment: Advised to follow-up 4/30/25  Previous refill encounter(s): 4/8/19    Requested Prescriptions     Pending Prescriptions Disp Refills    lisinopril (PRINIVIL;ZESTRIL) 40 MG tablet [Pharmacy Med Name: LISINOPRIL 40 MG TABLET] 90 tablet 1     Sig: TAKE 1 TABLET BY MOUTH DAILY. *HOLD MED IF SBP <90, DBP < 60 AND IF HR <60. CALL MD IF SBP > 160 OR DBP >90 AND IF HR >100.         For Pharmacy Admin Tracking Only    Program: Medication Refill  CPA in place:    Recommendation Provided To:   Intervention Detail: New Rx: 1, reason: Patient Preference  Intervention Accepted By:   Gap Closed?:    Time Spent (min): 5

## 2025-04-25 RX ORDER — LISINOPRIL 40 MG/1
TABLET ORAL
Qty: 90 TABLET | Refills: 1 | Status: SHIPPED | OUTPATIENT
Start: 2025-04-25

## 2025-05-30 NOTE — TELEPHONE ENCOUNTER
Last appointment: 10/30/24  Next appointment: 10/15/25    Requested Prescriptions     Pending Prescriptions Disp Refills    fluticasone (FLONASE) 50 MCG/ACT nasal spray [Pharmacy Med Name: FLUTICASONE PROP 50 MCG SPRAY] 16 g 5     Sig: BLOW NOSE AND SPRAY 2 SPRAYS INTO EACH NOSTRIL DAILY FOR ALLERGIES.         For Pharmacy Admin Tracking Only    Program: Medication Refill  CPA in place:    Recommendation Provided To:   Intervention Detail: New Rx: 1, reason: Patient Preference  Intervention Accepted By:   Gap Closed?:    Time Spent (min): 5

## 2025-06-02 RX ORDER — FLUTICASONE PROPIONATE 50 MCG
SPRAY, SUSPENSION (ML) NASAL
Qty: 16 G | Refills: 5 | Status: SHIPPED | OUTPATIENT
Start: 2025-06-02

## 2025-06-18 RX ORDER — LANCETS 28 GAUGE
EACH MISCELLANEOUS
Qty: 100 EACH | Refills: 3 | Status: SHIPPED | OUTPATIENT
Start: 2025-06-18

## 2025-06-18 NOTE — TELEPHONE ENCOUNTER
Last appointment: 10/30/24  Next appointment: 10/15/25    Requested Prescriptions     Pending Prescriptions Disp Refills    Easy Touch Lancets 28G MISC [Pharmacy Med Name: EASY TOUCH SAFETY 28G LANCETS] 100 each 3     Sig: CHECK BLOOD SUGAR once daily as needed for abnormal blood sugar readings. Dx E11.65         For Pharmacy Admin Tracking Only    Program: Medication Refill  CPA in place:    Recommendation Provided To:   Intervention Detail: New Rx: 1, reason: Patient Preference  Intervention Accepted By:   Gap Closed?:    Time Spent (min): 5  
English

## 2025-07-31 RX ORDER — CYANOCOBALAMIN/FOLIC AC/VIT B6 1-2.5-25MG
1 TABLET ORAL DAILY
COMMUNITY

## 2025-07-31 RX ORDER — CETIRIZINE HYDROCHLORIDE 10 MG/1
10 TABLET ORAL DAILY
COMMUNITY

## 2025-08-07 ENCOUNTER — CLINICAL DOCUMENTATION (OUTPATIENT)
Age: 50
End: 2025-08-07